# Patient Record
Sex: FEMALE | Race: BLACK OR AFRICAN AMERICAN | Employment: OTHER
[De-identification: names, ages, dates, MRNs, and addresses within clinical notes are randomized per-mention and may not be internally consistent; named-entity substitution may affect disease eponyms.]

---

## 2017-01-01 ENCOUNTER — HOME CARE VISIT (OUTPATIENT)
Dept: SCHEDULING | Facility: HOME HEALTH | Age: 82
End: 2017-01-01
Payer: MEDICARE

## 2017-01-01 ENCOUNTER — APPOINTMENT (OUTPATIENT)
Dept: GENERAL RADIOLOGY | Age: 82
DRG: 064 | End: 2017-01-01
Attending: PHYSICIAN ASSISTANT
Payer: MEDICARE

## 2017-01-01 ENCOUNTER — APPOINTMENT (OUTPATIENT)
Dept: CT IMAGING | Age: 82
DRG: 064 | End: 2017-01-01
Attending: PSYCHIATRY & NEUROLOGY
Payer: MEDICARE

## 2017-01-01 ENCOUNTER — HOSPICE ADMISSION (OUTPATIENT)
Dept: HOSPICE | Facility: HOSPICE | Age: 82
End: 2017-01-01
Payer: MEDICARE

## 2017-01-01 ENCOUNTER — APPOINTMENT (OUTPATIENT)
Dept: GENERAL RADIOLOGY | Age: 82
DRG: 064 | End: 2017-01-01
Attending: EMERGENCY MEDICINE
Payer: MEDICARE

## 2017-01-01 ENCOUNTER — OFFICE VISIT (OUTPATIENT)
Dept: FAMILY MEDICINE CLINIC | Age: 82
End: 2017-01-01

## 2017-01-01 ENCOUNTER — APPOINTMENT (OUTPATIENT)
Dept: GENERAL RADIOLOGY | Age: 82
DRG: 064 | End: 2017-01-01
Attending: HOSPITALIST
Payer: MEDICARE

## 2017-01-01 ENCOUNTER — HOME CARE VISIT (OUTPATIENT)
Dept: HOSPICE | Facility: HOSPICE | Age: 82
End: 2017-01-01
Payer: MEDICARE

## 2017-01-01 ENCOUNTER — APPOINTMENT (OUTPATIENT)
Dept: CT IMAGING | Age: 82
DRG: 064 | End: 2017-01-01
Attending: EMERGENCY MEDICINE
Payer: MEDICARE

## 2017-01-01 ENCOUNTER — HOSPITAL ENCOUNTER (OUTPATIENT)
Dept: LAB | Age: 82
Discharge: HOME | End: 2017-05-03

## 2017-01-01 ENCOUNTER — HOSPICE ADMISSION (OUTPATIENT)
Dept: HOSPICE | Facility: HOSPICE | Age: 82
End: 2017-01-01

## 2017-01-01 ENCOUNTER — HOSPITAL ENCOUNTER (INPATIENT)
Age: 82
LOS: 12 days | Discharge: HOSPICE | DRG: 064 | End: 2017-05-18
Attending: EMERGENCY MEDICINE | Admitting: HOSPITALIST
Payer: MEDICARE

## 2017-01-01 ENCOUNTER — HOSPITAL ENCOUNTER (OUTPATIENT)
Dept: LAB | Age: 82
Discharge: HOME | End: 2017-02-16
Payer: MEDICARE

## 2017-01-01 VITALS
OXYGEN SATURATION: 99 % | RESPIRATION RATE: 16 BRPM | SYSTOLIC BLOOD PRESSURE: 80 MMHG | HEART RATE: 128 BPM | DIASTOLIC BLOOD PRESSURE: 62 MMHG

## 2017-01-01 VITALS
TEMPERATURE: 97.7 F | BODY MASS INDEX: 15.5 KG/M2 | SYSTOLIC BLOOD PRESSURE: 164 MMHG | RESPIRATION RATE: 16 BRPM | WEIGHT: 90.8 LBS | HEART RATE: 76 BPM | OXYGEN SATURATION: 97 % | DIASTOLIC BLOOD PRESSURE: 96 MMHG | HEIGHT: 64 IN

## 2017-01-01 VITALS
WEIGHT: 92.4 LBS | BODY MASS INDEX: 15.77 KG/M2 | HEIGHT: 64 IN | HEART RATE: 70 BPM | TEMPERATURE: 97.3 F | RESPIRATION RATE: 18 BRPM | DIASTOLIC BLOOD PRESSURE: 76 MMHG | OXYGEN SATURATION: 98 % | SYSTOLIC BLOOD PRESSURE: 172 MMHG

## 2017-01-01 VITALS
SYSTOLIC BLOOD PRESSURE: 80 MMHG | RESPIRATION RATE: 20 BRPM | TEMPERATURE: 99 F | DIASTOLIC BLOOD PRESSURE: 69 MMHG | HEART RATE: 108 BPM | OXYGEN SATURATION: 93 %

## 2017-01-01 VITALS
BODY MASS INDEX: 15.21 KG/M2 | TEMPERATURE: 98 F | RESPIRATION RATE: 19 BRPM | OXYGEN SATURATION: 97 % | HEIGHT: 64 IN | HEART RATE: 98 BPM | DIASTOLIC BLOOD PRESSURE: 71 MMHG | SYSTOLIC BLOOD PRESSURE: 107 MMHG | WEIGHT: 89.07 LBS

## 2017-01-01 VITALS
TEMPERATURE: 96 F | RESPIRATION RATE: 14 BRPM | WEIGHT: 94 LBS | SYSTOLIC BLOOD PRESSURE: 178 MMHG | HEIGHT: 64 IN | BODY MASS INDEX: 16.05 KG/M2 | DIASTOLIC BLOOD PRESSURE: 94 MMHG | OXYGEN SATURATION: 100 % | HEART RATE: 61 BPM

## 2017-01-01 DIAGNOSIS — M88.9 PAGET'S BONE DISEASE: ICD-10-CM

## 2017-01-01 DIAGNOSIS — I16.1 HYPERTENSIVE EMERGENCY: ICD-10-CM

## 2017-01-01 DIAGNOSIS — Z71.89 ACP (ADVANCE CARE PLANNING): ICD-10-CM

## 2017-01-01 DIAGNOSIS — E03.1 CONGENITAL HYPOTHYROIDISM WITHOUT GOITER: ICD-10-CM

## 2017-01-01 DIAGNOSIS — Z93.3 COLOSTOMY IN PLACE (HCC): Primary | ICD-10-CM

## 2017-01-01 DIAGNOSIS — D50.0 IRON DEFICIENCY ANEMIA DUE TO CHRONIC BLOOD LOSS: ICD-10-CM

## 2017-01-01 DIAGNOSIS — Z85.048 HISTORY OF RECTAL CANCER: ICD-10-CM

## 2017-01-01 DIAGNOSIS — E55.9 VITAMIN D INSUFFICIENCY: ICD-10-CM

## 2017-01-01 DIAGNOSIS — E02 SUBCLINICAL IODINE-DEFICIENCY HYPOTHYROIDISM: ICD-10-CM

## 2017-01-01 DIAGNOSIS — F02.80 DEMENTIA ASSOCIATED WITH OTHER UNDERLYING DISEASE WITHOUT BEHAVIORAL DISTURBANCE (HCC): ICD-10-CM

## 2017-01-01 DIAGNOSIS — I10 ESSENTIAL HYPERTENSION: ICD-10-CM

## 2017-01-01 DIAGNOSIS — R41.82 ALTERED MENTAL STATUS, UNSPECIFIED ALTERED MENTAL STATUS TYPE: ICD-10-CM

## 2017-01-01 DIAGNOSIS — N18.30 CKD (CHRONIC KIDNEY DISEASE) STAGE 3, GFR 30-59 ML/MIN (HCC): Chronic | ICD-10-CM

## 2017-01-01 DIAGNOSIS — S72.001A HIP FRACTURE, RIGHT, CLOSED, INITIAL ENCOUNTER (HCC): ICD-10-CM

## 2017-01-01 DIAGNOSIS — I10 ESSENTIAL HYPERTENSION: Primary | ICD-10-CM

## 2017-01-01 DIAGNOSIS — Z93.3 COLOSTOMY IN PLACE (HCC): ICD-10-CM

## 2017-01-01 DIAGNOSIS — I62.9 SPONTANEOUS INTRAPARENCHYMAL INTRACRANIAL HEMORRHAGE, ACUTE (HCC): Primary | ICD-10-CM

## 2017-01-01 DIAGNOSIS — Z00.00 ROUTINE GENERAL MEDICAL EXAMINATION AT A HEALTH CARE FACILITY: Primary | ICD-10-CM

## 2017-01-01 DIAGNOSIS — R11.2 NAUSEA AND VOMITING, INTRACTABILITY OF VOMITING NOT SPECIFIED, UNSPECIFIED VOMITING TYPE: ICD-10-CM

## 2017-01-01 LAB
ABO + RH BLD: NORMAL
ALBUMIN SERPL BCP-MCNC: 2.7 G/DL (ref 3.4–5)
ALBUMIN SERPL BCP-MCNC: 3.4 G/DL (ref 3.4–5)
ALBUMIN SERPL BCP-MCNC: 3.9 G/DL (ref 3.4–5)
ALBUMIN SERPL BCP-MCNC: 4 G/DL (ref 3.4–5)
ALBUMIN SERPL-MCNC: 4.3 G/DL (ref 3.5–4.7)
ALBUMIN/GLOB SERPL: 0.7 {RATIO} (ref 0.8–1.7)
ALBUMIN/GLOB SERPL: 1 {RATIO} (ref 0.8–1.7)
ALBUMIN/GLOB SERPL: 1 {RATIO} (ref 0.8–1.7)
ALBUMIN/GLOB SERPL: 1.1 {RATIO} (ref 0.8–1.7)
ALBUMIN/GLOB SERPL: 1.5 {RATIO} (ref 1.2–2.2)
ALP SERPL-CCNC: 55 IU/L (ref 39–117)
ALP SERPL-CCNC: 61 U/L (ref 45–117)
ALP SERPL-CCNC: 64 U/L (ref 45–117)
ALP SERPL-CCNC: 68 U/L (ref 45–117)
ALP SERPL-CCNC: 70 U/L (ref 45–117)
ALT SERPL-CCNC: 12 IU/L (ref 0–32)
ALT SERPL-CCNC: 22 U/L (ref 13–56)
ALT SERPL-CCNC: 26 U/L (ref 13–56)
ALT SERPL-CCNC: 27 U/L (ref 13–56)
ALT SERPL-CCNC: 32 U/L (ref 13–56)
AMMONIA PLAS-SCNC: 19 UMOL/L (ref 11–32)
AMMONIA PLAS-SCNC: 24 UMOL/L (ref 11–32)
AMMONIA PLAS-SCNC: 32 UMOL/L (ref 11–32)
AMYLASE SERPL-CCNC: 217 U/L (ref 25–115)
AMYLASE SERPL-CCNC: 315 U/L (ref 25–115)
AMYLASE SERPL-CCNC: 342 U/L (ref 25–115)
ANION GAP BLD CALC-SCNC: 10 MMOL/L (ref 3–18)
ANION GAP BLD CALC-SCNC: 10 MMOL/L (ref 3–18)
ANION GAP BLD CALC-SCNC: 11 MMOL/L (ref 3–18)
ANION GAP BLD CALC-SCNC: 12 MMOL/L (ref 3–18)
ANION GAP BLD CALC-SCNC: 12 MMOL/L (ref 3–18)
ANION GAP BLD CALC-SCNC: 14 MMOL/L (ref 3–18)
ANION GAP BLD CALC-SCNC: 6 MMOL/L (ref 3–18)
ANION GAP BLD CALC-SCNC: 8 MMOL/L (ref 3–18)
ANION GAP BLD CALC-SCNC: 9 MMOL/L (ref 3–18)
ANION GAP BLD CALC-SCNC: 9 MMOL/L (ref 3–18)
ANTIGENS PRESENT BLD: NORMAL
ANTIGENS PRESENT RBC DONR: NORMAL
APPEARANCE UR: ABNORMAL
APPEARANCE UR: ABNORMAL
APPEARANCE UR: CLEAR
APTT PPP: 27.5 SEC (ref 23–36.4)
APTT PPP: 30.3 SEC (ref 23–36.4)
APTT PPP: 31.3 SEC (ref 23–36.4)
APTT PPP: 31.5 SEC (ref 23–36.4)
APTT PPP: 32.8 SEC (ref 23–36.4)
APTT PPP: 33.3 SEC (ref 23–36.4)
APTT PPP: 33.9 SEC (ref 23–36.4)
APTT PPP: 34.2 SEC (ref 23–36.4)
APTT PPP: 36 SEC (ref 23–36.4)
ARTERIAL PATENCY WRIST A: YES
AST SERPL W P-5'-P-CCNC: 18 U/L (ref 15–37)
AST SERPL W P-5'-P-CCNC: 19 U/L (ref 15–37)
AST SERPL W P-5'-P-CCNC: 35 U/L (ref 15–37)
AST SERPL W P-5'-P-CCNC: 38 U/L (ref 15–37)
AST SERPL-CCNC: 16 IU/L (ref 0–40)
ATRIAL RATE: 61 BPM
BACTERIA #/AREA URNS HPF: ABNORMAL /[HPF]
BACTERIA SPEC CULT: ABNORMAL
BACTERIA SPEC CULT: NORMAL
BACTERIA UR CULT: NORMAL
BACTERIA URNS QL MICRO: ABNORMAL /HPF
BACTERIA URNS QL MICRO: NEGATIVE /HPF
BASE DEFICIT BLD-SCNC: 1 MMOL/L
BASE EXCESS BLD CALC-SCNC: 1 MMOL/L
BASE EXCESS BLD CALC-SCNC: 10 MMOL/L
BASE EXCESS BLD CALC-SCNC: 2 MMOL/L
BASE EXCESS BLDV CALC-SCNC: 2 MMOL/L
BASOPHILS # BLD AUTO: 0 K/UL (ref 0–0.06)
BASOPHILS # BLD AUTO: 0 K/UL (ref 0–0.1)
BASOPHILS # BLD AUTO: 0 X10E3/UL (ref 0–0.2)
BASOPHILS # BLD: 0 % (ref 0–2)
BASOPHILS # BLD: 0 % (ref 0–3)
BASOPHILS NFR BLD AUTO: 0 %
BDY SITE: ABNORMAL
BILIRUB DIRECT SERPL-MCNC: 0.1 MG/DL (ref 0–0.2)
BILIRUB DIRECT SERPL-MCNC: 0.2 MG/DL (ref 0–0.2)
BILIRUB SERPL-MCNC: 0.4 MG/DL (ref 0.2–1)
BILIRUB SERPL-MCNC: 0.5 MG/DL (ref 0.2–1)
BILIRUB SERPL-MCNC: 0.5 MG/DL (ref 0–1.2)
BILIRUB SERPL-MCNC: 0.6 MG/DL (ref 0.2–1)
BILIRUB SERPL-MCNC: 0.9 MG/DL (ref 0.2–1)
BILIRUB UR QL STRIP: NEGATIVE
BILIRUB UR QL: NEGATIVE
BILIRUB UR QL: NEGATIVE
BLD PROD TYP BPU: NORMAL
BLOOD BANK CMNT PATIENT-IMP: NORMAL
BLOOD GROUP ANTIBODIES SERPL: NORMAL
BODY TEMPERATURE: 97.7
BODY TEMPERATURE: 98.7
BPU ID: NORMAL
BUN SERPL-MCNC: 11 MG/DL (ref 7–18)
BUN SERPL-MCNC: 11 MG/DL (ref 7–18)
BUN SERPL-MCNC: 12 MG/DL (ref 7–18)
BUN SERPL-MCNC: 12 MG/DL (ref 8–27)
BUN SERPL-MCNC: 13 MG/DL (ref 7–18)
BUN SERPL-MCNC: 14 MG/DL (ref 7–18)
BUN SERPL-MCNC: 16 MG/DL (ref 7–18)
BUN SERPL-MCNC: 17 MG/DL (ref 7–18)
BUN SERPL-MCNC: 18 MG/DL (ref 7–18)
BUN SERPL-MCNC: 18 MG/DL (ref 7–18)
BUN SERPL-MCNC: 19 MG/DL (ref 7–18)
BUN/CREAT SERPL: 10 (ref 12–20)
BUN/CREAT SERPL: 11 (ref 12–20)
BUN/CREAT SERPL: 11 (ref 12–20)
BUN/CREAT SERPL: 11 (ref 12–28)
BUN/CREAT SERPL: 12 (ref 12–20)
BUN/CREAT SERPL: 13 (ref 12–20)
BUN/CREAT SERPL: 14 (ref 12–20)
BUN/CREAT SERPL: 14 (ref 12–20)
CA-I SERPL-SCNC: 0.92 MMOL/L (ref 1.12–1.32)
CA-I SERPL-SCNC: 1.17 MMOL/L (ref 1.12–1.32)
CA-I SERPL-SCNC: 1.19 MMOL/L (ref 1.12–1.32)
CA-I SERPL-SCNC: 1.19 MMOL/L (ref 1.12–1.32)
CA-I SERPL-SCNC: 1.23 MMOL/L (ref 1.12–1.32)
CA-I SERPL-SCNC: 1.25 MMOL/L (ref 1.12–1.32)
CA-I SERPL-SCNC: 1.26 MMOL/L (ref 1.12–1.32)
CA-I SERPL-SCNC: 1.3 MMOL/L (ref 1.12–1.32)
CA-I SERPL-SCNC: 1.34 MMOL/L (ref 1.12–1.32)
CALCIUM SERPL-MCNC: 10.1 MG/DL (ref 8.7–10.3)
CALCIUM SERPL-MCNC: 9.3 MG/DL (ref 8.5–10.1)
CALCIUM SERPL-MCNC: 9.4 MG/DL (ref 8.5–10.1)
CALCIUM SERPL-MCNC: 9.5 MG/DL (ref 8.5–10.1)
CALCIUM SERPL-MCNC: 9.7 MG/DL (ref 8.5–10.1)
CALCIUM SERPL-MCNC: 9.7 MG/DL (ref 8.5–10.1)
CALCIUM SERPL-MCNC: 9.9 MG/DL (ref 8.5–10.1)
CALCIUM SERPL-MCNC: 9.9 MG/DL (ref 8.5–10.1)
CALCULATED P AXIS, ECG09: 63 DEGREES
CALCULATED R AXIS, ECG10: 14 DEGREES
CALCULATED T AXIS, ECG11: -3 DEGREES
CASTS URNS QL MICRO: ABNORMAL /LPF
CHLORIDE SERPL-SCNC: 102 MMOL/L (ref 96–106)
CHLORIDE SERPL-SCNC: 104 MMOL/L (ref 100–108)
CHLORIDE SERPL-SCNC: 107 MMOL/L (ref 100–108)
CHLORIDE SERPL-SCNC: 109 MMOL/L (ref 100–108)
CHLORIDE SERPL-SCNC: 116 MMOL/L (ref 100–108)
CHLORIDE SERPL-SCNC: 117 MMOL/L (ref 100–108)
CHLORIDE SERPL-SCNC: 117 MMOL/L (ref 100–108)
CHLORIDE SERPL-SCNC: 118 MMOL/L (ref 100–108)
CHLORIDE SERPL-SCNC: 122 MMOL/L (ref 100–108)
CHLORIDE SERPL-SCNC: 124 MMOL/L (ref 100–108)
CHLORIDE SERPL-SCNC: 125 MMOL/L (ref 100–108)
CHOLEST SERPL-MCNC: 187 MG/DL
CHOLEST SERPL-MCNC: 200 MG/DL (ref 100–199)
CHOLEST SERPL-MCNC: 203 MG/DL
CK MB CFR SERPL CALC: 0.6 % (ref 0–4)
CK MB CFR SERPL CALC: 1.2 % (ref 0–4)
CK MB CFR SERPL CALC: 1.6 % (ref 0–4)
CK MB SERPL-MCNC: 1.6 NG/ML (ref 5–25)
CK MB SERPL-MCNC: 2.9 NG/ML (ref 5–25)
CK MB SERPL-MCNC: 3.7 NG/ML (ref 5–25)
CK SERPL-CCNC: 101 U/L (ref 26–192)
CK SERPL-CCNC: 320 U/L (ref 26–192)
CK SERPL-CCNC: 489 U/L (ref 26–192)
CO2 SERPL-SCNC: 22 MMOL/L (ref 21–32)
CO2 SERPL-SCNC: 23 MMOL/L (ref 21–32)
CO2 SERPL-SCNC: 24 MMOL/L (ref 21–32)
CO2 SERPL-SCNC: 24 MMOL/L (ref 21–32)
CO2 SERPL-SCNC: 25 MMOL/L (ref 21–32)
CO2 SERPL-SCNC: 28 MMOL/L (ref 18–29)
CO2 SERPL-SCNC: 30 MMOL/L (ref 21–32)
CO2 SERPL-SCNC: 31 MMOL/L (ref 21–32)
COLOR UR: YELLOW
CORTIS SERPL-MCNC: 30.4 UG/DL (ref 3.09–22.4)
CREAT SERPL-MCNC: 1.03 MG/DL (ref 0.6–1.3)
CREAT SERPL-MCNC: 1.03 MG/DL (ref 0.6–1.3)
CREAT SERPL-MCNC: 1.11 MG/DL (ref 0.57–1)
CREAT SERPL-MCNC: 1.15 MG/DL (ref 0.6–1.3)
CREAT SERPL-MCNC: 1.19 MG/DL (ref 0.6–1.3)
CREAT SERPL-MCNC: 1.22 MG/DL (ref 0.6–1.3)
CREAT SERPL-MCNC: 1.22 MG/DL (ref 0.6–1.3)
CREAT SERPL-MCNC: 1.28 MG/DL (ref 0.6–1.3)
CREAT SERPL-MCNC: 1.4 MG/DL (ref 0.6–1.3)
CREAT SERPL-MCNC: 1.43 MG/DL (ref 0.6–1.3)
CREAT SERPL-MCNC: 1.48 MG/DL (ref 0.6–1.3)
CROSSMATCH RESULT,%XM: NORMAL
CRP SERPL HS-MCNC: 0.45 MG/L (ref 0–3)
DIAGNOSIS, 93000: NORMAL
DIFFERENTIAL METHOD BLD: ABNORMAL
EOSINOPHIL # BLD AUTO: 0.1 X10E3/UL (ref 0–0.4)
EOSINOPHIL # BLD: 0 K/UL (ref 0–0.4)
EOSINOPHIL # BLD: 0.1 K/UL (ref 0–0.4)
EOSINOPHIL # BLD: 0.1 K/UL (ref 0–0.4)
EOSINOPHIL NFR BLD AUTO: 1 %
EOSINOPHIL NFR BLD: 0 % (ref 0–5)
EOSINOPHIL NFR BLD: 1 % (ref 0–5)
EOSINOPHIL NFR BLD: 2 % (ref 0–5)
EPI CELLS #/AREA URNS HPF: ABNORMAL /HPF
EPITH CASTS URNS QL MICRO: ABNORMAL /LPF (ref 0–5)
EPITH CASTS URNS QL MICRO: NORMAL /LPF (ref 0–5)
ERYTHROCYTE [DISTWIDTH] IN BLOOD BY AUTOMATED COUNT: 14.2 % (ref 11.6–14.5)
ERYTHROCYTE [DISTWIDTH] IN BLOOD BY AUTOMATED COUNT: 14.3 % (ref 11.6–14.5)
ERYTHROCYTE [DISTWIDTH] IN BLOOD BY AUTOMATED COUNT: 14.6 % (ref 11.6–14.5)
ERYTHROCYTE [DISTWIDTH] IN BLOOD BY AUTOMATED COUNT: 14.8 % (ref 11.6–14.5)
ERYTHROCYTE [DISTWIDTH] IN BLOOD BY AUTOMATED COUNT: 14.8 % (ref 11.6–14.5)
ERYTHROCYTE [DISTWIDTH] IN BLOOD BY AUTOMATED COUNT: 14.9 % (ref 11.6–14.5)
ERYTHROCYTE [DISTWIDTH] IN BLOOD BY AUTOMATED COUNT: 15.3 % (ref 11.6–14.5)
ERYTHROCYTE [DISTWIDTH] IN BLOOD BY AUTOMATED COUNT: 15.3 % (ref 12.3–15.4)
ERYTHROCYTE [SEDIMENTATION RATE] IN BLOOD BY WESTERGREN METHOD: 25 MM/HR (ref 0–40)
ERYTHROCYTE [SEDIMENTATION RATE] IN BLOOD: 34 MM/HR (ref 0–30)
EST. AVERAGE GLUCOSE BLD GHB EST-MCNC: 128 MG/DL
FERRITIN SERPL-MCNC: 116 NG/ML (ref 8–388)
FERRITIN SERPL-MCNC: 140 NG/ML (ref 15–150)
FOLATE SERPL-MCNC: >20 NG/ML
GAS FLOW.O2 O2 DELIVERY SYS: ABNORMAL L/MIN
GAS FLOW.O2 SETTING OXYMISER: 12 BPM
GLOBULIN SER CALC-MCNC: 2.9 G/DL (ref 1.5–4.5)
GLOBULIN SER CALC-MCNC: 3.4 G/DL (ref 2–4)
GLOBULIN SER CALC-MCNC: 3.5 G/DL (ref 2–4)
GLOBULIN SER CALC-MCNC: 3.8 G/DL (ref 2–4)
GLOBULIN SER CALC-MCNC: 4.1 G/DL (ref 2–4)
GLUCOSE BLD STRIP.AUTO-MCNC: 121 MG/DL (ref 70–110)
GLUCOSE BLD STRIP.AUTO-MCNC: 136 MG/DL (ref 70–110)
GLUCOSE BLD STRIP.AUTO-MCNC: 143 MG/DL (ref 70–110)
GLUCOSE BLD STRIP.AUTO-MCNC: 145 MG/DL (ref 70–110)
GLUCOSE BLD STRIP.AUTO-MCNC: 145 MG/DL (ref 70–110)
GLUCOSE BLD STRIP.AUTO-MCNC: 150 MG/DL (ref 70–110)
GLUCOSE BLD STRIP.AUTO-MCNC: 150 MG/DL (ref 70–110)
GLUCOSE BLD STRIP.AUTO-MCNC: 153 MG/DL (ref 70–110)
GLUCOSE BLD STRIP.AUTO-MCNC: 154 MG/DL (ref 70–110)
GLUCOSE BLD STRIP.AUTO-MCNC: 158 MG/DL (ref 70–110)
GLUCOSE BLD STRIP.AUTO-MCNC: 160 MG/DL (ref 70–110)
GLUCOSE BLD STRIP.AUTO-MCNC: 165 MG/DL (ref 70–110)
GLUCOSE BLD STRIP.AUTO-MCNC: 168 MG/DL (ref 70–110)
GLUCOSE BLD STRIP.AUTO-MCNC: 169 MG/DL (ref 70–110)
GLUCOSE BLD STRIP.AUTO-MCNC: 169 MG/DL (ref 70–110)
GLUCOSE BLD STRIP.AUTO-MCNC: 174 MG/DL (ref 70–110)
GLUCOSE BLD STRIP.AUTO-MCNC: 177 MG/DL (ref 70–110)
GLUCOSE BLD STRIP.AUTO-MCNC: 180 MG/DL (ref 70–110)
GLUCOSE BLD STRIP.AUTO-MCNC: 185 MG/DL (ref 70–110)
GLUCOSE BLD STRIP.AUTO-MCNC: 193 MG/DL (ref 70–110)
GLUCOSE BLD STRIP.AUTO-MCNC: 198 MG/DL (ref 70–110)
GLUCOSE BLD STRIP.AUTO-MCNC: 200 MG/DL (ref 70–110)
GLUCOSE BLD STRIP.AUTO-MCNC: 225 MG/DL (ref 70–110)
GLUCOSE BLD STRIP.AUTO-MCNC: 228 MG/DL (ref 70–110)
GLUCOSE BLD STRIP.AUTO-MCNC: 237 MG/DL (ref 70–110)
GLUCOSE BLD STRIP.AUTO-MCNC: 241 MG/DL (ref 70–110)
GLUCOSE BLD STRIP.AUTO-MCNC: 255 MG/DL (ref 70–110)
GLUCOSE BLD STRIP.AUTO-MCNC: 259 MG/DL (ref 70–110)
GLUCOSE BLD STRIP.AUTO-MCNC: 269 MG/DL (ref 70–110)
GLUCOSE BLD STRIP.AUTO-MCNC: 299 MG/DL (ref 70–110)
GLUCOSE BLD STRIP.AUTO-MCNC: 345 MG/DL (ref 70–110)
GLUCOSE BLD STRIP.AUTO-MCNC: 93 MG/DL (ref 70–110)
GLUCOSE SERPL-MCNC: 114 MG/DL (ref 74–99)
GLUCOSE SERPL-MCNC: 127 MG/DL (ref 74–99)
GLUCOSE SERPL-MCNC: 142 MG/DL (ref 74–99)
GLUCOSE SERPL-MCNC: 144 MG/DL (ref 74–99)
GLUCOSE SERPL-MCNC: 147 MG/DL (ref 74–99)
GLUCOSE SERPL-MCNC: 152 MG/DL (ref 74–99)
GLUCOSE SERPL-MCNC: 181 MG/DL (ref 74–99)
GLUCOSE SERPL-MCNC: 220 MG/DL (ref 74–99)
GLUCOSE SERPL-MCNC: 83 MG/DL (ref 74–99)
GLUCOSE SERPL-MCNC: 84 MG/DL (ref 65–99)
GLUCOSE SERPL-MCNC: 89 MG/DL (ref 74–99)
GLUCOSE UR QL: NEGATIVE
GLUCOSE UR STRIP.AUTO-MCNC: 250 MG/DL
GLUCOSE UR STRIP.AUTO-MCNC: NEGATIVE MG/DL
GRAM STN SPEC: ABNORMAL
HBA1C MFR BLD: 6.1 % (ref 4.2–5.6)
HCO3 BLD-SCNC: 22.4 MMOL/L (ref 22–26)
HCO3 BLD-SCNC: 24.5 MMOL/L (ref 22–26)
HCO3 BLD-SCNC: 25.5 MMOL/L (ref 22–26)
HCO3 BLD-SCNC: 33.7 MMOL/L (ref 22–26)
HCO3 BLDV-SCNC: 25.7 MMOL/L (ref 23–28)
HCT VFR BLD AUTO: 30 % (ref 35–45)
HCT VFR BLD AUTO: 30.4 % (ref 35–45)
HCT VFR BLD AUTO: 31.2 % (ref 35–45)
HCT VFR BLD AUTO: 31.2 % (ref 35–45)
HCT VFR BLD AUTO: 31.5 % (ref 35–45)
HCT VFR BLD AUTO: 32.7 % (ref 35–45)
HCT VFR BLD AUTO: 33.1 % (ref 35–45)
HCT VFR BLD AUTO: 35.6 % (ref 35–45)
HCT VFR BLD AUTO: 36.6 % (ref 35–45)
HCT VFR BLD AUTO: 36.8 % (ref 34–46.6)
HDLC SERPL-MCNC: 75 MG/DL
HDLC SERPL-MCNC: 75 MG/DL (ref 40–60)
HDLC SERPL-MCNC: 79 MG/DL (ref 40–60)
HDLC SERPL: 2.4 {RATIO} (ref 0–5)
HDLC SERPL: 2.7 {RATIO} (ref 0–5)
HEMOCCULT STL QL: NEGATIVE
HEMOCCULT STL QL: NEGATIVE
HGB BLD-MCNC: 10.2 G/DL (ref 12–16)
HGB BLD-MCNC: 10.3 G/DL (ref 12–16)
HGB BLD-MCNC: 10.4 G/DL (ref 12–16)
HGB BLD-MCNC: 10.4 G/DL (ref 12–16)
HGB BLD-MCNC: 10.5 G/DL (ref 12–16)
HGB BLD-MCNC: 10.9 G/DL (ref 12–16)
HGB BLD-MCNC: 11.2 G/DL (ref 12–16)
HGB BLD-MCNC: 11.9 G/DL (ref 12–16)
HGB BLD-MCNC: 12.1 G/DL (ref 11.1–15.9)
HGB BLD-MCNC: 12.4 G/DL (ref 12–16)
HGB UR QL STRIP: ABNORMAL
HGB UR QL STRIP: NEGATIVE
HGB UR QL STRIP: NEGATIVE
IMM GRANULOCYTES # BLD: 0 X10E3/UL (ref 0–0.1)
IMM GRANULOCYTES NFR BLD: 0 %
INR BLD: 1.3 (ref 0.9–1.2)
INR PPP: 1.1 (ref 0.8–1.2)
INR PPP: 1.2 (ref 0.8–1.2)
INR PPP: 1.3 (ref 0.8–1.2)
INR PPP: 1.4 (ref 0.8–1.2)
INSPIRATION.DURATION SETTING TIME VENT: 0.9 SEC
INTERPRETATION, 910389: NORMAL
INTERPRETATION: NORMAL
KETONES UR QL STRIP.AUTO: NEGATIVE MG/DL
KETONES UR QL STRIP.AUTO: NEGATIVE MG/DL
KETONES UR QL STRIP: NEGATIVE
LACTATE SERPL-SCNC: 1.5 MMOL/L (ref 0.4–2)
LACTATE SERPL-SCNC: 1.6 MMOL/L (ref 0.4–2)
LACTATE SERPL-SCNC: 1.9 MMOL/L (ref 0.4–2)
LACTATE SERPL-SCNC: 1.9 MMOL/L (ref 0.4–2)
LACTATE SERPL-SCNC: 2 MMOL/L (ref 0.4–2)
LACTATE SERPL-SCNC: 2.3 MMOL/L (ref 0.4–2)
LDLC SERPL CALC-MCNC: 108 MG/DL (ref 0–99)
LDLC SERPL CALC-MCNC: 74.4 MG/DL (ref 0–100)
LDLC SERPL CALC-MCNC: 99.4 MG/DL (ref 0–100)
LEUKOCYTE ESTERASE UR QL STRIP.AUTO: ABNORMAL
LEUKOCYTE ESTERASE UR QL STRIP.AUTO: ABNORMAL
LEUKOCYTE ESTERASE UR QL STRIP: ABNORMAL
LIPASE SERPL-CCNC: 202 U/L (ref 73–393)
LIPASE SERPL-CCNC: 387 U/L (ref 73–393)
LIPASE SERPL-CCNC: 668 U/L (ref 73–393)
LIPID PROFILE,FLP: ABNORMAL
LIPID PROFILE,FLP: ABNORMAL
LYMPHOCYTES # BLD AUTO: 1.1 X10E3/UL (ref 0.7–3.1)
LYMPHOCYTES # BLD AUTO: 15 % (ref 21–52)
LYMPHOCYTES # BLD AUTO: 3 % (ref 21–52)
LYMPHOCYTES # BLD AUTO: 31 % (ref 21–52)
LYMPHOCYTES # BLD AUTO: 4 % (ref 21–52)
LYMPHOCYTES # BLD AUTO: 5 % (ref 20–51)
LYMPHOCYTES # BLD AUTO: 5 % (ref 21–52)
LYMPHOCYTES # BLD AUTO: 5 % (ref 21–52)
LYMPHOCYTES # BLD AUTO: 6 % (ref 21–52)
LYMPHOCYTES # BLD AUTO: 8 % (ref 21–52)
LYMPHOCYTES # BLD: 0.6 K/UL (ref 0.9–3.6)
LYMPHOCYTES # BLD: 0.6 K/UL (ref 0.9–3.6)
LYMPHOCYTES # BLD: 0.7 K/UL (ref 0.9–3.6)
LYMPHOCYTES # BLD: 0.8 K/UL (ref 0.9–3.6)
LYMPHOCYTES # BLD: 0.9 K/UL (ref 0.9–3.6)
LYMPHOCYTES # BLD: 1 K/UL (ref 0.9–3.6)
LYMPHOCYTES # BLD: 1.1 K/UL (ref 0.8–3.5)
LYMPHOCYTES # BLD: 1.3 K/UL (ref 0.9–3.6)
LYMPHOCYTES # BLD: 3 K/UL (ref 0.9–3.6)
LYMPHOCYTES NFR BLD AUTO: 16 %
MAGNESIUM SERPL-MCNC: 1.8 MG/DL (ref 1.6–2.6)
MAGNESIUM SERPL-MCNC: 1.9 MG/DL (ref 1.6–2.6)
MAGNESIUM SERPL-MCNC: 2.1 MG/DL (ref 1.6–2.6)
MAGNESIUM SERPL-MCNC: 2.1 MG/DL (ref 1.6–2.6)
MAGNESIUM SERPL-MCNC: 2.2 MG/DL (ref 1.6–2.6)
MAGNESIUM SERPL-MCNC: 2.2 MG/DL (ref 1.6–2.6)
MAGNESIUM SERPL-MCNC: 2.3 MG/DL (ref 1.6–2.6)
MAGNESIUM SERPL-MCNC: 2.4 MG/DL (ref 1.6–2.6)
MAGNESIUM SERPL-MCNC: 2.4 MG/DL (ref 1.6–2.6)
MCH RBC QN AUTO: 29.6 PG (ref 24–34)
MCH RBC QN AUTO: 29.9 PG (ref 24–34)
MCH RBC QN AUTO: 30 PG (ref 24–34)
MCH RBC QN AUTO: 30.1 PG (ref 24–34)
MCH RBC QN AUTO: 30.1 PG (ref 24–34)
MCH RBC QN AUTO: 30.3 PG (ref 24–34)
MCH RBC QN AUTO: 30.4 PG (ref 24–34)
MCH RBC QN AUTO: 30.4 PG (ref 26.6–33)
MCH RBC QN AUTO: 30.6 PG (ref 24–34)
MCH RBC QN AUTO: 30.7 PG (ref 24–34)
MCHC RBC AUTO-ENTMCNC: 32.9 G/DL (ref 31.5–35.7)
MCHC RBC AUTO-ENTMCNC: 33.3 G/DL (ref 31–37)
MCHC RBC AUTO-ENTMCNC: 33.4 G/DL (ref 31–37)
MCHC RBC AUTO-ENTMCNC: 33.8 G/DL (ref 31–37)
MCHC RBC AUTO-ENTMCNC: 33.9 G/DL (ref 31–37)
MCHC RBC AUTO-ENTMCNC: 33.9 G/DL (ref 31–37)
MCHC RBC AUTO-ENTMCNC: 34 G/DL (ref 31–37)
MCV RBC AUTO: 88.7 FL (ref 74–97)
MCV RBC AUTO: 88.9 FL (ref 74–97)
MCV RBC AUTO: 89 FL (ref 74–97)
MCV RBC AUTO: 89.8 FL (ref 74–97)
MCV RBC AUTO: 89.9 FL (ref 74–97)
MCV RBC AUTO: 89.9 FL (ref 74–97)
MCV RBC AUTO: 90.4 FL (ref 74–97)
MCV RBC AUTO: 90.4 FL (ref 74–97)
MCV RBC AUTO: 91.8 FL (ref 74–97)
MCV RBC AUTO: 93 FL (ref 79–97)
MICRO URNS: ABNORMAL
MONOCYTES # BLD AUTO: 0.5 X10E3/UL (ref 0.1–0.9)
MONOCYTES # BLD: 0.4 K/UL (ref 0.05–1.2)
MONOCYTES # BLD: 0.6 K/UL (ref 0.05–1.2)
MONOCYTES # BLD: 1.1 K/UL (ref 0.05–1.2)
MONOCYTES # BLD: 1.3 K/UL (ref 0.05–1.2)
MONOCYTES # BLD: 1.3 K/UL (ref 0.05–1.2)
MONOCYTES # BLD: 1.3 K/UL (ref 0–1)
MONOCYTES # BLD: 1.4 K/UL (ref 0.05–1.2)
MONOCYTES # BLD: 1.6 K/UL (ref 0.05–1.2)
MONOCYTES # BLD: 1.8 K/UL (ref 0.05–1.2)
MONOCYTES NFR BLD AUTO: 12 % (ref 3–10)
MONOCYTES NFR BLD AUTO: 6 % (ref 2–9)
MONOCYTES NFR BLD AUTO: 6 % (ref 3–10)
MONOCYTES NFR BLD AUTO: 6 % (ref 3–10)
MONOCYTES NFR BLD AUTO: 7 % (ref 3–10)
MONOCYTES NFR BLD AUTO: 8 %
MONOCYTES NFR BLD AUTO: 8 % (ref 3–10)
MONOCYTES NFR BLD AUTO: 8 % (ref 3–10)
MONOCYTES NFR BLD AUTO: 9 % (ref 3–10)
MONOCYTES NFR BLD AUTO: 9 % (ref 3–10)
MUCOUS THREADS URNS QL MICRO: PRESENT
NEUTROPHILS # BLD AUTO: 5.2 X10E3/UL (ref 1.4–7)
NEUTROPHILS NFR BLD AUTO: 75 %
NEUTS SEG # BLD: 12.4 K/UL (ref 1.8–8)
NEUTS SEG # BLD: 12.7 K/UL (ref 1.8–8)
NEUTS SEG # BLD: 12.7 K/UL (ref 1.8–8)
NEUTS SEG # BLD: 14.5 K/UL (ref 1.8–8)
NEUTS SEG # BLD: 14.7 K/UL (ref 1.8–8)
NEUTS SEG # BLD: 16.5 K/UL (ref 1.8–8)
NEUTS SEG # BLD: 19.8 K/UL (ref 1.8–8)
NEUTS SEG # BLD: 5.2 K/UL (ref 1.8–8)
NEUTS SEG # BLD: 5.9 K/UL (ref 1.8–8)
NEUTS SEG NFR BLD AUTO: 62 % (ref 40–73)
NEUTS SEG NFR BLD AUTO: 77 % (ref 40–73)
NEUTS SEG NFR BLD AUTO: 82 % (ref 40–73)
NEUTS SEG NFR BLD AUTO: 84 % (ref 40–73)
NEUTS SEG NFR BLD AUTO: 86 % (ref 40–73)
NEUTS SEG NFR BLD AUTO: 86 % (ref 40–73)
NEUTS SEG NFR BLD AUTO: 89 % (ref 40–73)
NEUTS SEG NFR BLD AUTO: 89 % (ref 40–73)
NEUTS SEG NFR BLD AUTO: 89 % (ref 42–75)
NITRITE UR QL STRIP.AUTO: NEGATIVE
NITRITE UR QL STRIP.AUTO: NEGATIVE
NITRITE UR QL STRIP: NEGATIVE
O2/TOTAL GAS SETTING VFR VENT: 0.3 %
O2/TOTAL GAS SETTING VFR VENT: 100 %
O2/TOTAL GAS SETTING VFR VENT: 30 %
P-R INTERVAL, ECG05: 144 MS
PCO2 BLD: 30.9 MMHG (ref 35–45)
PCO2 BLD: 31.3 MMHG (ref 35–45)
PCO2 BLD: 34.6 MMHG (ref 35–45)
PCO2 BLD: 45.2 MMHG (ref 35–45)
PCO2 BLDV: 36.7 MMHG (ref 41–51)
PDF IMAGE, 910387: NORMAL
PEEP RESPIRATORY: 5 CMH2O
PH BLD: 7.47 [PH] (ref 7.35–7.45)
PH BLD: 7.47 [PH] (ref 7.35–7.45)
PH BLD: 7.48 [PH] (ref 7.35–7.45)
PH BLD: 7.5 [PH] (ref 7.35–7.45)
PH BLDV: 7.45 [PH] (ref 7.32–7.42)
PH UR STRIP: 5 [PH] (ref 5–8)
PH UR STRIP: 6 [PH] (ref 5–8)
PH UR STRIP: 7 [PH] (ref 5–7.5)
PHOSPHATE SERPL-MCNC: 1.5 MG/DL (ref 2.5–4.9)
PHOSPHATE SERPL-MCNC: 2 MG/DL (ref 2.5–4.9)
PHOSPHATE SERPL-MCNC: 2.4 MG/DL (ref 2.5–4.9)
PHOSPHATE SERPL-MCNC: 2.5 MG/DL (ref 2.5–4.9)
PHOSPHATE SERPL-MCNC: 2.7 MG/DL (ref 2.5–4.9)
PHOSPHATE SERPL-MCNC: 2.8 MG/DL (ref 2.5–4.9)
PHOSPHATE SERPL-MCNC: 2.9 MG/DL (ref 2.5–4.9)
PHOSPHATE SERPL-MCNC: 2.9 MG/DL (ref 2.5–4.9)
PHOSPHATE SERPL-MCNC: 3 MG/DL (ref 2.5–4.9)
PHOSPHATE SERPL-MCNC: 3 MG/DL (ref 2.5–4.9)
PHOSPHATE SERPL-MCNC: 3.4 MG/DL (ref 2.5–4.9)
PIP ISTAT,IPIP: 12
PIP ISTAT,IPIP: 13
PIP ISTAT,IPIP: 17
PIP ISTAT,IPIP: 22
PLATELET # BLD AUTO: 153 K/UL (ref 135–420)
PLATELET # BLD AUTO: 162 K/UL (ref 135–420)
PLATELET # BLD AUTO: 176 K/UL (ref 135–420)
PLATELET # BLD AUTO: 180 K/UL (ref 135–420)
PLATELET # BLD AUTO: 186 K/UL (ref 135–420)
PLATELET # BLD AUTO: 190 K/UL (ref 135–420)
PLATELET # BLD AUTO: 192 K/UL (ref 135–420)
PLATELET # BLD AUTO: 199 X10E3/UL (ref 150–379)
PLATELET # BLD AUTO: 201 K/UL (ref 135–420)
PLATELET # BLD AUTO: 230 K/UL (ref 135–420)
PMV BLD AUTO: 10.2 FL (ref 9.2–11.8)
PMV BLD AUTO: 10.7 FL (ref 9.2–11.8)
PMV BLD AUTO: 10.8 FL (ref 9.2–11.8)
PMV BLD AUTO: 10.9 FL (ref 9.2–11.8)
PMV BLD AUTO: 10.9 FL (ref 9.2–11.8)
PMV BLD AUTO: 11.1 FL (ref 9.2–11.8)
PMV BLD AUTO: 11.3 FL (ref 9.2–11.8)
PO2 BLD: 147 MMHG (ref 80–100)
PO2 BLD: 149 MMHG (ref 80–100)
PO2 BLD: 564 MMHG (ref 80–100)
PO2 BLD: 83 MMHG (ref 80–100)
PO2 BLDV: 38 MMHG (ref 25–40)
POTASSIUM SERPL-SCNC: 3.5 MMOL/L (ref 3.5–5.5)
POTASSIUM SERPL-SCNC: 3.6 MMOL/L (ref 3.5–5.5)
POTASSIUM SERPL-SCNC: 3.7 MMOL/L (ref 3.5–5.5)
POTASSIUM SERPL-SCNC: 3.9 MMOL/L (ref 3.5–5.5)
POTASSIUM SERPL-SCNC: 4 MMOL/L (ref 3.5–5.2)
POTASSIUM SERPL-SCNC: 4 MMOL/L (ref 3.5–5.5)
POTASSIUM SERPL-SCNC: 4.1 MMOL/L (ref 3.5–5.5)
POTASSIUM SERPL-SCNC: 4.2 MMOL/L (ref 3.5–5.5)
POTASSIUM SERPL-SCNC: 4.3 MMOL/L (ref 3.5–5.5)
POTASSIUM SERPL-SCNC: 4.4 MMOL/L (ref 3.5–5.5)
POTASSIUM SERPL-SCNC: 4.8 MMOL/L (ref 3.5–5.5)
PRESSURE SUPPORT SETTING VENT: 7 CMH2O
PROT SERPL-MCNC: 6.5 G/DL (ref 6.4–8.2)
PROT SERPL-MCNC: 6.9 G/DL (ref 6.4–8.2)
PROT SERPL-MCNC: 7.2 G/DL (ref 6–8.5)
PROT SERPL-MCNC: 7.3 G/DL (ref 6.4–8.2)
PROT SERPL-MCNC: 8.1 G/DL (ref 6.4–8.2)
PROT UR QL STRIP: ABNORMAL
PROT UR STRIP-MCNC: 30 MG/DL
PROT UR STRIP-MCNC: NEGATIVE MG/DL
PROTHROMBIN TIME: 13.5 SEC (ref 11.5–15.2)
PROTHROMBIN TIME: 14.4 SEC (ref 11.5–15.2)
PROTHROMBIN TIME: 14.7 SEC (ref 11.5–15.2)
PROTHROMBIN TIME: 14.8 SEC (ref 11.5–15.2)
PROTHROMBIN TIME: 15.1 SEC (ref 11.5–15.2)
PROTHROMBIN TIME: 15.2 SEC (ref 11.5–15.2)
PROTHROMBIN TIME: 15.6 SEC (ref 11.5–15.2)
PROTHROMBIN TIME: 15.7 SEC (ref 11.5–15.2)
PROTHROMBIN TIME: 16.4 SEC (ref 11.5–15.2)
Q-T INTERVAL, ECG07: 360 MS
QRS DURATION, ECG06: 66 MS
QTC CALCULATION (BEZET), ECG08: 362 MS
RBC # BLD AUTO: 3.37 M/UL (ref 4.2–5.3)
RBC # BLD AUTO: 3.42 M/UL (ref 4.2–5.3)
RBC # BLD AUTO: 3.45 M/UL (ref 4.2–5.3)
RBC # BLD AUTO: 3.47 M/UL (ref 4.2–5.3)
RBC # BLD AUTO: 3.55 M/UL (ref 4.2–5.3)
RBC # BLD AUTO: 3.64 M/UL (ref 4.2–5.3)
RBC # BLD AUTO: 3.68 M/UL (ref 4.2–5.3)
RBC # BLD AUTO: 3.88 M/UL (ref 4.2–5.3)
RBC # BLD AUTO: 3.98 X10E6/UL (ref 3.77–5.28)
RBC # BLD AUTO: 4.05 M/UL (ref 4.2–5.3)
RBC #/AREA URNS HPF: ABNORMAL /HPF
RBC #/AREA URNS HPF: ABNORMAL /HPF (ref 0–5)
RBC #/AREA URNS HPF: NORMAL /HPF (ref 0–5)
RBC MORPH BLD: ABNORMAL
RPR SER QL: NON REACTIVE
SAO2 % BLD: 100 % (ref 92–97)
SAO2 % BLD: 97 % (ref 92–97)
SAO2 % BLD: 99 % (ref 92–97)
SAO2 % BLD: 99 % (ref 92–97)
SAO2 % BLDV: 75 % (ref 65–88)
SERVICE CMNT-IMP: ABNORMAL
SERVICE CMNT-IMP: NORMAL
SODIUM SERPL-SCNC: 143 MMOL/L (ref 136–145)
SODIUM SERPL-SCNC: 144 MMOL/L (ref 134–144)
SODIUM SERPL-SCNC: 144 MMOL/L (ref 136–145)
SODIUM SERPL-SCNC: 146 MMOL/L (ref 136–145)
SODIUM SERPL-SCNC: 151 MMOL/L (ref 136–145)
SODIUM SERPL-SCNC: 151 MMOL/L (ref 136–145)
SODIUM SERPL-SCNC: 152 MMOL/L (ref 136–145)
SODIUM SERPL-SCNC: 153 MMOL/L (ref 136–145)
SODIUM SERPL-SCNC: 155 MMOL/L (ref 136–145)
SODIUM SERPL-SCNC: 156 MMOL/L (ref 136–145)
SODIUM SERPL-SCNC: 157 MMOL/L (ref 136–145)
SP GR UR REFRACTOMETRY: 1.01 (ref 1–1.03)
SP GR UR REFRACTOMETRY: 1.01 (ref 1–1.03)
SP GR UR: 1.01 (ref 1–1.03)
SPECIMEN EXP DATE BLD: NORMAL
SPECIMEN TYPE: ABNORMAL
STATUS OF UNIT,%ST: NORMAL
T4 FREE SERPL-MCNC: 1.28 NG/DL (ref 0.82–1.77)
T4 FREE SERPL-MCNC: 1.4 NG/DL (ref 0.7–1.5)
TOTAL RESP. RATE, ITRR: 12
TOTAL RESP. RATE, ITRR: 15
TOTAL RESP. RATE, ITRR: 18
TRIGL SERPL-MCNC: 143 MG/DL (ref ?–150)
TRIGL SERPL-MCNC: 168 MG/DL (ref ?–150)
TRIGL SERPL-MCNC: 84 MG/DL (ref 0–149)
TROPONIN I SERPL-MCNC: 0.02 NG/ML (ref 0–0.04)
TROPONIN I SERPL-MCNC: 0.05 NG/ML (ref 0–0.04)
TROPONIN I SERPL-MCNC: <0.02 NG/ML (ref 0–0.04)
TSH SERPL DL<=0.005 MIU/L-ACNC: 0.89 UIU/ML (ref 0.45–4.5)
TSH SERPL DL<=0.05 MIU/L-ACNC: 0.67 UIU/ML (ref 0.36–3.74)
UNIT DIVISION, %UDIV: 0
UROBILINOGEN UR QL STRIP.AUTO: 0.2 EU/DL (ref 0.2–1)
UROBILINOGEN UR QL STRIP.AUTO: 0.2 EU/DL (ref 0.2–1)
UROBILINOGEN UR STRIP-MCNC: 0.2 MG/DL (ref 0.2–1)
VENTILATION MODE VENT: ABNORMAL
VENTRICULAR RATE, ECG03: 61 BPM
VIT B12 SERPL-MCNC: 695 PG/ML (ref 211–946)
VLDLC SERPL CALC-MCNC: 17 MG/DL (ref 5–40)
VLDLC SERPL CALC-MCNC: 28.6 MG/DL
VLDLC SERPL CALC-MCNC: 33.6 MG/DL
VOLUME CONTROL PLUS IVLCP: YES
VT SETTING VENT: 350 ML
WBC # BLD AUTO: 14.7 K/UL (ref 4.6–13.2)
WBC # BLD AUTO: 14.9 K/UL (ref 4.6–13.2)
WBC # BLD AUTO: 15 K/UL (ref 4.6–13.2)
WBC # BLD AUTO: 16.5 K/UL (ref 4.6–13.2)
WBC # BLD AUTO: 17.1 K/UL (ref 4.6–13.2)
WBC # BLD AUTO: 18.7 K/UL (ref 4.6–13.2)
WBC # BLD AUTO: 22.2 K/UL (ref 4.6–13.2)
WBC # BLD AUTO: 6.7 K/UL (ref 4.6–13.2)
WBC # BLD AUTO: 7 X10E3/UL (ref 3.4–10.8)
WBC # BLD AUTO: 9.6 K/UL (ref 4.6–13.2)
WBC #/AREA URNS HPF: ABNORMAL /HPF
WBC URNS QL MICRO: >100 /HPF (ref 0–4)
WBC URNS QL MICRO: NORMAL /HPF (ref 0–4)

## 2017-01-01 PROCEDURE — 84100 ASSAY OF PHOSPHORUS: CPT | Performed by: HOSPITALIST

## 2017-01-01 PROCEDURE — 76450000000

## 2017-01-01 PROCEDURE — 74011250637 HC RX REV CODE- 250/637: Performed by: INTERNAL MEDICINE

## 2017-01-01 PROCEDURE — 85730 THROMBOPLASTIN TIME PARTIAL: CPT | Performed by: EMERGENCY MEDICINE

## 2017-01-01 PROCEDURE — 71010 XR CHEST PORT: CPT

## 2017-01-01 PROCEDURE — 85025 COMPLETE CBC W/AUTO DIFF WBC: CPT | Performed by: HOSPITALIST

## 2017-01-01 PROCEDURE — 74011250637 HC RX REV CODE- 250/637: Performed by: PHYSICIAN ASSISTANT

## 2017-01-01 PROCEDURE — 84132 ASSAY OF SERUM POTASSIUM: CPT | Performed by: HOSPITALIST

## 2017-01-01 PROCEDURE — 74011250636 HC RX REV CODE- 250/636: Performed by: HOSPITALIST

## 2017-01-01 PROCEDURE — 74011000258 HC RX REV CODE- 258: Performed by: HOSPITALIST

## 2017-01-01 PROCEDURE — 36600 WITHDRAWAL OF ARTERIAL BLOOD: CPT

## 2017-01-01 PROCEDURE — G0299 HHS/HOSPICE OF RN EA 15 MIN: HCPCS

## 2017-01-01 PROCEDURE — 74011000250 HC RX REV CODE- 250: Performed by: HOSPITALIST

## 2017-01-01 PROCEDURE — 82962 GLUCOSE BLOOD TEST: CPT

## 2017-01-01 PROCEDURE — 82330 ASSAY OF CALCIUM: CPT | Performed by: HOSPITALIST

## 2017-01-01 PROCEDURE — 74011000258 HC RX REV CODE- 258: Performed by: INTERNAL MEDICINE

## 2017-01-01 PROCEDURE — 82803 BLOOD GASES ANY COMBINATION: CPT

## 2017-01-01 PROCEDURE — 77030018846 HC SOL IRR STRL H20 ICUM -A

## 2017-01-01 PROCEDURE — 80053 COMPREHEN METABOLIC PANEL: CPT | Performed by: EMERGENCY MEDICINE

## 2017-01-01 PROCEDURE — 85610 PROTHROMBIN TIME: CPT | Performed by: EMERGENCY MEDICINE

## 2017-01-01 PROCEDURE — C9113 INJ PANTOPRAZOLE SODIUM, VIA: HCPCS | Performed by: HOSPITALIST

## 2017-01-01 PROCEDURE — 83605 ASSAY OF LACTIC ACID: CPT | Performed by: PHYSICIAN ASSISTANT

## 2017-01-01 PROCEDURE — 36415 COLL VENOUS BLD VENIPUNCTURE: CPT | Performed by: HOSPITALIST

## 2017-01-01 PROCEDURE — 70450 CT HEAD/BRAIN W/O DYE: CPT

## 2017-01-01 PROCEDURE — 87077 CULTURE AEROBIC IDENTIFY: CPT | Performed by: PHYSICIAN ASSISTANT

## 2017-01-01 PROCEDURE — 80061 LIPID PANEL: CPT | Performed by: FAMILY MEDICINE

## 2017-01-01 PROCEDURE — 74011636637 HC RX REV CODE- 636/637: Performed by: HOSPITALIST

## 2017-01-01 PROCEDURE — 99001 SPECIMEN HANDLING PT-LAB: CPT | Performed by: FAMILY MEDICINE

## 2017-01-01 PROCEDURE — 94003 VENT MGMT INPAT SUBQ DAY: CPT

## 2017-01-01 PROCEDURE — 83735 ASSAY OF MAGNESIUM: CPT | Performed by: HOSPITALIST

## 2017-01-01 PROCEDURE — 85730 THROMBOPLASTIN TIME PARTIAL: CPT | Performed by: HOSPITALIST

## 2017-01-01 PROCEDURE — 74011250636 HC RX REV CODE- 250/636: Performed by: INTERNAL MEDICINE

## 2017-01-01 PROCEDURE — G0155 HHCP-SVS OF CSW,EA 15 MIN: HCPCS

## 2017-01-01 PROCEDURE — 74011250636 HC RX REV CODE- 250/636: Performed by: EMERGENCY MEDICINE

## 2017-01-01 PROCEDURE — 80076 HEPATIC FUNCTION PANEL: CPT | Performed by: HOSPITALIST

## 2017-01-01 PROCEDURE — 74011250636 HC RX REV CODE- 250/636: Performed by: FAMILY MEDICINE

## 2017-01-01 PROCEDURE — 74011000250 HC RX REV CODE- 250

## 2017-01-01 PROCEDURE — 65610000009 HC RM ICU NEURO

## 2017-01-01 PROCEDURE — 74011000258 HC RX REV CODE- 258: Performed by: FAMILY MEDICINE

## 2017-01-01 PROCEDURE — 3336500001 HSPC ELECTION

## 2017-01-01 PROCEDURE — 0651 HSPC ROUTINE HOME CARE

## 2017-01-01 PROCEDURE — 96365 THER/PROPH/DIAG IV INF INIT: CPT

## 2017-01-01 PROCEDURE — 85610 PROTHROMBIN TIME: CPT | Performed by: HOSPITALIST

## 2017-01-01 PROCEDURE — 65270000029 HC RM PRIVATE

## 2017-01-01 PROCEDURE — 82140 ASSAY OF AMMONIA: CPT | Performed by: HOSPITALIST

## 2017-01-01 PROCEDURE — 77030018836 HC SOL IRR NACL ICUM -A

## 2017-01-01 PROCEDURE — 74011250637 HC RX REV CODE- 250/637: Performed by: HOSPITALIST

## 2017-01-01 PROCEDURE — 36415 COLL VENOUS BLD VENIPUNCTURE: CPT | Performed by: INTERNAL MEDICINE

## 2017-01-01 PROCEDURE — 84100 ASSAY OF PHOSPHORUS: CPT | Performed by: INTERNAL MEDICINE

## 2017-01-01 PROCEDURE — 86900 BLOOD TYPING SEROLOGIC ABO: CPT | Performed by: HOSPITALIST

## 2017-01-01 PROCEDURE — 93005 ELECTROCARDIOGRAM TRACING: CPT

## 2017-01-01 PROCEDURE — 83690 ASSAY OF LIPASE: CPT | Performed by: HOSPITALIST

## 2017-01-01 PROCEDURE — 82533 TOTAL CORTISOL: CPT | Performed by: HOSPITALIST

## 2017-01-01 PROCEDURE — 80048 BASIC METABOLIC PNL TOTAL CA: CPT | Performed by: HOSPITALIST

## 2017-01-01 PROCEDURE — 87186 SC STD MICRODIL/AGAR DIL: CPT | Performed by: PHYSICIAN ASSISTANT

## 2017-01-01 PROCEDURE — 74011250636 HC RX REV CODE- 250/636: Performed by: PHYSICIAN ASSISTANT

## 2017-01-01 PROCEDURE — A6213 FOAM DRG >16<=48 SQ IN W/BDR: HCPCS

## 2017-01-01 PROCEDURE — 77030034848

## 2017-01-01 PROCEDURE — 89220 SPUTUM SPECIMEN COLLECTION: CPT

## 2017-01-01 PROCEDURE — 86880 COOMBS TEST DIRECT: CPT | Performed by: HOSPITALIST

## 2017-01-01 PROCEDURE — 83036 HEMOGLOBIN GLYCOSYLATED A1C: CPT | Performed by: HOSPITALIST

## 2017-01-01 PROCEDURE — 74011000250 HC RX REV CODE- 250: Performed by: EMERGENCY MEDICINE

## 2017-01-01 PROCEDURE — 99285 EMERGENCY DEPT VISIT HI MDM: CPT

## 2017-01-01 PROCEDURE — 96375 TX/PRO/DX INJ NEW DRUG ADDON: CPT

## 2017-01-01 PROCEDURE — 74011000250 HC RX REV CODE- 250: Performed by: INTERNAL MEDICINE

## 2017-01-01 PROCEDURE — 86870 RBC ANTIBODY IDENTIFICATION: CPT | Performed by: HOSPITALIST

## 2017-01-01 PROCEDURE — 86141 C-REACTIVE PROTEIN HS: CPT | Performed by: HOSPITALIST

## 2017-01-01 PROCEDURE — 74011636637 HC RX REV CODE- 636/637: Performed by: PHYSICIAN ASSISTANT

## 2017-01-01 PROCEDURE — 82272 OCCULT BLD FECES 1-3 TESTS: CPT | Performed by: HOSPITALIST

## 2017-01-01 PROCEDURE — 74011000250 HC RX REV CODE- 250: Performed by: PHYSICIAN ASSISTANT

## 2017-01-01 PROCEDURE — 86920 COMPATIBILITY TEST SPIN: CPT | Performed by: HOSPITALIST

## 2017-01-01 PROCEDURE — 87086 URINE CULTURE/COLONY COUNT: CPT | Performed by: PHYSICIAN ASSISTANT

## 2017-01-01 PROCEDURE — 94400 HC END TIDAL CO2 RESPONSE CURVE: CPT

## 2017-01-01 PROCEDURE — 77030009834 HC MSK O2 TRACH VYRM -A

## 2017-01-01 PROCEDURE — 84443 ASSAY THYROID STIM HORMONE: CPT | Performed by: FAMILY MEDICINE

## 2017-01-01 PROCEDURE — A6209 FOAM DRSG <=16 SQ IN W/O BDR: HCPCS

## 2017-01-01 PROCEDURE — 86906 BLD TYPING SEROLOGIC RH PHNT: CPT | Performed by: HOSPITALIST

## 2017-01-01 PROCEDURE — 85025 COMPLETE CBC W/AUTO DIFF WBC: CPT | Performed by: EMERGENCY MEDICINE

## 2017-01-01 PROCEDURE — 85025 COMPLETE CBC W/AUTO DIFF WBC: CPT | Performed by: FAMILY MEDICINE

## 2017-01-01 PROCEDURE — 96366 THER/PROPH/DIAG IV INF ADDON: CPT

## 2017-01-01 PROCEDURE — 94762 N-INVAS EAR/PLS OXIMTRY CONT: CPT

## 2017-01-01 PROCEDURE — 36415 COLL VENOUS BLD VENIPUNCTURE: CPT | Performed by: FAMILY MEDICINE

## 2017-01-01 PROCEDURE — 82150 ASSAY OF AMYLASE: CPT | Performed by: HOSPITALIST

## 2017-01-01 PROCEDURE — 86922 COMPATIBILITY TEST ANTIGLOB: CPT | Performed by: HOSPITALIST

## 2017-01-01 PROCEDURE — 86921 COMPATIBILITY TEST INCUBATE: CPT | Performed by: HOSPITALIST

## 2017-01-01 PROCEDURE — 87040 BLOOD CULTURE FOR BACTERIA: CPT | Performed by: PHYSICIAN ASSISTANT

## 2017-01-01 PROCEDURE — 85610 PROTHROMBIN TIME: CPT

## 2017-01-01 PROCEDURE — 77030034849

## 2017-01-01 PROCEDURE — 74011250636 HC RX REV CODE- 250/636

## 2017-01-01 PROCEDURE — 77030020263 HC SOL INJ SOD CL0.9% LFCR 1000ML

## 2017-01-01 PROCEDURE — 85652 RBC SED RATE AUTOMATED: CPT | Performed by: HOSPITALIST

## 2017-01-01 PROCEDURE — 84439 ASSAY OF FREE THYROXINE: CPT | Performed by: FAMILY MEDICINE

## 2017-01-01 PROCEDURE — 87070 CULTURE OTHR SPECIMN AEROBIC: CPT | Performed by: PHYSICIAN ASSISTANT

## 2017-01-01 PROCEDURE — 31500 INSERT EMERGENCY AIRWAY: CPT

## 2017-01-01 PROCEDURE — 86850 RBC ANTIBODY SCREEN: CPT | Performed by: HOSPITALIST

## 2017-01-01 PROCEDURE — 5A1955Z RESPIRATORY VENTILATION, GREATER THAN 96 CONSECUTIVE HOURS: ICD-10-PCS | Performed by: INTERNAL MEDICINE

## 2017-01-01 PROCEDURE — 0BH17EZ INSERTION OF ENDOTRACHEAL AIRWAY INTO TRACHEA, VIA NATURAL OR ARTIFICIAL OPENING: ICD-10-PCS | Performed by: EMERGENCY MEDICINE

## 2017-01-01 PROCEDURE — HOSPICE MEDICATION HC HH HOSPICE MEDICATION

## 2017-01-01 PROCEDURE — 84132 ASSAY OF SERUM POTASSIUM: CPT | Performed by: INTERNAL MEDICINE

## 2017-01-01 PROCEDURE — 82728 ASSAY OF FERRITIN: CPT | Performed by: FAMILY MEDICINE

## 2017-01-01 PROCEDURE — 81001 URINALYSIS AUTO W/SCOPE: CPT | Performed by: FAMILY MEDICINE

## 2017-01-01 PROCEDURE — 94002 VENT MGMT INPAT INIT DAY: CPT

## 2017-01-01 PROCEDURE — 96368 THER/DIAG CONCURRENT INF: CPT

## 2017-01-01 PROCEDURE — 81001 URINALYSIS AUTO W/SCOPE: CPT | Performed by: PHYSICIAN ASSISTANT

## 2017-01-01 PROCEDURE — 77030020245 HC SOL INJ 5% D/0.9%NACL

## 2017-01-01 PROCEDURE — 83735 ASSAY OF MAGNESIUM: CPT | Performed by: INTERNAL MEDICINE

## 2017-01-01 PROCEDURE — 80053 COMPREHEN METABOLIC PANEL: CPT | Performed by: FAMILY MEDICINE

## 2017-01-01 PROCEDURE — 86905 BLOOD TYPING RBC ANTIGENS: CPT | Performed by: HOSPITALIST

## 2017-01-01 PROCEDURE — 77030020454 HC TU ET CUF HI/LO COVD -B

## 2017-01-01 PROCEDURE — 80061 LIPID PANEL: CPT | Performed by: HOSPITALIST

## 2017-01-01 PROCEDURE — 82550 ASSAY OF CK (CPK): CPT | Performed by: HOSPITALIST

## 2017-01-01 PROCEDURE — 85025 COMPLETE CBC W/AUTO DIFF WBC: CPT | Performed by: PHYSICIAN ASSISTANT

## 2017-01-01 RX ORDER — SUCCINYLCHOLINE CHLORIDE 20 MG/ML
100 INJECTION INTRAMUSCULAR; INTRAVENOUS
Status: COMPLETED | OUTPATIENT
Start: 2017-01-01 | End: 2017-01-01

## 2017-01-01 RX ORDER — NICARDIPINE HYDROCHLORIDE 0.1 MG/ML
INJECTION INTRAVENOUS
Status: COMPLETED
Start: 2017-01-01 | End: 2017-01-01

## 2017-01-01 RX ORDER — MAGNESIUM SULFATE 1 G/100ML
1 INJECTION INTRAVENOUS ONCE
Status: COMPLETED | OUTPATIENT
Start: 2017-01-01 | End: 2017-01-01

## 2017-01-01 RX ORDER — PROPOFOL 10 MG/ML
INJECTION, EMULSION INTRAVENOUS
Status: COMPLETED
Start: 2017-01-01 | End: 2017-01-01

## 2017-01-01 RX ORDER — METOPROLOL TARTRATE 5 MG/5ML
2.5 INJECTION INTRAVENOUS EVERY 6 HOURS
Status: DISCONTINUED | OUTPATIENT
Start: 2017-01-01 | End: 2017-01-01

## 2017-01-01 RX ORDER — ETOMIDATE 2 MG/ML
INJECTION INTRAVENOUS
Status: DISPENSED
Start: 2017-01-01 | End: 2017-01-01

## 2017-01-01 RX ORDER — LABETALOL HCL 20 MG/4 ML
10 SYRINGE (ML) INTRAVENOUS
Status: DISCONTINUED | OUTPATIENT
Start: 2017-01-01 | End: 2017-01-01

## 2017-01-01 RX ORDER — SUCCINYLCHOLINE CHLORIDE 20 MG/ML
INJECTION INTRAMUSCULAR; INTRAVENOUS
Status: DISPENSED
Start: 2017-01-01 | End: 2017-01-01

## 2017-01-01 RX ORDER — DEXTROSE, SODIUM CHLORIDE, AND POTASSIUM CHLORIDE 5; .9; .15 G/100ML; G/100ML; G/100ML
37.4 INJECTION INTRAVENOUS CONTINUOUS
Status: DISCONTINUED | OUTPATIENT
Start: 2017-01-01 | End: 2017-01-01

## 2017-01-01 RX ORDER — ONDANSETRON 2 MG/ML
1 INJECTION INTRAMUSCULAR; INTRAVENOUS
Status: DISCONTINUED | OUTPATIENT
Start: 2017-01-01 | End: 2017-01-01

## 2017-01-01 RX ORDER — POTASSIUM CHLORIDE 20 MEQ/1
20 TABLET, EXTENDED RELEASE ORAL
Status: COMPLETED | OUTPATIENT
Start: 2017-01-01 | End: 2017-01-01

## 2017-01-01 RX ORDER — LEVOFLOXACIN 5 MG/ML
500 INJECTION, SOLUTION INTRAVENOUS ONCE
Status: COMPLETED | OUTPATIENT
Start: 2017-01-01 | End: 2017-01-01

## 2017-01-01 RX ORDER — HALOPERIDOL 2 MG/ML
1-2 SOLUTION ORAL
Status: DISCONTINUED | OUTPATIENT
Start: 2017-01-01 | End: 2017-01-01 | Stop reason: HOSPADM

## 2017-01-01 RX ORDER — FACIAL-BODY WIPES
10 EACH TOPICAL DAILY PRN
Status: DISCONTINUED | OUTPATIENT
Start: 2017-01-01 | End: 2017-01-01 | Stop reason: HOSPADM

## 2017-01-01 RX ORDER — SODIUM,POTASSIUM PHOSPHATES 280-250MG
2 POWDER IN PACKET (EA) ORAL
Status: DISCONTINUED | OUTPATIENT
Start: 2017-01-01 | End: 2017-01-01

## 2017-01-01 RX ORDER — GLYCOPYRROLATE 0.2 MG/ML
0.2 INJECTION INTRAMUSCULAR; INTRAVENOUS
Status: DISCONTINUED | OUTPATIENT
Start: 2017-01-01 | End: 2017-01-01 | Stop reason: HOSPADM

## 2017-01-01 RX ORDER — LORAZEPAM 2 MG/ML
0.5 INJECTION INTRAMUSCULAR
Status: DISCONTINUED | OUTPATIENT
Start: 2017-01-01 | End: 2017-01-01 | Stop reason: HOSPADM

## 2017-01-01 RX ORDER — POTASSIUM CHLORIDE 1.5 G/1.77G
20 POWDER, FOR SOLUTION ORAL ONCE
Status: COMPLETED | OUTPATIENT
Start: 2017-01-01 | End: 2017-01-01

## 2017-01-01 RX ORDER — SODIUM,POTASSIUM PHOSPHATES 280-250MG
2 POWDER IN PACKET (EA) ORAL
Status: COMPLETED | OUTPATIENT
Start: 2017-01-01 | End: 2017-01-01

## 2017-01-01 RX ORDER — POTASSIUM CHLORIDE, DEXTROSE MONOHYDRATE AND SODIUM CHLORIDE 300; 5; 900 MG/100ML; G/100ML; MG/100ML
INJECTION, SOLUTION INTRAVENOUS CONTINUOUS
Status: DISCONTINUED | OUTPATIENT
Start: 2017-01-01 | End: 2017-01-01

## 2017-01-01 RX ORDER — INSULIN LISPRO 100 [IU]/ML
INJECTION, SOLUTION INTRAVENOUS; SUBCUTANEOUS EVERY 6 HOURS
Status: DISCONTINUED | OUTPATIENT
Start: 2017-01-01 | End: 2017-01-01

## 2017-01-01 RX ORDER — ETOMIDATE 2 MG/ML
INJECTION INTRAVENOUS
Status: COMPLETED
Start: 2017-01-01 | End: 2017-01-01

## 2017-01-01 RX ORDER — BISACODYL 5 MG
5 TABLET, DELAYED RELEASE (ENTERIC COATED) ORAL DAILY PRN
Status: DISCONTINUED | OUTPATIENT
Start: 2017-01-01 | End: 2017-01-01 | Stop reason: HOSPADM

## 2017-01-01 RX ORDER — POTASSIUM CHLORIDE 1.5 G/1.77G
10 POWDER, FOR SOLUTION ORAL
Status: COMPLETED | OUTPATIENT
Start: 2017-01-01 | End: 2017-01-01

## 2017-01-01 RX ORDER — PROPOFOL 10 MG/ML
5-50 VIAL (ML) INTRAVENOUS
Status: DISCONTINUED | OUTPATIENT
Start: 2017-01-01 | End: 2017-01-01

## 2017-01-01 RX ORDER — MORPHINE SULFATE 5 MG/ML
INJECTION, SOLUTION INTRAVENOUS CONTINUOUS
Status: DISCONTINUED | OUTPATIENT
Start: 2017-01-01 | End: 2017-01-01 | Stop reason: HOSPADM

## 2017-01-01 RX ORDER — SODIUM,POTASSIUM PHOSPHATES 280-250MG
2 POWDER IN PACKET (EA) ORAL ONCE
Status: COMPLETED | OUTPATIENT
Start: 2017-01-01 | End: 2017-01-01

## 2017-01-01 RX ORDER — METOPROLOL TARTRATE 25 MG/1
12.5 TABLET, FILM COATED ORAL EVERY 12 HOURS
Status: DISCONTINUED | OUTPATIENT
Start: 2017-01-01 | End: 2017-01-01

## 2017-01-01 RX ORDER — ACETAMINOPHEN 325 MG/1
650 TABLET ORAL
Status: DISCONTINUED | OUTPATIENT
Start: 2017-01-01 | End: 2017-01-01 | Stop reason: HOSPADM

## 2017-01-01 RX ORDER — ONDANSETRON 2 MG/ML
4 INJECTION INTRAMUSCULAR; INTRAVENOUS
Status: COMPLETED | OUTPATIENT
Start: 2017-01-01 | End: 2017-01-01

## 2017-01-01 RX ORDER — AMLODIPINE BESYLATE 5 MG/1
10 TABLET ORAL DAILY
Status: DISCONTINUED | OUTPATIENT
Start: 2017-01-01 | End: 2017-01-01

## 2017-01-01 RX ORDER — DEXTROSE MONOHYDRATE AND SODIUM CHLORIDE 5; .9 G/100ML; G/100ML
0.75 INJECTION, SOLUTION INTRAVENOUS CONTINUOUS
Status: DISCONTINUED | OUTPATIENT
Start: 2017-01-01 | End: 2017-01-01

## 2017-01-01 RX ORDER — LEVOTHYROXINE SODIUM 75 UG/1
75 TABLET ORAL DAILY
Qty: 90 TAB | Refills: 4 | Status: SHIPPED | OUTPATIENT
Start: 2017-01-01 | End: 2017-01-01

## 2017-01-01 RX ORDER — DEXTROSE MONOHYDRATE AND SODIUM CHLORIDE 5; .9 G/100ML; G/100ML
37.4 INJECTION, SOLUTION INTRAVENOUS CONTINUOUS
Status: DISCONTINUED | OUTPATIENT
Start: 2017-01-01 | End: 2017-01-01

## 2017-01-01 RX ORDER — PROMETHAZINE HYDROCHLORIDE 25 MG/1
25 SUPPOSITORY RECTAL
Status: DISCONTINUED | OUTPATIENT
Start: 2017-01-01 | End: 2017-01-01 | Stop reason: HOSPADM

## 2017-01-01 RX ORDER — NICARDIPINE HYDROCHLORIDE 0.1 MG/ML
INJECTION INTRAVENOUS
Status: DISCONTINUED
Start: 2017-01-01 | End: 2017-01-01 | Stop reason: CLARIF

## 2017-01-01 RX ORDER — POTASSIUM CHLORIDE 1.5 G/1.77G
40 POWDER, FOR SOLUTION ORAL
Status: DISCONTINUED | OUTPATIENT
Start: 2017-01-01 | End: 2017-01-01

## 2017-01-01 RX ORDER — MAGNESIUM SULFATE 100 %
4 CRYSTALS MISCELLANEOUS AS NEEDED
Status: DISCONTINUED | OUTPATIENT
Start: 2017-01-01 | End: 2017-01-01

## 2017-01-01 RX ORDER — FENTANYL CITRATE 50 UG/ML
50 INJECTION, SOLUTION INTRAMUSCULAR; INTRAVENOUS
Status: DISCONTINUED | OUTPATIENT
Start: 2017-01-01 | End: 2017-01-01

## 2017-01-01 RX ORDER — ONDANSETRON 4 MG/1
4 TABLET, ORALLY DISINTEGRATING ORAL
Status: DISCONTINUED | OUTPATIENT
Start: 2017-01-01 | End: 2017-01-01 | Stop reason: HOSPADM

## 2017-01-01 RX ORDER — ACETAMINOPHEN 650 MG/1
650 SUPPOSITORY RECTAL
Status: DISCONTINUED | OUTPATIENT
Start: 2017-01-01 | End: 2017-01-01 | Stop reason: HOSPADM

## 2017-01-01 RX ORDER — ALBUTEROL SULFATE 0.83 MG/ML
2.5 SOLUTION RESPIRATORY (INHALATION)
Status: DISCONTINUED | OUTPATIENT
Start: 2017-01-01 | End: 2017-01-01 | Stop reason: HOSPADM

## 2017-01-01 RX ORDER — LEVOFLOXACIN 5 MG/ML
250 INJECTION, SOLUTION INTRAVENOUS EVERY 24 HOURS
Status: DISCONTINUED | OUTPATIENT
Start: 2017-01-01 | End: 2017-01-01

## 2017-01-01 RX ORDER — ETOMIDATE 2 MG/ML
20 INJECTION INTRAVENOUS
Status: COMPLETED | OUTPATIENT
Start: 2017-01-01 | End: 2017-01-01

## 2017-01-01 RX ORDER — CHLORHEXIDINE GLUCONATE 1.2 MG/ML
15 RINSE ORAL EVERY 12 HOURS
Status: DISCONTINUED | OUTPATIENT
Start: 2017-01-01 | End: 2017-01-01

## 2017-01-01 RX ORDER — SUCCINYLCHOLINE CHLORIDE 20 MG/ML
INJECTION INTRAMUSCULAR; INTRAVENOUS
Status: COMPLETED
Start: 2017-01-01 | End: 2017-01-01

## 2017-01-01 RX ORDER — POTASSIUM CHLORIDE 7.45 MG/ML
10 INJECTION INTRAVENOUS
Status: COMPLETED | OUTPATIENT
Start: 2017-01-01 | End: 2017-01-01

## 2017-01-01 RX ORDER — POTASSIUM CHLORIDE 20 MEQ/1
20 TABLET, EXTENDED RELEASE ORAL ONCE
Status: DISCONTINUED | OUTPATIENT
Start: 2017-01-01 | End: 2017-01-01

## 2017-01-01 RX ORDER — AMOXICILLIN 250 MG
2 CAPSULE ORAL
Status: DISCONTINUED | OUTPATIENT
Start: 2017-01-01 | End: 2017-01-01

## 2017-01-01 RX ORDER — POTASSIUM CHLORIDE 750 MG/1
10 TABLET, FILM COATED, EXTENDED RELEASE ORAL ONCE
Status: COMPLETED | OUTPATIENT
Start: 2017-01-01 | End: 2017-01-01

## 2017-01-01 RX ORDER — ENALAPRIL MALEATE 5 MG/1
TABLET ORAL
Qty: 90 TAB | Refills: 4 | Status: SHIPPED | OUTPATIENT
Start: 2017-01-01 | End: 2017-01-01

## 2017-01-01 RX ORDER — SCOLOPAMINE TRANSDERMAL SYSTEM 1 MG/1
1.5 PATCH, EXTENDED RELEASE TRANSDERMAL
Status: DISCONTINUED | OUTPATIENT
Start: 2017-01-01 | End: 2017-01-01 | Stop reason: HOSPADM

## 2017-01-01 RX ORDER — DEXTROSE 50 % IN WATER (D50W) INTRAVENOUS SYRINGE
25-50 AS NEEDED
Status: DISCONTINUED | OUTPATIENT
Start: 2017-01-01 | End: 2017-01-01

## 2017-01-01 RX ADMIN — INSULIN DETEMIR 7 UNITS: 100 INJECTION, SOLUTION SUBCUTANEOUS at 12:41

## 2017-01-01 RX ADMIN — FOLIC ACID: 5 INJECTION, SOLUTION INTRAMUSCULAR; INTRAVENOUS; SUBCUTANEOUS at 17:08

## 2017-01-01 RX ADMIN — LABETALOL HYDROCHLORIDE 10 MG: 5 INJECTION, SOLUTION INTRAVENOUS at 17:08

## 2017-01-01 RX ADMIN — SODIUM CHLORIDE 40 MG: 9 INJECTION INTRAMUSCULAR; INTRAVENOUS; SUBCUTANEOUS at 20:38

## 2017-01-01 RX ADMIN — NICARDIPINE HYDROCHLORIDE 2.5 MG/HR: 2.5 INJECTION INTRAVENOUS at 16:10

## 2017-01-01 RX ADMIN — INSULIN LISPRO 2 UNITS: 100 INJECTION, SOLUTION INTRAVENOUS; SUBCUTANEOUS at 06:28

## 2017-01-01 RX ADMIN — NICARDIPINE HYDROCHLORIDE: 0.1 INJECTION, SOLUTION INTRAVENOUS at 02:00

## 2017-01-01 RX ADMIN — POTASSIUM CHLORIDE 20 MEQ: 20 TABLET, EXTENDED RELEASE ORAL at 07:20

## 2017-01-01 RX ADMIN — SODIUM CHLORIDE 40 MG: 9 INJECTION INTRAMUSCULAR; INTRAVENOUS; SUBCUTANEOUS at 20:20

## 2017-01-01 RX ADMIN — SODIUM CHLORIDE 40 MG: 9 INJECTION INTRAMUSCULAR; INTRAVENOUS; SUBCUTANEOUS at 21:52

## 2017-01-01 RX ADMIN — INSULIN LISPRO 2 UNITS: 100 INJECTION, SOLUTION INTRAVENOUS; SUBCUTANEOUS at 00:40

## 2017-01-01 RX ADMIN — SUCCINYLCHOLINE CHLORIDE 100 MG: 20 INJECTION, SOLUTION INTRAMUSCULAR; INTRAVENOUS at 19:24

## 2017-01-01 RX ADMIN — INSULIN LISPRO 2 UNITS: 100 INJECTION, SOLUTION INTRAVENOUS; SUBCUTANEOUS at 13:09

## 2017-01-01 RX ADMIN — INSULIN LISPRO 6 UNITS: 100 INJECTION, SOLUTION INTRAVENOUS; SUBCUTANEOUS at 06:00

## 2017-01-01 RX ADMIN — ALBUTEROL SULFATE 2.5 MG: 2.5 SOLUTION RESPIRATORY (INHALATION) at 22:12

## 2017-01-01 RX ADMIN — FOLIC ACID: 5 INJECTION, SOLUTION INTRAMUSCULAR; INTRAVENOUS; SUBCUTANEOUS at 16:49

## 2017-01-01 RX ADMIN — MORPHINE SULFATE: 5 INJECTION, SOLUTION INTRAVENOUS at 17:06

## 2017-01-01 RX ADMIN — INSULIN LISPRO 2 UNITS: 100 INJECTION, SOLUTION INTRAVENOUS; SUBCUTANEOUS at 06:12

## 2017-01-01 RX ADMIN — DEXTROSE MONOHYDRATE, SODIUM CHLORIDE, AND POTASSIUM CHLORIDE: 50; 9; 2.98 INJECTION, SOLUTION INTRAVENOUS at 00:14

## 2017-01-01 RX ADMIN — LORAZEPAM 0.5 MG: 2 INJECTION INTRAMUSCULAR at 19:12

## 2017-01-01 RX ADMIN — ETOMIDATE 20 MG: 2 INJECTION INTRAVENOUS at 19:29

## 2017-01-01 RX ADMIN — NICARDIPINE HYDROCHLORIDE 5 MG/HR: 2.5 INJECTION INTRAVENOUS at 10:54

## 2017-01-01 RX ADMIN — SODIUM CHLORIDE 40 MG: 9 INJECTION INTRAMUSCULAR; INTRAVENOUS; SUBCUTANEOUS at 08:44

## 2017-01-01 RX ADMIN — LABETALOL HYDROCHLORIDE 10 MG: 5 INJECTION, SOLUTION INTRAVENOUS at 15:04

## 2017-01-01 RX ADMIN — SODIUM CHLORIDE 6 MMOL: 900 INJECTION, SOLUTION INTRAVENOUS at 08:14

## 2017-01-01 RX ADMIN — MORPHINE SULFATE: 5 INJECTION, SOLUTION INTRAVENOUS at 19:42

## 2017-01-01 RX ADMIN — AMLODIPINE BESYLATE 10 MG: 10 TABLET ORAL at 08:22

## 2017-01-01 RX ADMIN — STANDARDIZED SENNA CONCENTRATE AND DOCUSATE SODIUM 2 TABLET: 8.6; 5 TABLET, FILM COATED ORAL at 21:51

## 2017-01-01 RX ADMIN — NICARDIPINE HYDROCHLORIDE: 0.1 INJECTION, SOLUTION INTRAVENOUS at 00:29

## 2017-01-01 RX ADMIN — INSULIN LISPRO 2 UNITS: 100 INJECTION, SOLUTION INTRAVENOUS; SUBCUTANEOUS at 00:16

## 2017-01-01 RX ADMIN — INSULIN LISPRO 9 UNITS: 100 INJECTION, SOLUTION INTRAVENOUS; SUBCUTANEOUS at 11:28

## 2017-01-01 RX ADMIN — INSULIN LISPRO 2 UNITS: 100 INJECTION, SOLUTION INTRAVENOUS; SUBCUTANEOUS at 12:23

## 2017-01-01 RX ADMIN — NICARDIPINE HYDROCHLORIDE 5 MG/HR: 2.5 INJECTION INTRAVENOUS at 20:35

## 2017-01-01 RX ADMIN — ACETAMINOPHEN 650 MG: 325 TABLET, FILM COATED ORAL at 17:35

## 2017-01-01 RX ADMIN — NICARDIPINE HYDROCHLORIDE 5 MG/HR: 2.5 INJECTION INTRAVENOUS at 23:28

## 2017-01-01 RX ADMIN — MEROPENEM 1 G: 1 INJECTION, POWDER, FOR SOLUTION INTRAVENOUS at 21:44

## 2017-01-01 RX ADMIN — CHLORHEXIDINE GLUCONATE 15 ML: 1.2 RINSE ORAL at 20:38

## 2017-01-01 RX ADMIN — POTASSIUM CHLORIDE 10 MEQ: 750 TABLET, FILM COATED, EXTENDED RELEASE ORAL at 10:13

## 2017-01-01 RX ADMIN — SODIUM CHLORIDE 40 MG: 9 INJECTION INTRAMUSCULAR; INTRAVENOUS; SUBCUTANEOUS at 21:41

## 2017-01-01 RX ADMIN — FOLIC ACID: 5 INJECTION, SOLUTION INTRAMUSCULAR; INTRAVENOUS; SUBCUTANEOUS at 15:53

## 2017-01-01 RX ADMIN — POTASSIUM CHLORIDE 10 MEQ: 1.5 POWDER, FOR SOLUTION ORAL at 05:53

## 2017-01-01 RX ADMIN — SODIUM CHLORIDE 40 MG: 9 INJECTION INTRAMUSCULAR; INTRAVENOUS; SUBCUTANEOUS at 10:36

## 2017-01-01 RX ADMIN — NICARDIPINE HYDROCHLORIDE 2.5 MG/HR: 2.5 INJECTION INTRAVENOUS at 06:27

## 2017-01-01 RX ADMIN — POTASSIUM CHLORIDE 10 MEQ: 1.5 POWDER, FOR SOLUTION ORAL at 08:22

## 2017-01-01 RX ADMIN — SODIUM CHLORIDE 40 MG: 9 INJECTION INTRAMUSCULAR; INTRAVENOUS; SUBCUTANEOUS at 21:21

## 2017-01-01 RX ADMIN — METOPROLOL TARTRATE 12.5 MG: 25 TABLET ORAL at 08:25

## 2017-01-01 RX ADMIN — ONDANSETRON 4 MG: 2 INJECTION INTRAMUSCULAR; INTRAVENOUS at 19:13

## 2017-01-01 RX ADMIN — INSULIN LISPRO 6 UNITS: 100 INJECTION, SOLUTION INTRAVENOUS; SUBCUTANEOUS at 12:06

## 2017-01-01 RX ADMIN — INSULIN LISPRO 4 UNITS: 100 INJECTION, SOLUTION INTRAVENOUS; SUBCUTANEOUS at 23:31

## 2017-01-01 RX ADMIN — METOPROLOL TARTRATE 12.5 MG: 25 TABLET ORAL at 10:13

## 2017-01-01 RX ADMIN — NICARDIPINE HYDROCHLORIDE 10 MG/HR: 2.5 INJECTION INTRAVENOUS at 12:19

## 2017-01-01 RX ADMIN — POTASSIUM & SODIUM PHOSPHATES POWDER PACK 280-160-250 MG 2 PACKET: 280-160-250 PACK at 12:00

## 2017-01-01 RX ADMIN — LABETALOL HYDROCHLORIDE 10 MG: 5 INJECTION, SOLUTION INTRAVENOUS at 10:35

## 2017-01-01 RX ADMIN — SODIUM CHLORIDE 40 MG: 9 INJECTION INTRAMUSCULAR; INTRAVENOUS; SUBCUTANEOUS at 10:13

## 2017-01-01 RX ADMIN — FENTANYL CITRATE 50 MCG: 50 INJECTION, SOLUTION INTRAMUSCULAR; INTRAVENOUS at 22:30

## 2017-01-01 RX ADMIN — INSULIN LISPRO 9 UNITS: 100 INJECTION, SOLUTION INTRAVENOUS; SUBCUTANEOUS at 12:40

## 2017-01-01 RX ADMIN — POTASSIUM & SODIUM PHOSPHATES POWDER PACK 280-160-250 MG 2 PACKET: 280-160-250 PACK at 06:19

## 2017-01-01 RX ADMIN — CHLORHEXIDINE GLUCONATE 15 ML: 1.2 RINSE ORAL at 08:47

## 2017-01-01 RX ADMIN — SUCCINYLCHOLINE CHLORIDE 100 MG: 20 INJECTION INTRAMUSCULAR; INTRAVENOUS at 19:24

## 2017-01-01 RX ADMIN — INSULIN LISPRO 2 UNITS: 100 INJECTION, SOLUTION INTRAVENOUS; SUBCUTANEOUS at 17:54

## 2017-01-01 RX ADMIN — POTASSIUM CHLORIDE 10 MEQ: 1.5 POWDER, FOR SOLUTION ORAL at 07:00

## 2017-01-01 RX ADMIN — STANDARDIZED SENNA CONCENTRATE AND DOCUSATE SODIUM 2 TABLET: 8.6; 5 TABLET, FILM COATED ORAL at 21:21

## 2017-01-01 RX ADMIN — POTASSIUM CHLORIDE 10 MEQ: 7.46 INJECTION, SOLUTION INTRAVENOUS at 13:30

## 2017-01-01 RX ADMIN — SODIUM CHLORIDE 40 MG: 9 INJECTION INTRAMUSCULAR; INTRAVENOUS; SUBCUTANEOUS at 23:03

## 2017-01-01 RX ADMIN — SODIUM CHLORIDE 40 MG: 9 INJECTION INTRAMUSCULAR; INTRAVENOUS; SUBCUTANEOUS at 08:22

## 2017-01-01 RX ADMIN — CHLORHEXIDINE GLUCONATE 15 ML: 1.2 RINSE ORAL at 21:00

## 2017-01-01 RX ADMIN — DEXTROSE MONOHYDRATE, SODIUM CHLORIDE, AND POTASSIUM CHLORIDE: 50; 9; 2.98 INJECTION, SOLUTION INTRAVENOUS at 06:42

## 2017-01-01 RX ADMIN — AMLODIPINE BESYLATE 10 MG: 10 TABLET ORAL at 08:25

## 2017-01-01 RX ADMIN — ACETAMINOPHEN 650 MG: 325 TABLET, FILM COATED ORAL at 12:42

## 2017-01-01 RX ADMIN — NICARDIPINE HYDROCHLORIDE: 0.1 INJECTION, SOLUTION INTRAVENOUS at 07:32

## 2017-01-01 RX ADMIN — MEROPENEM 1 G: 1 INJECTION, POWDER, FOR SOLUTION INTRAVENOUS at 08:01

## 2017-01-01 RX ADMIN — INSULIN LISPRO 3 UNITS: 100 INJECTION, SOLUTION INTRAVENOUS; SUBCUTANEOUS at 05:59

## 2017-01-01 RX ADMIN — LABETALOL HYDROCHLORIDE 10 MG: 5 INJECTION, SOLUTION INTRAVENOUS at 03:32

## 2017-01-01 RX ADMIN — POTASSIUM & SODIUM PHOSPHATES POWDER PACK 280-160-250 MG 2 PACKET: 280-160-250 PACK at 10:36

## 2017-01-01 RX ADMIN — CHLORHEXIDINE GLUCONATE 15 ML: 1.2 RINSE ORAL at 08:22

## 2017-01-01 RX ADMIN — DEXTROSE MONOHYDRATE, SODIUM CHLORIDE, AND POTASSIUM CHLORIDE: 50; 9; 2.98 INJECTION, SOLUTION INTRAVENOUS at 08:38

## 2017-01-01 RX ADMIN — LEVOFLOXACIN 500 MG: 5 INJECTION, SOLUTION INTRAVENOUS at 17:00

## 2017-01-01 RX ADMIN — AMLODIPINE BESYLATE 10 MG: 10 TABLET ORAL at 08:44

## 2017-01-01 RX ADMIN — NICARDIPINE HYDROCHLORIDE 5 MG/HR: 2.5 INJECTION INTRAVENOUS at 20:06

## 2017-01-01 RX ADMIN — STANDARDIZED SENNA CONCENTRATE AND DOCUSATE SODIUM 2 TABLET: 8.6; 5 TABLET, FILM COATED ORAL at 21:19

## 2017-01-01 RX ADMIN — NICARDIPINE HYDROCHLORIDE: 0.1 INJECTION, SOLUTION INTRAVENOUS at 06:02

## 2017-01-01 RX ADMIN — INSULIN LISPRO 2 UNITS: 100 INJECTION, SOLUTION INTRAVENOUS; SUBCUTANEOUS at 17:03

## 2017-01-01 RX ADMIN — SODIUM CHLORIDE 40 MG: 9 INJECTION INTRAMUSCULAR; INTRAVENOUS; SUBCUTANEOUS at 08:26

## 2017-01-01 RX ADMIN — CHLORHEXIDINE GLUCONATE 15 ML: 1.2 RINSE ORAL at 23:03

## 2017-01-01 RX ADMIN — LABETALOL HYDROCHLORIDE 10 MG: 5 INJECTION, SOLUTION INTRAVENOUS at 06:31

## 2017-01-01 RX ADMIN — LABETALOL HYDROCHLORIDE 10 MG: 5 INJECTION, SOLUTION INTRAVENOUS at 22:38

## 2017-01-01 RX ADMIN — AMLODIPINE BESYLATE 10 MG: 10 TABLET ORAL at 10:17

## 2017-01-01 RX ADMIN — POTASSIUM CHLORIDE 10 MEQ: 7.46 INJECTION, SOLUTION INTRAVENOUS at 10:22

## 2017-01-01 RX ADMIN — INSULIN LISPRO 2 UNITS: 100 INJECTION, SOLUTION INTRAVENOUS; SUBCUTANEOUS at 00:50

## 2017-01-01 RX ADMIN — CHLORHEXIDINE GLUCONATE 15 ML: 1.2 RINSE ORAL at 20:41

## 2017-01-01 RX ADMIN — CHLORHEXIDINE GLUCONATE 15 ML: 1.2 RINSE ORAL at 09:31

## 2017-01-01 RX ADMIN — INSULIN LISPRO 2 UNITS: 100 INJECTION, SOLUTION INTRAVENOUS; SUBCUTANEOUS at 18:00

## 2017-01-01 RX ADMIN — POTASSIUM CHLORIDE 10 MEQ: 7.46 INJECTION, SOLUTION INTRAVENOUS at 12:00

## 2017-01-01 RX ADMIN — CHLORHEXIDINE GLUCONATE 15 ML: 1.2 RINSE ORAL at 19:50

## 2017-01-01 RX ADMIN — DEXTROSE MONOHYDRATE, SODIUM CHLORIDE, AND POTASSIUM CHLORIDE 37.4 ML/HR: 50; 9; 1.49 INJECTION, SOLUTION INTRAVENOUS at 06:20

## 2017-01-01 RX ADMIN — CHLORHEXIDINE GLUCONATE 15 ML: 1.2 RINSE ORAL at 08:27

## 2017-01-01 RX ADMIN — POTASSIUM CHLORIDE 10 MEQ: 1.5 POWDER, FOR SOLUTION ORAL at 00:13

## 2017-01-01 RX ADMIN — LEVOFLOXACIN 500 MG: 5 INJECTION, SOLUTION INTRAVENOUS at 17:21

## 2017-01-01 RX ADMIN — FOLIC ACID: 5 INJECTION, SOLUTION INTRAMUSCULAR; INTRAVENOUS; SUBCUTANEOUS at 17:00

## 2017-01-01 RX ADMIN — NICARDIPINE HYDROCHLORIDE 15 MG/HR: 2.5 INJECTION INTRAVENOUS at 08:55

## 2017-01-01 RX ADMIN — NICARDIPINE HYDROCHLORIDE 10 MG/HR: 2.5 INJECTION INTRAVENOUS at 14:14

## 2017-01-01 RX ADMIN — DEXTROSE MONOHYDRATE, SODIUM CHLORIDE, AND POTASSIUM CHLORIDE 37 ML/HR: 50; 9; 1.49 INJECTION, SOLUTION INTRAVENOUS at 17:25

## 2017-01-01 RX ADMIN — LABETALOL HYDROCHLORIDE 10 MG: 5 INJECTION, SOLUTION INTRAVENOUS at 11:30

## 2017-01-01 RX ADMIN — STANDARDIZED SENNA CONCENTRATE AND DOCUSATE SODIUM 2 TABLET: 8.6; 5 TABLET, FILM COATED ORAL at 23:03

## 2017-01-01 RX ADMIN — MAGNESIUM SULFATE HEPTAHYDRATE 1 G: 1 INJECTION, SOLUTION INTRAVENOUS at 06:12

## 2017-01-01 RX ADMIN — METOPROLOL TARTRATE 12.5 MG: 25 TABLET ORAL at 11:16

## 2017-01-01 RX ADMIN — LORAZEPAM 0.5 MG: 2 INJECTION INTRAMUSCULAR at 20:26

## 2017-01-01 RX ADMIN — DEXTROSE MONOHYDRATE AND SODIUM CHLORIDE 37 ML/HR: 5; .9 INJECTION, SOLUTION INTRAVENOUS at 05:12

## 2017-01-01 RX ADMIN — METOPROLOL TARTRATE 12.5 MG: 25 TABLET ORAL at 21:46

## 2017-01-01 RX ADMIN — INSULIN LISPRO 2 UNITS: 100 INJECTION, SOLUTION INTRAVENOUS; SUBCUTANEOUS at 18:55

## 2017-01-01 RX ADMIN — CHLORHEXIDINE GLUCONATE 15 ML: 1.2 RINSE ORAL at 10:37

## 2017-01-01 RX ADMIN — SODIUM CHLORIDE 40 MG: 9 INJECTION INTRAMUSCULAR; INTRAVENOUS; SUBCUTANEOUS at 08:47

## 2017-01-01 RX ADMIN — NICARDIPINE HYDROCHLORIDE 5 MG/HR: 2.5 INJECTION INTRAVENOUS at 16:08

## 2017-01-01 RX ADMIN — LABETALOL HYDROCHLORIDE 10 MG: 5 INJECTION, SOLUTION INTRAVENOUS at 21:00

## 2017-01-01 RX ADMIN — SODIUM CHLORIDE 40 MG: 9 INJECTION INTRAMUSCULAR; INTRAVENOUS; SUBCUTANEOUS at 21:46

## 2017-01-01 RX ADMIN — NICARDIPINE HYDROCHLORIDE 5 MG/HR: 2.5 INJECTION INTRAVENOUS at 22:21

## 2017-01-01 RX ADMIN — INSULIN LISPRO 9 UNITS: 100 INJECTION, SOLUTION INTRAVENOUS; SUBCUTANEOUS at 00:33

## 2017-01-01 RX ADMIN — SODIUM CHLORIDE 40 MG: 9 INJECTION INTRAMUSCULAR; INTRAVENOUS; SUBCUTANEOUS at 20:41

## 2017-01-01 RX ADMIN — INSULIN LISPRO 2 UNITS: 100 INJECTION, SOLUTION INTRAVENOUS; SUBCUTANEOUS at 08:53

## 2017-01-01 RX ADMIN — CHLORHEXIDINE GLUCONATE 15 ML: 1.2 RINSE ORAL at 10:13

## 2017-01-01 RX ADMIN — POTASSIUM CHLORIDE 10 MEQ: 7.46 INJECTION, SOLUTION INTRAVENOUS at 12:05

## 2017-01-01 RX ADMIN — PROPOFOL 10 MCG/KG/MIN: 10 INJECTION, EMULSION INTRAVENOUS at 19:39

## 2017-01-01 RX ADMIN — FOLIC ACID: 5 INJECTION, SOLUTION INTRAMUSCULAR; INTRAVENOUS; SUBCUTANEOUS at 15:01

## 2017-01-01 RX ADMIN — LABETALOL HYDROCHLORIDE 10 MG: 5 INJECTION, SOLUTION INTRAVENOUS at 17:54

## 2017-01-01 RX ADMIN — FOLIC ACID: 5 INJECTION, SOLUTION INTRAMUSCULAR; INTRAVENOUS; SUBCUTANEOUS at 18:02

## 2017-01-01 RX ADMIN — NICARDIPINE HYDROCHLORIDE 15 MG/HR: 2.5 INJECTION INTRAVENOUS at 04:00

## 2017-01-01 RX ADMIN — CHLORHEXIDINE GLUCONATE 15 ML: 1.2 RINSE ORAL at 20:20

## 2017-01-01 RX ADMIN — LORAZEPAM 0.5 MG: 2 INJECTION INTRAMUSCULAR at 19:58

## 2017-01-01 RX ADMIN — NICARDIPINE HYDROCHLORIDE 10 MG/HR: 2.5 INJECTION INTRAVENOUS at 22:14

## 2017-01-01 RX ADMIN — INSULIN LISPRO 9 UNITS: 100 INJECTION, SOLUTION INTRAVENOUS; SUBCUTANEOUS at 00:00

## 2017-01-01 RX ADMIN — CHLORHEXIDINE GLUCONATE 15 ML: 1.2 RINSE ORAL at 08:30

## 2017-01-01 RX ADMIN — INSULIN LISPRO 3 UNITS: 100 INJECTION, SOLUTION INTRAVENOUS; SUBCUTANEOUS at 18:36

## 2017-01-01 RX ADMIN — AMLODIPINE BESYLATE 10 MG: 10 TABLET ORAL at 14:30

## 2017-01-01 RX ADMIN — POTASSIUM CHLORIDE 20 MEQ: 1.5 POWDER, FOR SOLUTION ORAL at 20:41

## 2017-01-01 RX ADMIN — FENTANYL CITRATE 50 MCG: 50 INJECTION, SOLUTION INTRAMUSCULAR; INTRAVENOUS at 02:29

## 2017-01-01 RX ADMIN — LORAZEPAM 0.5 MG: 2 INJECTION INTRAMUSCULAR at 19:27

## 2017-01-01 RX ADMIN — METOPROLOL TARTRATE 12.5 MG: 25 TABLET ORAL at 21:51

## 2017-01-01 RX ADMIN — FENTANYL CITRATE 50 MCG: 50 INJECTION, SOLUTION INTRAMUSCULAR; INTRAVENOUS at 20:38

## 2017-01-01 RX ADMIN — DEXTROSE MONOHYDRATE AND SODIUM CHLORIDE 0.75 ML/KG/HR: 5; .9 INJECTION, SOLUTION INTRAVENOUS at 23:09

## 2017-01-01 RX ADMIN — FOLIC ACID: 5 INJECTION, SOLUTION INTRAMUSCULAR; INTRAVENOUS; SUBCUTANEOUS at 17:03

## 2017-01-01 RX ADMIN — LABETALOL HYDROCHLORIDE 10 MG: 5 INJECTION, SOLUTION INTRAVENOUS at 09:57

## 2017-01-01 RX ADMIN — NICARDIPINE HYDROCHLORIDE 5 MG/HR: 2.5 INJECTION INTRAVENOUS at 19:49

## 2017-01-01 RX ADMIN — INSULIN LISPRO 6 UNITS: 100 INJECTION, SOLUTION INTRAVENOUS; SUBCUTANEOUS at 17:23

## 2017-01-01 RX ADMIN — STANDARDIZED SENNA CONCENTRATE AND DOCUSATE SODIUM 2 TABLET: 8.6; 5 TABLET, FILM COATED ORAL at 22:00

## 2017-01-01 RX ADMIN — POTASSIUM PHOSPHATE, MONOBASIC AND POTASSIUM PHOSPHATE, DIBASIC: 224; 236 INJECTION, SOLUTION INTRAVENOUS at 12:08

## 2017-01-01 RX ADMIN — POTASSIUM & SODIUM PHOSPHATES POWDER PACK 280-160-250 MG 2 PACKET: 280-160-250 PACK at 10:24

## 2017-01-01 RX ADMIN — INSULIN DETEMIR 7 UNITS: 100 INJECTION, SOLUTION SUBCUTANEOUS at 12:06

## 2017-01-01 RX ADMIN — AMLODIPINE BESYLATE 10 MG: 10 TABLET ORAL at 08:29

## 2017-01-01 RX ADMIN — INSULIN LISPRO 4 UNITS: 100 INJECTION, SOLUTION INTRAVENOUS; SUBCUTANEOUS at 12:08

## 2017-01-01 RX ADMIN — INSULIN LISPRO 2 UNITS: 100 INJECTION, SOLUTION INTRAVENOUS; SUBCUTANEOUS at 00:55

## 2017-01-01 RX ADMIN — INSULIN DETEMIR 5 UNITS: 100 INJECTION, SOLUTION SUBCUTANEOUS at 12:41

## 2017-01-01 RX ADMIN — AMLODIPINE BESYLATE 10 MG: 10 TABLET ORAL at 08:47

## 2017-01-01 RX ADMIN — NICARDIPINE HYDROCHLORIDE 10 MG/HR: 2.5 INJECTION INTRAVENOUS at 19:42

## 2017-01-01 RX ADMIN — CHLORHEXIDINE GLUCONATE 15 ML: 1.2 RINSE ORAL at 08:44

## 2017-01-01 RX ADMIN — NICARDIPINE HYDROCHLORIDE 2.5 MG/HR: 2.5 INJECTION INTRAVENOUS at 23:50

## 2017-01-01 RX ADMIN — POTASSIUM & SODIUM PHOSPHATES POWDER PACK 280-160-250 MG 2 PACKET: 280-160-250 PACK at 12:19

## 2017-01-01 RX ADMIN — SODIUM CHLORIDE 40 MG: 9 INJECTION INTRAMUSCULAR; INTRAVENOUS; SUBCUTANEOUS at 08:30

## 2017-01-01 RX ADMIN — MEROPENEM 1 G: 1 INJECTION, POWDER, FOR SOLUTION INTRAVENOUS at 08:25

## 2017-01-01 RX ADMIN — SODIUM CHLORIDE 40 MG: 9 INJECTION INTRAMUSCULAR; INTRAVENOUS; SUBCUTANEOUS at 09:31

## 2017-01-01 RX ADMIN — CHLORHEXIDINE GLUCONATE 15 ML: 1.2 RINSE ORAL at 21:21

## 2017-02-16 NOTE — PROGRESS NOTES
Jojo Teran is a 80 y.o. female presented to clinic for a f/u. Pt has elevated BP. 1. Have you been to the ER, urgent care clinic since your last visit? Hospitalized since your last visit? No    2. Have you seen or consulted any other health care providers outside of the [address] [place] [address] since your last visit? Include any pap smears or colon screening.  No

## 2017-02-16 NOTE — PROGRESS NOTES
Manan Pacheco is a 80 y.o.  female and presents with    Chief Complaint   Patient presents with    Other     colon ca    Other     pagets    Thyroid Problem    Anemia    Hypertension    Chronic Kidney Disease     1. Very unhappy with colostomy care  Wants eferral to [facility] clinic    2. Bony growth on back causing a lot of apin        Subjective: Thyroid Review:  Patient is seen for followup of hypothyroidism. Thyroid ROS: denies fatigue, weight changes, heat/cold intolerance, bowel/skin changes or CVS symptoms. Osteoarthritis and Chronic Pain:  Patient has osteoarthritis, primarily affecting the diffuse. Symptoms onset: problem is longstanding. Rheumatological ROS: no current joint or muscle symptoms, essentially pain-free. Response to treatment plan: stable. 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  ferrous sulfate (FEOSOL) 325 mg (65 mg iron) tablet Take 325 mg by mouth daily.  Carboxymethylcellulose-Glycern (REFRESH OPTIVE) 0.5-0.9 % drop Apply 1 Drop to eye daily as needed (Dry Eye).  PROPYLENE GLYCOL/ (SYSTANE ULTRA OP) Apply 1 Drop to eye daily as needed (Dry Eye).       Cholecalciferol, Vitamin D3, (VITAMIN D3) 2,000 unit cap capsule Take 2,000 Units by mouth daily.  Famotidine-Ca Carb-Mag Hydrox (PEPCID COMPLETE) -165 mg chew Take 1 Tab by mouth daily as needed (Heartburn).  enalapril (VASOTEC) 5 mg tablet TAKE 1 TABLET BY MOUTH EVERY DAY 90 Tab 4    levothyroxine (SYNTHROID) 75 mcg tablet Take 1 Tab by mouth daily. 90 Tab 4    calcium-vitamin D (OYSTER SHELL) 500 mg(1,250mg) -200 unit per tablet Take 1 Tab by mouth three (3) times daily (with meals). Indications: OSTEOPOROSIS 90 Tab 1    latanoprost (XALATAN) 0.005 % ophthalmic solution Administer 1 Drop to both eyes nightly. 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    Thyroid disease      Past Surgical History   Procedure Laterality Date    Endoscopy, colon, diagnostic  4/8/2010    Hx gi  8/30/2010     Abdominoperineal Resection - Rectal cancer    Hx colostomy  8/30/2010    Hx polypectomy  4/8/2010    Hx hysterectomy       Family History   Problem Relation Age of Onset    Heart Attack Father     Diabetes Mother      Social History   Substance Use Topics    Smoking status: Former Smoker     Packs/day: 0.25     Years: 10.00     Quit date: 1992    Smokeless tobacco: Never Used    Alcohol use No       ROS       All other systems reviewed and are negative.       Objective:  Vitals:    02/16/17 1458   BP: (!) 178/94   Pulse: 61   Resp: 14   Temp: 96 °F (35.6 °C)   TempSrc: Oral   SpO2: 100%   Weight: 94 lb (42.6 kg)   Height: 5' 4\" (1.626 m)   PainSc:   0 - No pain                 alert, well appearing, and in no distress, oriented to person, place, and time and normal appearing weight  Chest - clear to auscultation, no wheezes, rales or rhonchi, symmetric air entry  Heart - normal rate, regular rhythm, normal S1, S2, no murmurs, rubs, clicks or gallops  Abdomen - soft, nontender, nondistended, no masses or organomegaly        LABS     TESTS      Assessment/Plan:    Colon ca stable  Pt having trouble with ostomy care wants referral to ostomy clinic at [facility] will do requested referall  pageilana stable  Thyroid stable  Anemia check labs   htn stable  Renal failure needs f/u       Lab review: labs are reviewed, up to date and normal      I have discussed the diagnosis with the patient and the intended plan as seen in the above orders. The patient has received an after-visit summary and questions were answered concerning future plans. I have discussed medication side effects and warnings with the patient as well. I have reviewed the plan of care with the patient, accepted their input and they are in agreement with the treatment goals.      Follow-up Disposition: Not on File

## 2017-02-16 NOTE — MR AVS SNAPSHOT
Visit Information Date & Time Provider Department Dept. Phone Encounter #  
 2/16/2017  2:30 PM Kurtis Britton [phone] [id number] Follow-up Instructions Return in about 4 weeks (around 3/16/2017) for already scheduled, physical.  
  
Your Appointments 3/21/2017 12:30 PM  
[place] [address] [address] [place] [address]) Appt Note: Return in about 4 months (around 3/17/2017) for physical, EKG next visit, labs at next visit. [facility] [address] [facility] [address] [facility] [place] [address] [place] [address] [address] Upcoming Health Maintenance Date Due INFLUENZA AGE 9 TO ADULT 8/1/2016 MEDICARE YEARLY EXAM 3/18/2017 GLAUCOMA SCREENING Q2Y 1/26/2019 DTaP/Tdap/Td series (2 - Td) 6/7/2021 Allergies as of 2/16/2017  Review Complete On: 12/16/2016 By: Pebbles Mitchell MD  
  
 Severity Noted Reaction Type Reactions Penicillins    Not Reported This Time Current Immunizations  Reviewed on 6/9/2016 Name Date Influenza High Dose Vaccine PF 12/17/2015 Influenza Vaccine ([place] Canine Kidney) PF 12/2/2014 10:45 AM  
 Influenza Vaccine Split 12/4/2012, 11/7/2011 Pneumococcal Conjugate (PCV-13) 12/17/2015 Pneumococcal Vaccine (Unspecified Type) 6/7/2011 TDAP Vaccine 6/7/2011 Not reviewed this visit You Were Diagnosed With   
  
 Codes Comments 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[facility])    -  Primary ICD-10-CM: Z93.3 ICD-9-CM: V44.3 Hip fracture, right, closed, initial encounter [facility])     ICD-10-CM: S72.001A ICD-9-CM: 820.8 ACP (advance care planning)     ICD-10-CM: Z71.89 ICD-9-CM: V65.49 Vitamin D insufficiency     ICD-10-CM: E55.9 ICD-9-CM: 268.9 History of rectal cancer     ICD-10-CM: Z85.048 ICD-9-CM: V10.06 Paget's bone disease     ICD-10-CM: M88.9 ICD-9-CM: 731.0 Subclinical iodine-deficiency hypothyroidism     ICD-10-CM: E02 ICD-9-CM: 244.8 Iron deficiency anemia due to chronic blood loss     ICD-10-CM: D50.0 ICD-9-CM: 280.0 Essential hypertension     ICD-10-CM: I10 
ICD-9-CM: 401.9 Vitals BP Pulse Temp Resp Height(growth percentile) Weight(growth percentile) (!) 178/94 (BP 1 Location: Right arm, BP Patient Position: Sitting) 61 96 °F (35.6 °C) (Oral) 14 5' 4\" (1.626 m) 94 lb (42.6 kg) SpO2 BMI OB Status Smoking Status 100% 16.14 kg/m2 Hysterectomy Former Smoker BMI and BSA Data Body Mass Index Body Surface Area  
 16.14 kg/m 2 1.39 m 2 Preferred Pharmacy Pharmacy Name Phone [facility], [address] [phone] Your Updated Medication List  
  
   
This list is accurate as of: 2/16/17  3:14 PM.  Always use your most recent med list.  
  
  
  
  
 brimonidine-timolol 0.2-0.5 % Drop ophthalmic solution Commonly known as:  COMBIGAN Administer 1 Drop to both eyes daily. calcium-vitamin D 500 mg(1,250mg) -200 unit per tablet Commonly known as:  OYSTER SHELL Take 1 Tab by mouth three (3) times daily (with meals). Indications: OSTEOPOROSIS  
  
 * enalapril 5 mg tablet Commonly known as:  VASOTEC  
TAKE 1 TABLET BY MOUTH EVERY DAY  
  
 * enalapril 5 mg tablet Commonly known as:  VASOTEC  
TAKE 1 TABLET BY MOUTH EVERY DAY  
  
 FEOSOL 325 mg (65 mg iron) tablet Generic drug:  ferrous sulfate Take 325 mg by mouth daily. latanoprost 0.005 % ophthalmic solution Commonly known as:  Paralee Dicker Administer 1 Drop to both eyes nightly. levothyroxine 75 mcg tablet Commonly known as:  SYNTHROID Take 1 Tab by mouth daily. PEPCID COMPLETE -165 mg [facility] Generic drug:  Famotidine-Ca Carb-Mag Hydrox Take 1 Tab by mouth daily as needed (Heartburn). REFRESH OPTIVE 0.5-0.9 % Drop Generic drug:  Carboxymethylcellulose-Glycern Apply 1 Drop to eye daily as needed (Dry Eye). SYSTANE ULTRA OP Apply 1 Drop to eye daily as needed (Dry Eye). VITAMIN D3 2,000 unit Cap capsule Generic drug:  Cholecalciferol (Vitamin D3) Take 2,000 Units by mouth daily. * Notice: This list has 2 medication(s) that are the same as other medications prescribed for you. Read the directions carefully, and ask your doctor or other care provider to review them with you. We Performed the Following REFERRAL TO WOUND CARE [QAF376 Custom] Comments:  
 Please evaluate patient for ostomy clinic at [facility]. Follow-up Instructions Return in about 4 weeks (around 3/16/2017) for already scheduled, physical.  
  
To-Do List   
 02/16/2017 Lab:  CBC WITH AUTOMATED DIFF   
  
 02/16/2017 Lab:  FERRITIN   
  
 02/16/2017 Lab:  LIPID PANEL   
  
 02/16/2017 Lab:  METABOLIC PANEL, COMPREHENSIVE   
  
 02/16/2017 Lab:  T4, FREE   
  
 02/16/2017 Lab:  TSH 3RD GENERATION   
  
 02/16/2017 Lab:  URINALYSIS W/ RFLX MICROSCOPIC Referral Information Referral ID Referred By Referred To  
  
 [id number] Leslie WEINER Not Available Visits Status Start Date End Date 1 New Request 2/16/17 2/16/18 If your referral has a status of pending review or denied, additional information will be sent to support the outcome of this decision. Introducing [facility] & HEALTH SERVICES! Dear Abhishek Marcos: 
Thank you for requesting a [employer] account. Our records indicate that you already have an active [employer] account. You can access your account anytime at [email or web address]. [email or web address] Did you know that you can access your hospital and ER discharge instructions at any time in [employer]? You can also review all of your test results from your hospital stay or ER visit. Additional Information If you have questions, please visit the Frequently Asked Questions section of the [employer] website at [email or web address] Remember, [employer] is NOT to be used for urgent needs. For medical emergencies, dial 911. Now available from your iPhone and Android! Please provide this summary of care documentation to your next provider. Your primary care clinician is listed as [id number] [facility]. If you have any questions after today's visit, please call [phone].

## 2017-03-21 NOTE — MR AVS SNAPSHOT
Visit Information Date & Time Provider Department Dept. Phone Encounter #  
 3/21/2017 12:30 PM Mariia Le [phone] [id number] Follow-up Instructions Return in about 3 months (around 6/21/2017) for EOV. Upcoming Health Maintenance Date Due INFLUENZA AGE 9 TO ADULT 8/1/2016 MEDICARE YEARLY EXAM 3/18/2017 GLAUCOMA SCREENING Q2Y 1/26/2019 DTaP/Tdap/Td series (2 - Td) 6/7/2021 Allergies as of 3/21/2017  Review Complete On: 3/21/2017 By: Iris Friedman LPN Severity Noted Reaction Type Reactions Penicillins    Not Reported This Time Current Immunizations  Reviewed on 6/9/2016 Name Date Influenza High Dose Vaccine PF 12/17/2015 Influenza Vaccine ([place] Canine Kidney) PF 12/2/2014 10:45 AM  
 Influenza Vaccine Split 12/4/2012, 11/7/2011 Pneumococcal Conjugate (PCV-13) 12/17/2015 Pneumococcal Vaccine (Unspecified Type) 6/7/2011 TDAP Vaccine 6/7/2011 Not reviewed this visit You Were Diagnosed With   
  
 Codes Comments Routine general medical examination at a health care facility    -  Primary ICD-10-CM: Z00.00 ICD-9-CM: V70.0 Essential hypertension     ICD-10-CM: I10 
ICD-9-CM: 401.9 Congenital hypothyroidism without goiter     ICD-10-CM: E03.1 ICD-9-CM: 681 Vitals BP Pulse Temp Resp Height(growth percentile) Weight(growth percentile) 172/76 (BP 1 Location: Right arm, BP Patient Position: Sitting) 70 97.3 °F (36.3 °C) (Oral) 18 5' 4\" (1.626 m) 92 lb 6.4 oz (41.9 kg) SpO2 BMI OB Status Smoking Status 98% 15.86 kg/m2 Hysterectomy Former Smoker BMI and BSA Data Body Mass Index Body Surface Area  
 15.86 kg/m 2 1.38 m 2 Preferred Pharmacy Pharmacy Name Phone [facility], [address] [phone] Your Updated Medication List  
  
   
 This list is accurate as of: 3/21/17  1:59 PM.  Always use your most recent med list.  
  
  
  
  
 brimonidine-timolol 0.2-0.5 % Drop ophthalmic solution Commonly known as:  COMBIGAN Administer 1 Drop to both eyes daily. calcium-vitamin D 500 mg(1,250mg) -200 unit per tablet Commonly known as:  OYSTER SHELL Take 1 Tab by mouth three (3) times daily (with meals). Indications: OSTEOPOROSIS  
  
 enalapril 5 mg tablet Commonly known as:  VASOTEC  
TAKE 1 TABLET BY MOUTH EVERY DAY  
  
 latanoprost 0.005 % ophthalmic solution Commonly known as:  Kenith Standing Administer 1 Drop to both eyes nightly. levothyroxine 75 mcg tablet Commonly known as:  SYNTHROID Take 1 Tab by mouth daily. Prescriptions Sent to Pharmacy Refills  
 enalapril (VASOTEC) 5 mg tablet 4 Sig: TAKE 1 TABLET BY MOUTH EVERY DAY Class: Normal  
 Pharmacy: [facility] [address] [place] [address], [address] [facility] AT [address] [place] [address] Ph #: [phone]  
 levothyroxine (SYNTHROID) 75 mcg tablet 4 Sig: Take 1 Tab by mouth daily. Class: Normal  
 Pharmacy: [facility] [address] [place] [address], [address] Ph #: [phone] Route: Oral  
  
We Performed the Following AMB POC EKG ROUTINE W/ 12 LEADS, INTER & REP [66893 CPT(R)] Comments:  
 Verbal order with read back per Dr. Abdon Meehan request  
  
Follow-up Instructions Return in about 3 months (around 6/21/2017) for EOV. Introducing [facility] & HEALTH SERVICES! Helgar Janene Khan: 
Thank you for requesting a [employer] account. Our records indicate that you already have an active [employer] account. You can access your account anytime at [email or web address] Did you know that you can access your hospital and ER discharge instructions at any time in [employer]? You can also review all of your test results from your hospital stay or ER visit. Additional Information If you have questions, please visit the Frequently Asked Questions section of the [employer] website at [email or web address] Remember, [employer] is NOT to be used for urgent needs. For medical emergencies, dial 911. Now available from your iPhone and Android! Please provide this summary of care documentation to your next provider. Your primary care clinician is listed as [address] [place] [address]. If you have any questions after today's visit, please call [phone].

## 2017-03-21 NOTE — PROGRESS NOTES
THE SUBSEQUENT MEDICARE ANNUAL WELLNESS VISIT PROGRESS NOTES    This is a Subsequent Medicare Annual Wellness Visit providing Personalized Prevention Plan Services (PPPS) (Performed 12 months after initial AWV and PPPS )    I have reviewed the patient's medical history in detail and updated the computerized patient record. Cholo Rios is a 80 y.o.  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 90 Tab 4    brimonidine-timolol (COMBIGAN) 0.2-0.5 % drop ophthalmic solution Administer 1 Drop to both eyes daily.  calcium-vitamin D (OYSTER SHELL) 500 mg(1,250mg) -200 unit per tablet Take 1 Tab by mouth three (3) times daily (with meals). Indications: OSTEOPOROSIS 90 Tab 1    latanoprost (XALATAN) 0.005 % ophthalmic solution Administer 1 Drop to both eyes nightly.        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    Thyroid disease      Past Surgical History:   Procedure Laterality Date    ENDOSCOPY, COLON, DIAGNOSTIC  4/8/2010    HX COLOSTOMY  8/30/2010    HX GI  8/30/2010    Abdominoperineal Resection - Rectal cancer    HX HYSTERECTOMY      HX POLYPECTOMY  4/8/2010     Family History   Problem Relation Age of Onset    Heart Attack Father     Diabetes Mother      Social History   Substance Use Topics    Smoking status: Former Smoker     Packs/day: 0.25     Years: 10.00     Quit date: 1992    Smokeless tobacco: Never Used    Alcohol use No         ROS       All other systems reviewed and are negative. 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 90 Tab 4    brimonidine-timolol (COMBIGAN) 0.2-0.5 % drop ophthalmic solution Administer 1 Drop to both eyes daily.  calcium-vitamin D (OYSTER SHELL) 500 mg(1,250mg) -200 unit per tablet Take 1 Tab by mouth three (3) times daily (with meals). Indications: OSTEOPOROSIS 90 Tab 1    latanoprost (XALATAN) 0.005 % ophthalmic solution Administer 1 Drop to both eyes nightly.        Allergies   Allergen Reactions    Penicillins Not Reported This Time     Family History   Problem Relation Age of Onset    Heart Attack Father     Diabetes Mother      Social History   Substance Use Topics    Smoking status: Former Smoker     Packs/day: 0.25     Years: 10.00     Quit date: 1992    Smokeless tobacco: Never Used    Alcohol use No     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    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 Z93.3    CKD (chronic kidney disease) stage 3, GFR 30-59 ml/min N18.3    ACP (advance care planning) Z71.89       Health Maintenance History  Immunizations reviewed, dtap utd  , pneumovax utd , flu ud , zoster utd   Colonoscopy: utd ,   Chest CT : ,  Eye exam: utd   [facility] Directive or Living Will: yes    Depression Risk Factor Screening:      Patient Health Questionnaire (PHQ-2)   Over the last 2 weeks, how often have you been bothered by any of the following problems? · Little interest or pleasure in doing things? · Not at all. [0]  · Feeling down, depressed, or hopeless? · Not at all. [0]    Total Score: 0/6  PHQ-2 Assessment Scoring:   A score of 2 or more requires further screening with the PHQ-9    Alcohol Risk Factor Screening:     Women: On any occasion during the past 3 months, have you had more than 3 drinks containing alcohol? Do you average more than 7 drinks per week? Men: On any occasion during the past 3 months, have you had more than 4 drinks containing alcohol? Do you average more than 14 drinks per week? Functional Ability and Level of Safety:     Hearing Loss    mild    Activities of Daily Living   Self-care. Requires assistance with: no ADLs    Fall Risk   No fall risk factors    Abuse Screen   None      Examination   Physical Examination  Vitals:    03/21/17 1323   BP: 172/76   Pulse: 70   Resp: 18   Temp: 97.3 °F (36.3 °C)   TempSrc: Oral   SpO2: 98%   Weight: 92 lb 6.4 oz (41.9 kg)   Height: 5' 4\" (1.626 m)   PainSc:   0 - No pain     Body mass index is 15.86 kg/(m^2).     Evaluation of Cognitive Function:  Mood/affect:appropriate Appearance: well groomed   Family member/caregiver input: na     alert, well appearing, and in no distress, oriented to person, place, and time and normal appearing weight    Patient Care Team:  Pat Pandya.DO as PCP - General (Family Practice)  Randa Ponce MD (Hematology and Oncology)  Aaron Wheeler MD (Gastroenterology)  Kathy Scherer MD (Ophthalmology)  Sherif Gunderson MD (Colon and Rectal Surgery)  Brooke Dickson MD (Orthopedic Surgery)    Advice/Referrals/Counseling/Plan:   Education and counseling provided:  Are appropriate based on today's review and evaluation  Include in education list (weight loss, physical activity, smoking cessation, fall prevention, and nutrition)  lab results and schedule of future lab studies reviewed with patient. I have discussed the diagnosis with the patient and the intended plan as seen in the above orders. The patient has received an after-visit summary and questions were answered concerning future plans. I have discussed medication side effects and warnings with the patient as well. I have reviewed the plan of care with the patient, accepted their input and they are in agreement with the treatment goals. Follow-up Disposition:  Return in about 3 months (around 6/21/2017) for EOV.    _____________________________________________________________    Problem Assessment    for treatment of   Chief Complaint   Patient presents with    Hypertension    Thyroid Problem    Other     colon cancer         SUBJECTIVE      Cardiovascular Review:  The patient has hypertension. Diet and Lifestyle: not attempting to follow a low fat, low cholesterol diet, not attempting to follow a low sodium diet  Home BP Monitoring: is not measured at home. Pertinent ROS: taking medications as instructed, no medication side effects noted, no TIA's, no chest pain on exertion, no dyspnea on exertion, no swelling of ankles.    Thyroid Review:  Patient is seen for followup of hypothyroidism. Thyroid ROS: denies fatigue, weight changes, heat/cold intolerance, bowel/skin changes or CVS symptoms. Colon cancer ongoing with colosctomy    Additional Concerns:        Visit Vitals    /76 (BP 1 Location: Right arm, BP Patient Position: Sitting)    Pulse 70    Temp 97.3 °F (36.3 °C) (Oral)    Resp 18    Ht 5' 4\" (1.626 m)    Wt 92 lb 6.4 oz (41.9 kg)    SpO2 98%    BMI 15.86 kg/m2     General:  Alert, cooperative, no distress, appears stated age. Head:  Normocephalic, without obvious abnormality, atraumatic. Eyes:  Conjunctivae/corneas clear. PERRL, EOMs intact. Fundi benign. Ears:  Normal TMs and external ear canals both ears. Nose: Nares normal. Septum midline. Mucosa normal. No drainage or sinus tenderness. Throat: Lips, mucosa, and tongue normal. Teeth and gums normal.   Neck: Supple, symmetrical, trachea midline, no adenopathy, thyroid: no enlargement/tenderness/nodules, no carotid bruit and no JVD. Back:   Symmetric, no curvature. ROM normal. No CVA tenderness. Lungs:   Clear to auscultation bilaterally. Chest wall:  No tenderness or deformity. Heart:  Regular rate and rhythm, S1, S2 normal, no murmur, click, rub or gallop. Breast Exam:  No tenderness, masses, or nipple abnormality. Abdomen:   Soft, non-tender. Bowel sounds normal. No masses,  No organomegaly. Genitalia:  Normal female without lesion, discharge or tenderness. Rectal:  Normal tone,  no masses or tenderness  Guaiac negative stool. Extremities: Extremities normal, atraumatic, no cyanosis or edema. Pulses: 2+ and symmetric all extremities. Skin: Skin color, texture, turgor normal. No rashes or lesions. Lymph nodes: Cervical, supraclavicular, and axillary nodes normal.   Neurologic: CNII-XII intact. Normal strength, sensation and reflexes throughout.            LABS   Component      Latest Ref Rng & Units 2/16/2017 2/16/2017 2/16/2017 2/16/2017           3:34 PM  3:34 PM  3:34 PM 3:34 PM   WBC      4.6 - 13.2 K/uL       RBC      4.20 - 5.30 M/uL       HGB      12.0 - 16.0 g/dL       HCT      35.0 - 45.0 %       MCV      74.0 - 97.0 FL       MCH      24.0 - 34.0 PG       MCHC      31.0 - 37.0 g/dL       RDW      11.6 - 14.5 %       PLATELET      333 - 165 K/uL       MPV      9.2 - 11.8 FL       NEUTROPHILS      40 - 73 %       LYMPHOCYTES      21 - 52 %       MONOCYTES      3 - 10 %       EOSINOPHILS      0 - 5 %       BASOPHILS      0 - 2 %       ABS. NEUTROPHILS      1.8 - 8.0 K/UL       ABS. LYMPHOCYTES      0.9 - 3.6 K/UL       ABS. MONOCYTES      0.05 - 1.2 K/UL       ABS. EOSINOPHILS      0.0 - 0.4 K/UL       ABS. BASOPHILS      0.0 - 0.06 K/UL       DF             Sodium      136 - 145 mmol/L   143    Potassium      3.5 - 5.5 mmol/L   4.0    Chloride      100 - 108 mmol/L   107    CO2      21 - 32 mmol/L   30    Anion gap      3.0 - 18 mmol/L   6    Glucose      74 - 99 mg/dL   89    BUN      7.0 - 18 MG/DL   13    Creatinine      0.6 - 1.3 MG/DL   1.22    BUN/Creatinine ratio      12 - 20     11 (L)    GFR est AA      >60 ml/min/1.73m2   51 (L)    GFR est non-AA      >60 ml/min/1.73m2   42 (L)    Calcium      8.5 - 10.1 MG/DL   9.9    Bilirubin, total      0.2 - 1.0 MG/DL   0.5    ALT      13 - 56 U/L   26    AST      15 - 37 U/L   19    Alk.  phosphatase      45 - 117 U/L   64    Protein, total      6.4 - 8.2 g/dL   7.3    Albumin      3.4 - 5.0 g/dL   3.9    Globulin      2.0 - 4.0 g/dL   3.4    A-G Ratio      0.8 - 1.7     1.1    Color             Appearance             Specific gravity      1.005 - 1.030         pH (UA)      5.0 - 8.0         Protein      NEG mg/dL       Glucose      NEG mg/dL       Ketone      NEG mg/dL       Bilirubin      NEG         Blood      NEG         Urobilinogen      0.2 - 1.0 EU/dL       Nitrites      NEG         Leukocyte Esterase      NEG         Cholesterol, total      <200 MG/DL    187   Triglyceride      <150 MG/DL    168 (H)   HDL Cholesterol 40 - 60 MG/DL    79 (H)   LDL, calculated      0 - 100 MG/DL    74.4   VLDL, calculated      MG/DL    33.6   CHOL/HDL Ratio      0 - 5.0      2.4   TSH      0.36 - 3.74 uIU/mL  0.67     T4, Free      0.7 - 1.5 NG/DL       Ferritin      8 - 388 NG/        Component      Latest Ref Rng & Units 2/16/2017 2/16/2017 2/16/2017           3:34 PM  3:34 PM  3:34 PM   WBC      4.6 - 13.2 K/uL   6.7   RBC      4.20 - 5.30 M/uL   3.88 (L)   HGB      12.0 - 16.0 g/dL   11.9 (L)   HCT      35.0 - 45.0 %   35.6   MCV      74.0 - 97.0 FL   91.8   MCH      24.0 - 34.0 PG   30.7   MCHC      31.0 - 37.0 g/dL   33.4   RDW      11.6 - 14.5 %   14.3   PLATELET      008 - 120 K/uL   186   MPV      9.2 - 11.8 FL   10.9   NEUTROPHILS      40 - 73 %   77 (H)   LYMPHOCYTES      21 - 52 %   15 (L)   MONOCYTES      3 - 10 %   6   EOSINOPHILS      0 - 5 %   2   BASOPHILS      0 - 2 %   0   ABS. NEUTROPHILS      1.8 - 8.0 K/UL   5.2   ABS. LYMPHOCYTES      0.9 - 3.6 K/UL   1.0   ABS. MONOCYTES      0.05 - 1.2 K/UL   0.4   ABS. EOSINOPHILS      0.0 - 0.4 K/UL   0.1   ABS. BASOPHILS      0.0 - 0.06 K/UL   0.0   DF         AUTOMATED   Sodium      136 - 145 mmol/L      Potassium      3.5 - 5.5 mmol/L      Chloride      100 - 108 mmol/L      CO2      21 - 32 mmol/L      Anion gap      3.0 - 18 mmol/L      Glucose      74 - 99 mg/dL      BUN      7.0 - 18 MG/DL      Creatinine      0.6 - 1.3 MG/DL      BUN/Creatinine ratio      12 - 20        GFR est AA      >60 ml/min/1.73m2      GFR est non-AA      >60 ml/min/1.73m2      Calcium      8.5 - 10.1 MG/DL      Bilirubin, total      0.2 - 1.0 MG/DL      ALT      13 - 56 U/L      AST      15 - 37 U/L      Alk.  phosphatase      45 - 117 U/L      Protein, total      6.4 - 8.2 g/dL      Albumin      3.4 - 5.0 g/dL      Globulin      2.0 - 4.0 g/dL      A-G Ratio      0.8 - 1.7        Color       YELLOW     Appearance       CLEAR     Specific gravity      1.005 - 1.030   1.015     pH (UA)      5.0 - 8.0 6.0     Protein      NEG mg/dL 30 (A)     Glucose      NEG mg/dL NEGATIVE     Ketone      NEG mg/dL NEGATIVE     Bilirubin      NEG   NEGATIVE     Blood      NEG   NEGATIVE     Urobilinogen      0.2 - 1.0 EU/dL 0.2     Nitrites      NEG   NEGATIVE     Leukocyte Esterase      NEG   TRACE (A)     Cholesterol, total      <200 MG/DL      Triglyceride      <150 MG/DL      HDL Cholesterol      40 - 60 MG/DL      LDL, calculated      0 - 100 MG/DL      VLDL, calculated      MG/DL      CHOL/HDL Ratio      0 - 5.0        TSH      0.36 - 3.74 uIU/mL      T4, Free      0.7 - 1.5 NG/DL  1.4    Ferritin      8 - 388 NG/ML        TESTS    ekg  nsr      Assessment/Plan:      Hypertension - stable  Thyroid stable  Colon cancer antonia  Wants to see Gabriel Gloria at ostomy clinic in [place]             Lab review: labs are reviewed, up to date and normal

## 2017-05-03 NOTE — PROGRESS NOTES
Nakita Velazquez is a 80 y.o. female presents today for increase signs of demention. Pt is in Room # 6      1. Have you been to the ER, urgent care clinic since your last visit? Hospitalized since your last visit? No    2. Have you seen or consulted any other health care providers outside of the [facility] since your last visit? Include any pap smears or colon screening. Yes When: 3/2017 Where: [facility] Reason for visit: ostomy care and education.

## 2017-05-03 NOTE — PROGRESS NOTES
Gisel Forbes is a 80 y.o.  female and presents with    Chief Complaint   Patient presents with    Decreased Appetite    Chronic Kidney Disease    Hypertension    Anemia    Thyroid Problem    Other     pagets    Dementia           Subjective:  Daughter worried athat she lives alone. Refuses aids, doesn't cook  Not feeling well- losing weight      Cardiovascular Review:  The patient has hypertension and hyperlipidemia. Diet and Lifestyle: not attempting to follow a low fat, low cholesterol diet, not attempting to follow a low sodium diet  Home BP Monitoring: is not measured at home. Pertinent ROS: taking medications as instructed, no medication side effects noted, no TIA's, no chest pain on exertion, no dyspnea on exertion, no swelling of ankles. Thyroid Review:  Patient is seen for followup of hypothyroidism. Thyroid ROS: denies fatigue, weight changes, heat/cold intolerance, bowel/skin changes or CVS symptoms. 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 90 Tab 4    brimonidine-timolol (COMBIGAN) 0.2-0.5 % drop ophthalmic solution Administer 1 Drop to both eyes daily.  calcium-vitamin D (OYSTER SHELL) 500 mg(1,250mg) -200 unit per tablet Take 1 Tab by mouth three (3) times daily (with meals).  Indications: OSTEOPOROSIS 90 Tab 1    latanoprost (XALATAN) 0.005 % ophthalmic solution Administer 1 Drop to both eyes nightly. 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    Thyroid disease      Past Surgical History:   Procedure Laterality Date    ENDOSCOPY, COLON, DIAGNOSTIC  4/8/2010    HX COLOSTOMY  8/30/2010    HX GI  8/30/2010    Abdominoperineal Resection - Rectal cancer    HX HYSTERECTOMY      HX POLYPECTOMY  4/8/2010     Family History   Problem Relation Age of Onset    Heart Attack Father     Diabetes Mother      Social History   Substance Use Topics    Smoking status: Former Smoker     Packs/day: 0.25     Years: 10.00     Quit date: 1992    Smokeless tobacco: Never Used    Alcohol use No       ROS       All other systems reviewed and are negative. Objective:  Vitals:    05/03/17 1112   BP: (!) 164/96   Pulse: 76   Resp: 16   Temp: 97.7 °F (36.5 °C)   TempSrc: Oral   SpO2: 97%   Weight: 90 lb 12.8 oz (41.2 kg)   Height: 5' 4\" (1.626 m)   PainSc:   0 - No pain                 alert, well appearing, and in no distress, oriented to person, place, and time and normal appearing weight  Chest - clear to auscultation, no wheezes, rales or rhonchi, symmetric air entry  Heart - normal rate, regular rhythm, normal S1, S2, no murmurs, rubs, clicks or gallops  Abdomen - soft, nontender, nondistended, no masses or organomegaly        LABS     TESTS      Assessment/Plan:    1. Patient doesn't appear in obvious distress. I have encouraged daughter to talk to senior services. Will also refer to neurology and get basic lab eval for dementia  2. Anemia will check labs   3. Renal failure check labs  4.  Thyroid dz check labs  5. pagets check labs      Lab review: labs are reviewed, up to date and normal      I have discussed the diagnosis with the patient and the intended plan as seen in the above orders. The patient has received an after-visit summary and questions were answered concerning future plans. I have discussed medication side effects and warnings with the patient as well. I have reviewed the plan of care with the patient, accepted their input and they are in agreement with the treatment goals.      Follow-up Disposition: Not on File

## 2017-05-03 NOTE — MR AVS SNAPSHOT
Visit Information Date & Time Provider Department Dept. Phone Encounter #  
 5/3/2017 10:30 AM Jw Balloon., [place]ong [place] [phone] [id number] Follow-up Instructions Return in about 4 weeks (around 5/31/2017) for EOV, labs today. Your Appointments 6/20/2017  1:30 PM  
ROUTINE CARE with Jw Balloon.DO [address] [address] Appt Note: Return in about 3 months (around 6/21/2017) for EOV. [address] [facility] 83 [address] [place]  
  
   
 [address] [facility] Upcoming Health Maintenance Date Due INFLUENZA AGE 9 TO ADULT 8/1/2017 MEDICARE YEARLY EXAM 3/22/2018 GLAUCOMA SCREENING Q2Y 1/26/2019 DTaP/Tdap/Td series (2 - Td) 6/7/2021 Allergies as of 5/3/2017  Review Complete On: 5/3/2017 By: Jw Gordon.,  Severity Noted Reaction Type Reactions Penicillins    Not Reported This Time Current Immunizations  Reviewed on 6/9/2016 Name Date Influenza High Dose Vaccine PF 12/17/2015 Influenza Vaccine ([facility] [place] Canine Kidney) PF 12/2/2014 10:45 AM  
 Influenza Vaccine Split 12/4/2012, 11/7/2011 Pneumococcal Conjugate (PCV-13) 12/17/2015 Pneumococcal Vaccine (Unspecified Type) 6/7/2011 TDAP Vaccine 6/7/2011 Not reviewed this visit You Were Diagnosed With   
  
 Codes Comments Essential hypertension    -  Primary ICD-10-CM: I10 
ICD-9-CM: 401.9 Iron deficiency anemia due to chronic blood loss     ICD-10-CM: D50.0 ICD-9-CM: 280.0 Subclinical iodine-deficiency hypothyroidism     ICD-10-CM: E02 ICD-9-CM: 244.8 Paget's bone disease     ICD-10-CM: M88.9 ICD-9-CM: 731.0 CKD (chronic kidney disease) stage 3, GFR 30-59 ml/min     ICD-10-CM: N18.3 ICD-9-CM: 386. 3 Vitals BP Pulse Temp Resp Height(growth percentile) Weight(growth percentile) (!) 164/96 (BP 1 Location: Left arm, BP Patient Position: Sitting) 76 97.7 °F (36.5 °C) (Oral) 16 5' 4\" (1.626 m) 90 lb 12.8 oz (41.2 kg) SpO2 BMI OB Status Smoking Status 97% 15.59 kg/m2 Hysterectomy Former Smoker BMI and BSA Data Body Mass Index Body Surface Area 15.59 kg/m 2 1.36 m 2 Preferred Pharmacy Pharmacy Name Phone [place], [place] [address] [phone] Your Updated Medication List  
  
   
This list is accurate as of: 5/3/17 11:30 AM.  Always use your most recent med list.  
  
  
  
  
 brimonidine-timolol 0.2-0.5 % Drop ophthalmic solution Commonly known as:  COMBIGAN Administer 1 Drop to both eyes daily. calcium-vitamin D 500 mg(1,250mg) -200 unit per tablet Commonly known as:  OYSTER SHELL Take 1 Tab by mouth three (3) times daily (with meals). Indications: OSTEOPOROSIS  
  
 enalapril 5 mg tablet Commonly known as:  VASOTEC  
TAKE 1 TABLET BY MOUTH EVERY DAY  
  
 latanoprost 0.005 % ophthalmic solution Commonly known as:  Melvena Bennetts Administer 1 Drop to both eyes nightly. levothyroxine 75 mcg tablet Commonly known as:  SYNTHROID Take 1 Tab by mouth daily. We Performed the Following REFERRAL TO NEUROLOGY [PCQ09 Custom] Comments:  
 Please evaluate patient for dementia. 1st available is fine. Follow-up Instructions Return in about 4 weeks (around 5/31/2017) for EOV, labs today. To-Do List   
 05/03/2017 Lab:  CBC WITH AUTOMATED DIFF   
  
 05/03/2017 Microbiology:  CULTURE, URINE   
  
 05/03/2017 Lab:  FERRITIN   
  
 05/03/2017 Lab:  LIPID PANEL   
  
 05/03/2017 Lab:  METABOLIC PANEL, COMPREHENSIVE   
  
 05/03/2017 Lab:  RPR   
  
 05/03/2017 Lab:  SED RATE (ESR)   
  
 05/03/2017 Lab:  T4, FREE   
  
 05/03/2017 Lab:  TSH 3RD GENERATION   
  
 05/03/2017 Lab: URINALYSIS W/ RFLX MICROSCOPIC   
  
 05/03/2017 Lab:  VITAMIN B12 & FOLATE Referral Information Referral ID Referred By Referred To  
  
 [id number] Vicente Ham, [address] [place] [address], MD   
   [address] [place] [address] Liliam Chamberlain 9 Phone: [phone] Fax: [phone] Visits Status Start Date End Date 1 New Request 5/3/17 5/3/18 If your referral has a status of pending review or denied, additional information will be sent to support the outcome of this decision. Introducing [facility]! Dear Marycruz Julian: 
Thank you for requesting a [employer] account. Our records indicate that you already have an active [employer] account. You can access your account anytime at [email or web address] Did you know that you can access your hospital and ER discharge instructions at any time in [employer]? You can also review all of your test results from your hospital stay or ER visit. Additional Information If you have questions, please visit the Frequently Asked Questions section of the [employer] website at [email or web address] Remember, [employer] is NOT to be used for urgent needs. For medical emergencies, dial 911. Now available from your iPhone and Android! Please provide this summary of care documentation to your next provider. Your primary care clinician is listed as [id number] [facility]. If you have any questions after today's visit, please call [phone].

## 2017-05-06 PROBLEM — I61.9 CEREBRAL HEMORRHAGE (HCC): Status: ACTIVE | Noted: 2017-01-01

## 2017-05-06 NOTE — IP AVS SNAPSHOT
[address] [address] 
[phone] Patient: Amber Michel MRN: [id number] KSM:[birth year] You are allergic to the following Allergen Reactions Penicillins Not Reported This Time Recent Documentation Height Weight BMI OB Status Smoking Status 1.626 m 40.4 kg 15.29 kg/m2 Hysterectomy Former Smoker Emergency Contacts Name Discharge Info Relation Home Work Mobile Nataliia Cavanaugh) DISCHARGE CAREGIVER [3] Daughter [21] [phone] Skiseven Kuhn  Child [2] [phone] Clem Kuhn  Child [2] [phone] Nataliia Cavanaugh  Child [2] [phone] About your hospitalization You were admitted on:  May 6, 2017 You last received care in the[address] NEURO MED You were discharged on:  May 18, 2017 Unit phone number:  [phone] Why you were hospitalized Your primary diagnosis was:  Not on File Your diagnoses also included:  Cerebral Hemorrhage (Hcc), Dementia, Vomiting, Altered Mental Status, Intraventricular Hemorrhage, Nontraumatic (Hcc) Providers Seen During Your Hospitalizations Provider Role Specialty Primary office phone Shazia Cruz MD Attending Provider Emergency Medicine [phone] Gregory Dietrich MD Attending Provider [facility] [phone] Lilli Wang MD Attending Provider Internal Medicine [phone] Lamont Caputo DO Attending Provider Internal Medicine [phone] Candance Amor, DO Attending Provider Internal Medicine [phone] Your Primary Care Physician (PCP) Primary Care Physician Office Phone Office Fax Saundra Quinonez [phone] Follow-up Information Follow up With Details Comments Contact Info Vernon Banuelos DO   [address] [facility] [address] [place] [address]/[address] [facility] 83 [address] [phone] Your Appointments Tuesday June 20, 2017  1:30 PM EDT ROUTINE CARE with Isael Contreras.,  [address] [facility] [address] [place] [address]) [id number] [place] [address] [address] [address]  
[phone] Current Discharge Medication List  
  
STOP taking these medications   
 brimonidine-timolol 0.2-0.5 % Drop ophthalmic solution Commonly known as:  COMBIGAN  
   
  
 calcium-vitamin D 500 mg(1,250mg) -200 unit per tablet Commonly known as:  OYSTER SHELL  
   
  
 enalapril 5 mg tablet Commonly known as:  VASOTEC  
   
  
 latanoprost 0.005 % ophthalmic solution Commonly known as:  XALATAN  
   
  
 levothyroxine 75 mcg tablet Commonly known as:  SYNTHROID Discharge Instructions DISCHARGE SUMMARY from Nurse The following personal items are in your possession at time of discharge: 
 
Dental Appliances: None Visual Aid: None Home Medications: None Jewelry: None Clothing: Hat Other Valuables: None PATIENT INSTRUCTIONS: 
 
After general anesthesia or intravenous sedation, for 24 hours or while taking prescription Narcotics: · Limit your activities · Do not drive and operate hazardous machinery · Do not make important personal or business decisions · Do  not drink alcoholic beverages · If you have not urinated within 8 hours after discharge, please contact your surgeon on call. Report the following to your surgeon: 
· Excessive pain, swelling, redness or odor of or around the surgical area · Temperature over 100.5 · Nausea and vomiting lasting longer than 4 hours or if unable to take medications · Any signs of decreased circulation or nerve impairment to extremity: change in color, persistent  numbness, tingling, coldness or increase pain · Any questions What to do at Home: 
Recommended activity: as prescribed by physician If you experience any of the following symptoms as listed in discharge teaching as Donald Gonzalez to call for help\" please follow up with your primary care physician and/or call 911. . 
 
 
*  Please give a list of your current medications to your Primary Care Provider. *  Please update this list whenever your medications are discontinued, doses are 
    changed, or new medications (including over-the-counter products) are added. *  Please carry medication information at all times in case of emergency situations. These are general instructions for a healthy lifestyle: No smoking/ No tobacco products/ Avoid exposure to second hand smoke Surgeon General's Warning:  Quitting smoking now greatly reduces serious risk to your health. Obesity, smoking, and sedentary lifestyle greatly increases your risk for illness A healthy diet, regular physical exercise & weight monitoring are important for maintaining a healthy lifestyle You may be retaining fluid if you have a history of heart failure or if you experience any of the following symptoms:  Weight gain of 3 pounds or more overnight or 5 pounds in a week, increased swelling in our hands or feet or shortness of breath while lying flat in bed. Please call your doctor as soon as you notice any of these symptoms; do not wait until your next office visit. Recognize signs and symptoms of STROKE: 
 
F-face looks uneven A-arms unable to move or move unevenly S-speech slurred or non-existent T-time-call 911 as soon as signs and symptoms begin-DO NOT go Back to bed or wait to see if you get better-TIME IS BRAIN. Warning Signs of HEART ATTACK Call 911 if you have these symptoms: 
? Chest discomfort. Most heart attacks involve discomfort in the center of the chest that lasts more than a few minutes, or that goes away and comes back. It can feel like uncomfortable pressure, squeezing, fullness, or pain. ? Discomfort in other areas of the upper body.  Symptoms can include pain or discomfort in one or both arms, the back, neck, jaw, or stomach. ? Shortness of breath with or without chest discomfort. ? Other signs may include breaking out in a cold sweat, nausea, or lightheadedness. Don't wait more than five minutes to call [address]! Fast action can save your life. Calling 911 is almost always the fastest way to get lifesaving treatment. Emergency Medical Services staff can begin treatment when they arrive  up to an hour sooner than if someone gets to the hospital by car. The discharge information has been reviewed with the patient. The patient verbalized understanding. Discharge medications reviewed with the patient and caregiver and appropriate educational materials and side effects teaching were provided. Learning About a Hemorrhagic Stroke What is a hemorrhagic stroke? When you have a hemorrhagic (say \"xfy-okz-BM-jick\") stroke, it means that a blood vessel in the brain has burst open or has started to leak. When the blood spills into the space inside and around the brain, it damages nearby nerve cells. This is different from an ischemic (say \"apd-PBI-gjzz\") stroke, which happens when a blood clot blocks a blood vessel in the brain. The brain damage from a stroke starts within minutes. Quick treatment can help limit damage to the brain and make recovery more likely. People who have had a stroke may have a hard time talking, understanding things, and making decisions. They may have to relearn daily activities, such as how to eat, bathe, and dress. How well someone recovers from a stroke depends on how quickly the person gets to the hospital, where in the brain the stroke happened, and how severe it was. Stroke rehabilitation, which includes training and therapy, also helps people recover. What are the symptoms? If you have any of these symptoms, call 911 or other emergency services right away. · You have symptoms of a stroke. These may include: 
¨ Sudden numbness, tingling, weakness, or loss of movement in your face, arm, or leg, especially on only one side of your body. ¨ Sudden vision changes. ¨ Sudden trouble speaking. ¨ Sudden confusion or trouble understanding simple statements. ¨ Sudden problems with walking or balance. ¨ A sudden, severe headache that is different from past headaches. See your doctor if you have symptoms that seem like a stroke, even if they go away quickly. You may have had a transient ischemic attack (TIA), sometimes called a mini-stroke. A TIA is a warning that a stroke may happen soon. Getting early treatment for a TIA can help prevent a stroke. What causes a hemorrhagic stroke? A hemorrhagic stroke happens to blood vessels that have been weakened. The most common causes of weakened blood vessels in the brain are: · A brain aneurysm. This is a bulging, weak part of a blood vessel. It can put pressure on nerves, or it can bleed or break open (rupture). · A brain AVM. This is an abnormal knot of weak blood vessels that some people are born with. · Head injury. · Chronic uncontrolled high blood pressure. Blood pressure that is too high over the long term increases your risk for stroke. · Very high blood pressure. Very high blood pressure that comes on suddenly is dangerous. It is a medical emergency. Anticoagulant and antiplatelet medicines can also cause bleeding in the brain. These medicines, also called blood thinners, increase the time it takes for a blood clot to form. How is hemorrhagic stroke treated? Emergency treatment is done to stop the bleeding and prevent damage to the brain. · You may need surgery to repair an aneurysm or to remove the blood that has built up inside the brain. · You may be given medicine to stop the bleeding.  
· You will be closely watched for signs of increased pressure on the brain. These signs include restlessness, confusion, trouble following commands, and headache. · You may take medicine to manage high blood pressure. Ask your doctor if a stroke rehab program is right for you. Rehab increases your chances of getting back some of the abilities you lost. 
Follow-up care is a key part of your treatment and safety. Be sure to make and go to all appointments, and call your doctor if you are having problems. It's also a good idea to know your test results and keep a list of the medicines you take. How can you prevent another stroke? · Work with your doctor to treat health problems, such as high blood pressure, that raise your chances of having another stroke. · Be safe with medicines. Take your medicine exactly as prescribed. Call your doctor if you think you are having a problem with your medicine. · Have a healthy lifestyle. ¨ Do not smoke or allow others to smoke around you. If you need help quitting, talk to your doctor about stop-smoking programs and medicines. These can increase your chances of quitting for good. Smoking makes a stroke more likely. ¨ Limit alcohol to 2 drinks a day for men and 1 drink a day for women. ¨ Be active. Ask your doctor what type and level of activity is safe for you. ¨ Eat heart-healthy foods, like fruits, vegetables, and high-fiber foods. ¨ Stay at a healthy weight. Lose weight if you need to. Where can you learn more? Go to ieCrowd.be Enter R416 in the search box to learn more about \"Learning About a Hemorrhagic Stroke. \"  
© 0100-8795 Healthwise, Incorporated. Care instructions adapted under license by Ashlee Zurita (which disclaims liability or warranty for this information).  This care instruction is for use with your licensed healthcare professional. If you have questions about a medical condition or this instruction, always ask your healthcare professional. Andressa Cantu [employer] disclaims any warranty or liability for your use of this information. Content Version: 63.6.010656; Current as of: August 28, 2015 Discharge Instructions Attachments/References HOSPICE AND PALLIATIVE CARE: GENERAL INFO (ENGLISH) Discharge Orders None [employer] Announcement We are excited to announce that we are making your provider's discharge notes available to you in [employer]. You will see these notes when they are completed and signed by the physician that discharged you from your recent hospital stay. If you have any questions or concerns about any information you see in [employer], please call the Health Information Department where you were seen or reach out to your Primary Care Provider for more information about your plan of care. Introducing [facility]! Dear Kymberly Nixon: 
Thank you for requesting a [employer] account. Our records indicate that you already have an active [employer] account. You can access your account anytime at [email or web address] Did you know that you can access your hospital and ER discharge instructions at any time in [employer]? You can also review all of your test results from your hospital stay or ER visit. Additional Information If you have questions, please visit the Frequently Asked Questions section of the [employer] website at [email or web address] Remember, [employer] is NOT to be used for urgent needs. For medical emergencies, dial 911. Now available from your iPhone and Android! General Information Please provide this summary of care documentation to your next provider. Patient Signature:  ____________________________________________________________ Date:  ____________________________________________________________  
  
Patito Dumont Provider Signature:  ____________________________________________________________ Date:  ____________________________________________________________ More Information Learning About Hospice and Palliative Care What are hospice and palliative care? Palliative (say \"PAL-jeimy-uh-tiv\") care is an area of medicine that helps give you more good days by providing care for quality-of-life issues. It includes treating symptoms like pain, nausea, or sleep problems. It can also include helping you and your loved ones to: · Understand your illness better. · Talk more openly about your feelings. · Decide what treatments you want or don't want. · Communicate better with your doctors, nurses, and each other. Hospice care is a type of palliative care. But it's for people who are near the end of life. What kinds of care are involved? Palliative care: This treatment helps you feel better physically, emotionally, and spiritually while doctors also treat your illness. Your care may include pain relief, counseling, or nutrition advice. Hospice care: Again, the goal of this type of care is to help you feel better. And it can help you get the most out of the time you have left. But you no longer get treatment to try to cure your illness. When does care happen? Palliative care: This care can happen at any time during a serious illness. You don't have to be near death to get this care. Hospice care: In most cases, you can choose hospice care when your doctor believes that you have no more than about 6 months to live. Where does the care happen? Palliative care: This care often happens in hospitals or long-term care facilities like nursing homes. It can take place wherever you are treated, even in your home. Hospice care: Most hospice care is done in the place the patient calls \"home. \" This is often the person's home. But it could also be a place like a nursing home or nursing home center. Hospice care may also be given in hospice centers, hospitals, and other places. Who provides the care? Palliative care: There are doctors and nurses who specialize in this field. But your own doctor may also give some of this care. And there are many other experts who may help you. These include social workers, counselors, therapists, and nutrition experts. Hospice care: In hospitals, hospice centers, and other facilities, care is given by doctors, nurses, and others who are trained in hospice care. In the home, a family member is often the main caregiver. But the family member gets help from care experts. They are on call 24 hours a day. Where can you learn more? Go to http://noreen-adan.info/. Enter 466 8988 in the search box to learn more about \"Learning About Hospice and Palliative Care. \" Current as of: February 24, 2016 Content Version: 11.2 © 6090-2374 [employer]. Care instructions adapted under license by [employer] (which disclaims liability or warranty for this information). If you have questions about a medical condition or this instruction, always ask your healthcare professional. [address] any warranty or liability for your use of this information.

## 2017-05-06 NOTE — ED NOTES
This RN was called to the front to help a patient out of the car. On arrival patient was unresponsive, would move only left hand to sternal rub and covered in vomit. Per family patient was walking around 31 75 62 today and suddenly had right leg weakness. Patient's right arm flaccid. Patient moved out of car and onto stretcher and wheeled into main treatment area, patient began to actively vomit. MD at bedside. Code S level 2 called. Report given to [facility] and family placed into family room and updated on plan of care.

## 2017-05-06 NOTE — ED PROVIDER NOTES
HPI Comments: Patient with a history of colon cancer, hypertension, hypothyroidism brought in by family in their personal vehicle with a sudden onset of left-sided weakness. Last normal was at 6:30, they were walking together when she started drooling on herself. In the car she vomited, no further history can be obtained due to acuity of presentation and her altered mental status    Patient is a 80 y.o. female presenting with altered mental status. 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      Social History Main Topics    Smoking status: Former Smoker     Packs/day: 0.25     Years: 10.00     Quit date: 1992    Smokeless tobacco: Never Used    Alcohol use No    Drug use: No    Sexual activity: Not on file     Other Topics Concern    Not on file     Social History Narrative         ALLERGIES: Penicillins    Review of Systems   Unable to perform ROS: Acuity of condition       Vitals:    05/06/17 1858 05/06/17 1900 05/06/17 1905   BP:   (!) 227/95   Pulse:  75 73   Resp:  21 22   SpO2:  100% 100%   Weight: 49.9 kg (110 lb) Height: 5' 4\" (1.626 m)              Physical Exam   Constitutional:   General:  Thin female covered in vomit not warm to touch  Head:  Normocephalic atraumatic. Eyes:  Pupils asymmetric, LEFT sided is pinpoint, RIGHT 5 mm both are postsurgical, there is RIGHT downward gaze deviation  . Christo Frankel Nose:  No rhinorrhea, inspection grossly normal.    Ears:  Grossly normal to inspection, no discharge. Mouth:  Mucous membranes moist, does fight against a tongue depressor in her airway but she does vomit without any coughing or movement. Neck:  Trachea midline, no asymmetry. Chest:  Grossly normal inspection, symmetric chest rise. Pulmonary:  Clear to auscultation bilaterally no wheezes rhonchi or rales. Cardiovascular:  S1-S2 no murmurs rubs or gallops. Abdomen: LEFT lower quadrant colostomy bag abdomen nondistended. Extremities:  Flaccid in all 4. Neurologic:     Obtunded: Moans to noxious stimulus, eyes do not open to physical stimulus, withdraws to pain. Nursing note reviewed, vital signs reviewed. MDM  Number of Diagnoses or Management Options  Diagnosis management comments: ED course:  Patient pulled from car with altered mental status vomiting and by family's report left-sided weakness since 6:30. Seen immediately upon arrival, fingerstick blood glucose was within normal limits, blood pressure in the 318S systolic, primary concern for acute stroke. Code S    Patient was being undressed, vomited twice unable to protect her own airway, she was turned on her side and did require suction to clear her airway, no  aspiration event in the ED but she was pulling out of the car covered in vomit    There was a advanced directive in the chart, I spoke with the patient's listed primary agent Malachi Breath.   She and the patient's daughter have made the decision to proceed with intubation     Consult:  Discussed care with Lachelle Knowles  Standard discussion; including history of patients chief complaint, available diagnostic results, and treatment course. Procedure note:  Orotracheal Intubation  Consent:  Emergent  Indication:  Respiratory failure airway protection  Performed by:  Myself  Intubation method:  Direct laryngoscopy  Patient status:  Rapid sequence intubation  Preoxygenation:  Nonrebreather mask and BVM  Medications used:   Succinylcholine and etomidate  Sedation:  Propofol  Laryngoscope: Mac 3  Tube size: 7.0 which was unable to pass, easily passed a 6.0  Depth: 23 cm at teeth  Attempts: 1  Postprocedure assessment:  Chest rise equal, end-tidal CO2 and positive color change, breath sounds are equal bilaterally and absent over the epigastrium. Chest x-ray:  ordered  Patient tolerated procedure well  Total time: 5 minutes            ED Course       Procedures        CT appears to have a large intraparenchymal hemorrhage extending into the ventricle with midline shift, neurosurgery was paged    2008 Consult:  Discussed care with Dr. Delon Acuna). Standard discussion; including history of patients chief complaint, available diagnostic results, and treatment course. Per neurosurgery not a surgical candidate, should be made comfort care    Patient's presentation, history, physical exam and laboratory evaluations were reviewed. I felt the patient would benefit from inpatient management and treatment. 2031 Consult:  Discussed care with Dr. Mason Esparza. Standard discussion; including history of patients chief complaint, available diagnostic results, and treatment course. Patient was accepted to their service. Chest x-ray: ET tube in satisfactory position     Consult:  Discussed care with Dr. Bailey Moses. Standard discussion; including history of patients chief complaint, available diagnostic results, and treatment course.       Disposition:    Admitted to ICU      Portions of this chart were created with Dragon medical speech to text program.   Unrecognized errors may be present. I have spent 65 minutes of critical care time (excluding time for other separate services) involved in lab review, consultations with specialist, family decision-making, and documentation. During this entire length of time I was immediately available to the patient. Critical Care: The reason for providing this level of medical care for this critically ill patient was due a critical illness that impaired one or more vital organ systems such that there was a high probability of imminent or life threatening deterioration in the patients condition. This care involved high complexity decision making to assess, manipulate, and support vital system functions, to treat this degree of vital organ system failure and to prevent further life threatening deterioration of the patients condition.

## 2017-05-06 NOTE — IP AVS SNAPSHOT
Current Discharge Medication List  
  
STOP taking these medications   
 brimonidine-timolol 0.2-0.5 % Drop ophthalmic solution Commonly known as:  COMBIGAN  
   
  
 calcium-vitamin D 500 mg(1,250mg) -200 unit per tablet Commonly known as:  OYSTER SHELL  
   
  
 enalapril 5 mg tablet Commonly known as:  VASOTEC  
   
  
 latanoprost 0.005 % ophthalmic solution Commonly known as:  XALATAN  
   
  
 levothyroxine 75 mcg tablet Commonly known as:  SYNTHROID

## 2017-05-07 NOTE — H&P
[address]    Name:  Jose David Ho  MR#:  [id number]  :  1930  Account #:  [de-identified]  Date of Adm:  2017      CHIEF COMPLAINT: Fall, right-sided weakness. HISTORY OF PRESENT ILLNESS: The patient is currently intubated  and unable to provide a history. However, her daughter who is with her  is at the bedside. According to the patient's daughter, they were out  walking today when the patient started complaining about pain or  discomfort in her right lower extremity. At that time, the patient started to  go down, the daughter assisted her down and ultimately was able to  assist her into their car. She reports that she had a vomiting episode. She was also drooling on herself and had verbally diminished. She  was able to respond to the daughter's commands; however, was not  fully verbal during the incident. She was brought to the hospital.    While in the ED, CT scan of her brain showed a very large left-sided  intracranial hemorrhage. The patient was intubated prior to the imaging  to protect her airway and also to allow for testing. The intensivist was  notified. Neurosurgery was also notified. The patient will be admitted to  the ICU for further care.       Past Medical History:   Diagnosis Date    ACP (advance care planning) 2016    Anemia NEC     Colon cancer ([facility] [address] 2010    Colon polyps     Descending colon; Sigmoid colon X2    Diverticulosis     Gastric polyps     GERD (gastroesophageal reflux disease)     Glaucoma 2010    Hypertension 2010    Paget's bone disease 2013    Thyroid disease        Past Surgical History:   Procedure Laterality Date    ENDOSCOPY, COLON, DIAGNOSTIC  2010    HX COLOSTOMY  2010    HX GI  2010    Abdominoperineal Resection - Rectal cancer    HX HYSTERECTOMY      HX POLYPECTOMY  2010       Social History     Social History    Marital status:      Spouse name: N/A  Number of children: N/A    Years of education: N/A     Occupational History    Not on file. Social History Main Topics    Smoking status: Former Smoker     Packs/day: 0.25     Years: 10.00     Quit date: 1992    Smokeless tobacco: Never Used    Alcohol use No    Drug use: No    Sexual activity: Not on file     Other Topics Concern    Not on file     Social History Narrative       Family History   Problem Relation Age of Onset    Heart Attack Father     Diabetes Mother        Allergies   Allergen Reactions    Penicillins Not Reported This Time       Review of Systems:  Positive in bold. unable to obtain    Physical Exam:      Visit Vitals    /58    Pulse (!) 59    Temp 98.3 °F (36.8 °C)    Resp 12    Ht 5' 4\" (1.626 m)    Wt 49.9 kg (110 lb)    SpO2 100%    BMI 18.88 kg/m2       Physical Exam:  Gen:  Sedated; intubated  HEENT:  Normal cephalic atraumatic, left pupil pinpoint, right pupil dilated and irregular  Neck:  Supple, No JVD  Lungs:  Clear bilaterally, no wheeze, no rales, breath sounds equal  Cardiovascular:  Regular Rate and Rhythm, normal S1 and S2, no audible murmur. Capillary refill: < 3 seconds. Abdomen:  Soft,  non distended, colostomy bag in place. normal bowel sounds, no guarding. Extremities:  Well perfused, no cyanosis, no edema  Neurological:  unable to assess, sedated. Skin:  No rashes or moles. Turgor and color normal  Mental Status:  Unable to assess      Laboratory Studies: All lab results for the last 24 hours reviewed. Assessment/Plan     Active Problems:    Cerebral hemorrhage ([address] (5/6/2017)        PLAN:    Respiratory: patient intubated. Admit to ICU. Intensivist notified. Neuro:  Cerebral hemorrhage. Neurosurgery notified. Patient not a surgical candidate. Recommending comfort care. CV: HTN-  Blood pressure fairly controlled.    Monitor vitals as per unit     Heme:  DVT prophylaxis   Bilateral lower extremity compression devices    Misc:  FULL CODE   Complete bedrest   Monitor intake and output   Monitor vitals as per unit   Neurovascular checks   Replace electrolytes as needed. Follow up am labs.          MD VERNELL Turk / Yulia Sinha  D:  05/06/2017   21:53  T:  05/07/2017   00:20  Job #:  [id number]

## 2017-05-07 NOTE — CONSULTS
[facility]    Name:  Basim Godinez  MR#:  [id number]  :  1930  Account #:  [de-identified]  Date of Adm:  2017  Date of Consultation:      CRITICAL CARE CONSULTATION    HISTORY OF PRESENT ILLNESS: This is an 80-year-old lady who  presented to the emergency room after she had an episode of vomiting  and altered mental status. The patient in the emergency room, was  evaluated. She was found to have a very large left-sided intracranial  hemorrhage. Neurosurgery were consulted and Neurology were  consulted and the plan was for conservative management and  adjustment to keep her blood pressure less than 685 systolic. Otherwise, supportive management. PAST MEDICAL HISTORY: Includes:  1. Colon cancer. 2. Diverticulosis. 3. Gastroesophageal reflux disease. 4. Hypertension. 5. Paget disease of the bone. 6. Thyroid disease. PAST SURGICAL HISTORY: Includes:  1. Colonoscopy. 2. Colostomy. 3. Hysterectomy. 3. Polypectomy. SOCIAL HISTORY: The patient is  and she was a former  smoker. There is no history of alcohol or illicit drug use. FAMILY HISTORY: Positive for heart disease and diabetes. ALLERGIES: SHE IS ALLERGIC TO PENICILLIN BUT NO  REACTION IS REPORTED. REVIEW OF SYSTEMS  When I saw the patient in the ICU, the patient was off sedation. She  was not responsive to verbal stimuli. She was withdrawing to pain. It is  very difficult currently to assess her neurological function. There is no  lateralization of her neurological function. As I said, she is only  withdrawing to pain now. PHYSICAL EXAMINATION  VITAL SIGNS: Showed a blood pressure is 128/49, heart rate is 93,  and she is saturating 100% on 50% FIO2. CARDIAC: Within normal limits. There is no added sounds or murmur. LUNGS: Chest is clear to auscultate. ABDOMEN: Soft with no organomegaly. EXTREMITIES: There is no peripheral edema.     IMAGING STUDIES: Her chest x-ray was reviewed and it was within  normal limits, which the endotracheal tube is in good position. There is  no pneumonic process. There is no cardiomegaly. Her CT scan showed a large intraparenchymal hemorrhage in the left  basal ganglia, measuring 4.7 x 4.5 x 4 with significant extension of this  hemorrhage into the ventricles. There is midline shift to the right by  approximately 6 mm. There is dilatation of the right lateral ventricle  significant small vessel ischemic disease. LABORATORY DATA: Her hematology showed white cell count of 9.6,  hemoglobin of 12.4, and platelet count of 657. Her chemistry showed  sodium of 146, potassium 4.0, chloride of 109, CO2 of 23, anion gap  14, glucose 220, BUN 14, creatinine 1. 19.    ASSESSMENT AND PLAN:  1. Respiratory failure secondary to altered mental status secondary to  extensive intracerebral hemorrhage, most likely hypertensive  hemorrhage, which was reviewed by Neurosurgery and Neurology and  the management is just supportive. I have had the detailed family  meeting today, this morning, explaining the extent of the situation to  the family. She has 3 children. I saw 2 daughters and a son-in-law and  I have explained to them the situation. For the time being, the patient seems to be focal in the prone time  being, the patient is FULL CODE, and will just do supportive  management, keeping blood pressure systolic less than 656. Of course, supporting her breathing. She will get routine ICU care. 2. Hypertension, currently, as I said, controlled. 3. History of colon cancer. 4. History of Paget disease of the bone. 5. History of diverticulosis. The prognosis is extremely guarded, and I think we would need more  family meeting because I could see that the family is not yet realizing  the extent of the disease and also consulting Neurology, although the  patient has a telemetry neuro consult, our neurologist to see the  patient and talk to the family.     Total time spent on this patient is 45 minutes excluding procedures. MD ZAIDA Sherwood / BRUCE  D:  05/07/2017   08:15  T:  05/07/2017   11:03  Job #:  [id number]

## 2017-05-07 NOTE — CONSULTS
NEUROSURGERY CONSULT NOTE      REQUESTING PHYSICIAN: Trauma   CONSULTING PHYSICAIN: Kimberly Jeronimo MD    REASON FOR CONSULT: left basal ganglia hemorrhage      HPI: This patient is a 79 yo who presents with decrease in level of consciousness followed by obtundation and nausea and emesis. She was into baited and brought here. CT scan of the brain is reviewed and shows a large left sided hemorrhage centered in the basal ganglia with significant interventricular extension and left to right shift. She also has a history of dementia and colon cancer. 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    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([facility] [place] [address] Z93.3    CKD (chronic kidney disease) stage 3, GFR 30-59 ml/min N18.3    ACP (advance care planning) Z71.89     Allergies   Allergen Reactions    Penicillins Not Reported This Time     Past Medical History:   Diagnosis Date    ACP (advance care planning) 11/17/2016    Anemia NEC     Colon cancer ([facility] [place] [address] 4/23/2010    Colon polyps     Descending colon; Sigmoid colon X2    Diverticulosis     Gastric polyps     GERD (gastroesophageal reflux disease)     Glaucoma 4/23/2010    Hypertension 4/23/2010    Paget's bone disease 12/13/2013    Thyroid disease      Past Surgical History:   Procedure Laterality Date    ENDOSCOPY, COLON, DIAGNOSTIC  4/8/2010    HX COLOSTOMY  8/30/2010    HX GI  8/30/2010    Abdominoperineal Resection - Rectal cancer    HX HYSTERECTOMY      HX POLYPECTOMY  4/8/2010     Social History     Social History    Marital status:      Spouse name: N/A    Number of children: N/A    Years of education: N/A     Occupational History    Not on file.      Social History Main Topics    Smoking status: Former Smoker     Packs/day: 0.25     Years: 10.00     Quit date: 1992    Smokeless tobacco: Never Used    Alcohol use No    Drug use: No    Sexual activity: Not on file     Other Topics Concern    Not on file     Social History Narrative     Family History   Problem Relation Age of Onset    Heart Attack Father     Diabetes Mother      Current Facility-Administered Medications   Medication Dose Route Frequency Provider Last Rate Last Dose    succinylcholine (ANECTINE) 20 mg/mL injection             etomidate (AMIDATE) 2 mg/mL injection             niCARdipine (CARDENE) 25 mg in 0.9% sodium chloride 250 mL infusion  0-15 mg/hr IntraVENous TITRATE Nneka Mari MD 50 mL/hr at 05/06/17 2006 5 mg/hr at 05/06/17 2006    propofol (DIPRIVAN) infusion  5-50 mcg/kg/min IntraVENous TITRATE Nneka Mari MD 10.5 mL/hr at 05/06/17 2011 35 mcg/kg/min at 05/06/17 2011    niCARdipine in Saline (CARDENE) 0.1mg/mL 200 ml premix infusion        Stopped at 05/06/17 1958     Current Outpatient Prescriptions   Medication Sig Dispense Refill    enalapril (VASOTEC) 5 mg tablet TAKE 1 TABLET BY MOUTH EVERY DAY 90 Tab 4    levothyroxine (SYNTHROID) 75 mcg tablet Take 1 Tab by mouth daily. 90 Tab 4    brimonidine-timolol (COMBIGAN) 0.2-0.5 % drop ophthalmic solution Administer 1 Drop to both eyes daily.  calcium-vitamin D (OYSTER SHELL) 500 mg(1,250mg) -200 unit per tablet Take 1 Tab by mouth three (3) times daily (with meals). Indications: OSTEOPOROSIS 90 Tab 1    latanoprost (XALATAN) 0.005 % ophthalmic solution Administer 1 Drop to both eyes nightly. EXAM:  Temp Max past 24hrs  No data recorded. Visit Vitals    /78    Pulse 60    Resp 12    Ht 5' 4\" (1.626 m)    Wt 49.9 kg (110 lb)    SpO2 100%    BMI 18.88 kg/m2       Neurologic Exam   Mental Status exam reported as obtunded, into baited, pupils are unequal and post surgical and minimally to non-reactive.  Mild shoulder shrug to painful stimulus and son with drawl of the lower limb (spinal reflex)   Affect and mood    Speech   GCS:   Pupils  Motor exam:      Sensory exam:  Light touch and pin prick sensation     Rectal exam:  Deferred    Cerbellar exam:        LABS:  Basic Metabolic Profile   Lab Results   Component Value Date     05/06/2017    CO2 31 05/06/2017    BUN 16 05/06/2017         CBC w/Diff    Lab Results   Component Value Date/Time    WBC 9.6 05/06/2017 07:00 PM    RBC 4.05 (L) 05/06/2017 07:00 PM    HCT 36.6 05/06/2017 07:00 PM    MCV 90.4 05/06/2017 07:00 PM    MCH 30.6 05/06/2017 07:00 PM    MCHC 33.9 05/06/2017 07:00 PM    RDW 14.2 05/06/2017 07:00 PM    Lab Results   Component Value Date/Time    BANDS 1 06/07/2016 03:03 AM    MONOS 6 05/06/2017 07:00 PM    EOS 1 05/06/2017 07:00 PM    BASOS 0 05/06/2017 07:00 PM      Coagulation   Lab Results   Component Value Date    INR 1.3 (H) 05/06/2017    APTT 30.3 05/06/2017                 STUDIES:        IMPRESSION:dominant hemisphere large basil ganglia hemorrhage with interventricular extension    PLAN:  This is all assuming the setting of dementia and colon cancer. This carries a very poor prognosis for any quality of life. Neurosurgical intervention would not improve the outcome. Discussions are being had with the power of .       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    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([facility] [address] Z93.3    CKD (chronic kidney disease) stage 3, GFR 30-59 ml/min N18.3    ACP (advance care planning) Z71.89     Allergies   Allergen Reactions    Penicillins Not Reported This Time     Past Medical History:   Diagnosis Date    ACP (advance care planning) 11/17/2016    Anemia NEC     Colon cancer ([facility] [address] 4/23/2010    Colon polyps     Descending colon; Sigmoid colon X2    Diverticulosis     Gastric polyps     GERD (gastroesophageal reflux disease)     Glaucoma 4/23/2010    Hypertension 4/23/2010    Paget's bone disease 12/13/2013    Thyroid disease      Past Surgical History:   Procedure Laterality Date    ENDOSCOPY, COLON, DIAGNOSTIC  4/8/2010    HX COLOSTOMY  8/30/2010    HX GI  8/30/2010    Abdominoperineal Resection - Rectal cancer    HX HYSTERECTOMY      HX POLYPECTOMY  4/8/2010     Social History     Social History    Marital status:      Spouse name: N/A    Number of children: N/A    Years of education: N/A     Occupational History    Not on file. Social History Main Topics    Smoking status: Former Smoker     Packs/day: 0.25     Years: 10.00     Quit date: 1992    Smokeless tobacco: Never Used    Alcohol use No    Drug use: No    Sexual activity: Not on file     Other Topics Concern    Not on file     Social History Narrative     Family History   Problem Relation Age of Onset    Heart Attack Father     Diabetes Mother      Current Facility-Administered Medications   Medication Dose Route Frequency Provider Last Rate Last Dose    succinylcholine (ANECTINE) 20 mg/mL injection             etomidate (AMIDATE) 2 mg/mL injection             niCARdipine (CARDENE) 25 mg in 0.9% sodium chloride 250 mL infusion  0-15 mg/hr IntraVENous TITRATE Tyree Armendariz MD 50 mL/hr at 05/06/17 2006 5 mg/hr at 05/06/17 2006    propofol (DIPRIVAN) infusion  5-50 mcg/kg/min IntraVENous TITRATE Tyree Armendariz MD 10.5 mL/hr at 05/06/17 2011 35 mcg/kg/min at 05/06/17 2011    niCARdipine in Saline (CARDENE) 0.1mg/mL 200 ml premix infusion        Stopped at 05/06/17 1958     Current Outpatient Prescriptions   Medication Sig Dispense Refill    enalapril (VASOTEC) 5 mg tablet TAKE 1 TABLET BY MOUTH EVERY DAY 90 Tab 4    levothyroxine (SYNTHROID) 75 mcg tablet Take 1 Tab by mouth daily.  90 Tab 4    brimonidine-timolol (COMBIGAN) 0.2-0.5 % drop ophthalmic solution Administer 1 Drop to both eyes daily.  calcium-vitamin D (OYSTER SHELL) 500 mg(1,250mg) -200 unit per tablet Take 1 Tab by mouth three (3) times daily (with meals). Indications: OSTEOPOROSIS 90 Tab 1    latanoprost (XALATAN) 0.005 % ophthalmic solution Administer 1 Drop to both eyes nightly. @actmed@            EXAM:  Temp Max past 24hrs  No data recorded.     Visit Vitals    /78    Pulse 60    Resp 12    Ht 5' 4\" (1.626 m)    Wt 49.9 kg (110 lb)    SpO2 100%    BMI 18.88 kg/m2       Neurologic Exam  Mental Status   Affect and mood    Speech   GCS:   Pupils  Motor exam:      Sensory exam:  Light touch and pin prick sensation     Rectal exam:  Deferred    Cerbellar exam:        LABS:  Basic Metabolic Profile   Lab Results   Component Value Date     05/06/2017    CO2 31 05/06/2017    BUN 16 05/06/2017         CBC w/Diff    Lab Results   Component Value Date/Time    WBC 9.6 05/06/2017 07:00 PM    RBC 4.05 (L) 05/06/2017 07:00 PM    HCT 36.6 05/06/2017 07:00 PM    MCV 90.4 05/06/2017 07:00 PM    MCH 30.6 05/06/2017 07:00 PM    MCHC 33.9 05/06/2017 07:00 PM    RDW 14.2 05/06/2017 07:00 PM    Lab Results   Component Value Date/Time    BANDS 1 06/07/2016 03:03 AM    MONOS 6 05/06/2017 07:00 PM    EOS 1 05/06/2017 07:00 PM    BASOS 0 05/06/2017 07:00 PM      Coagulation   Lab Results   Component Value Date    INR 1.3 (H) 05/06/2017    APTT 30.3 05/06/2017                 STUDIES:        IMPRESSION:    PLAN:        Richard Izquierdo MD  5/6/2017   8:16 PM

## 2017-05-07 NOTE — PROGRESS NOTES
Patient intubated for airway protection per family then taken to ct and place on mechanical ventilator patient stable at this time suctioned thick tan secretions.  spo2 100% HR 61

## 2017-05-07 NOTE — PROGRESS NOTES
05/07/2017 - Patient chart reviewed however patient with active bedrest orders and on vent. PT eval will be held until Bedrest orders are removed or it is clearly documented to see patient at bed level for therapy. Will continue to f/u with patient. Thank you.     Otto Cooks, PT, DPT

## 2017-05-07 NOTE — ROUTINE PROCESS
0700: Assumed care of pt at this time. Received verbal bedside shift report from [facility] (offgoing) to Juan Manuel Lopez RN (oncoming). Dual RN neuro assessment complete. 1900: Verbal bedside shift report given to [facility] (oncoming) from [address] (offgoing).

## 2017-05-07 NOTE — PROGRESS NOTES
Canyd   Discharge Planning/ Assessment    Reasons for Intervention: Patient is intubated on the ventilator, discharge summary completed via chart review. Her discharge plan is dependent upon her successful extubation.      High Risk Criteria  [x] Yes  []No   Physician Referral  [] Yes  [x]No        Date    Nursing Referral  [] Yes  [x]No        Date    Patient/Family Request  [] Yes  [x]No        Date       Resources:    Medicare  [x] Yes  []No   Medicaid  [] Yes  [x]No   No Resources  [] Yes  [x]No   Private Insurance  [] Yes  [x]No    Name/Phone Number    Other  [] Yes  [x]No        (i.e. Workman's Comp)         Prior Services:    Prior Services  [] Yes  [x]No   Home Health  [] Yes  [x]No   [address]  [] Yes  [x]No        [facility] [address] Program  [] Yes  [x]No       Meals on Wheels  [] Yes  [x]No   Office on Aging  [] Yes  [x]No   Transportation Services  [] Yes  [x]No   Nursing Home  [] Yes  [x]No        Nursing Home Name    [address]  [] Yes  [x]No        [address] Name    Other       Information Source:      Information obtained from  [] Patient  [] Parent   [] Annmarie Dunham  [] Child  [] Spouse   [] Significant Other/Partner   [] Friend      [] EMS    [] Nursing Home Chart          [x] Other: chart   Chart Review  [] Yes  []No     Family/Support System:    Patient lives with  [] Alone    [] Spouse   [] Significant Other  [] Children  [] Caretaker   [] Parent  [] Sibling     [] Other       Other Support System:    Is the patient responsible for care of others  [] Yes  [x]No   Information of person caring for patient on  discharge self   Managers financial affairs independently  [x] Yes  []No   If no, explain:      Status Prior to Admission:    Mental Status  [x] Awake  [x] Alert  [x] Oriented  [] Quiet/Calm [] Lethargic/Sedated   [] Disoriented  [] Restless/Anxious  [] Combative   Personal Care  [] Dependent  [x] Independent Personal Care  [] Requires Assistance   Meal Preparation Ability  [x] Independent   [] Standby Assistance   [] Minimal Assistance   [] Moderate Assistance  [] Maximum Assistance     [] Total Assistance   Chores  [x] Independent with Chores   [] N/A Nursing Home Resident   [] Requires Assistance   Bowel/Bladder  [x] Continent  [] Catheter  [] Incontinent  [] Ostomy Self-Care    [] Urine Diversion Self-Care  [] Maximum Assistance     [] Total Assistance   Number of Persons needed for assistance    DME at home  [] Dirk Bowl, Ladell Marialuisa  [] Dirk Bowl, Straight   [] Commode    [] Bathroom/Grab Bars  [] Hospital Bed  [] Nebulizer  [] Oxygen           [] Raised Toilet Seat  [] Shower Chair  [] Side Rails for Bed   [] Tub Transfer Bench   [] Towana Prey  [] Richard Verdugo      [] Other:   Vendor      Treatment Presently Receiving:    Current Treatments  [x] ventilator  [] Dialysis  [] Insulin  [] IVAB [x] IVF   [x] O2  [] PCA   [] PT   [x] RT   [] Tube Feedings   [] Wound Care     Psychosocial Evaluation:    Verbalized Knowledge of Disease Process  [] Patient  [x]Family   Coping with Disease Process  [] Patient  [x]Family   Requires Further Counseling Coping with Disease Process  [] Patient  []Family     Identified Projected Needs:    Home Health Aid  [] Yes  [x]No   Transportation  [x] Yes  []No   Education  [] Yes  [x]No        Specific Education     Financial Counseling  [] Yes  [x]No   Inability to Care for Self/Will Require 24 hour care  [] Yes  [x]No   Pain Management  [x] Yes  []No   Home Infusion Therapy  [] Yes  [x]No   Oxygen Therapy  [x] Yes  []No   DME  [] Yes  [x]No   Long Term Care Placement  [] Yes  [x]No   Rehab  [x] Yes  []No   Physical Therapy  [x] Yes  []No   Needs Anticipated At This Time  [x] Yes  []No     Intra-Hospital Referral:    [facility]  [] Yes  [x]No     [] Yes  []No   Patient Representative  [x] Yes  []No   Staff for Teaching Needs  [] Yes  [x]No   Specialty Teaching Needs Diabetic Educator  [] Yes  [x]No   Referral for Diabetic Educator Needed  [] Yes  [x]No  If Yes, place order for Nutritionist or Diabetic Consult     Tentative Discharge Plan:    Home with No Services  [] Yes  [x]No   Home with [address]  [] Yes  [x]No        If Yes, specify type    Home Care Program  [] Yes  [x]No        If Yes, specify type    Meals on Wheels  [] Yes  [x]No   Office of Aging  [] Yes  [x]No   NHP  [] Yes  [x]No   Return to the Nursing Home  [] Yes  [x]No   Rehab Therapy  [x] Yes  []No   Acute Rehab  [] Yes  [x]No   Subacute Rehab  [x] Yes  []No   Private Care  [] Yes  [x]No   Substance Abuse Referral  [] Yes  [x]No   Transportation  [] Yes  [x]No   Chore Service  [] Yes  [x]No   Inpatient Hospice  [] Yes  [x]No   OP RT  [] Yes  [x] No   OP Hemo  [] Yes  [x] No   OP PT  [] Yes  [x]No   Support Group  [] Yes  [x]No   Reach to Recovery  [] Yes  [x]No   OP Oncology Clinic  [] Yes  [x]No   Clinic Appointment  [] Yes  [x]No   DME  [] Yes  [x]No   Comments    Name of D/C Planner or  Given to Patient or Family Lindsay Ponce RN   Phone Number [phone]   Date May 7, 2017   Time 858 am   If you are discharged home, whom do you designate to participate in your discharge plan and receive any information needed?      Enter name of gabi Kourtney Bradley  daughter        Phone # of gabi         Address of gavin         Updated May 7, 2017        Patient refused to designate any           individual

## 2017-05-07 NOTE — PROGRESS NOTES
Patient in bed on back with head elevated - BBS equal and diminished - patient suctioned andf RAMON tube sleared - ETT secure at 22 at the lips and moved to the left side of the mouth - MVB at [facility] - vent alarms on and functional    2824 - patient suctioned and RAMON tube cleared - ETT moved to the right side of the mouth

## 2017-05-07 NOTE — ED NOTES
Family at bedside and updated regarding patient's prognosis and state understanding. Pt is intubated and tolerating well, restraints in place, tolerating sedation well.

## 2017-05-07 NOTE — PROGRESS NOTES
Speech Therapy Note:    SLP acknowledging eval & tx orders; however, pt not appropriate as she is currently on vent. Will d/c orders as accordingly and available for evaluation upon MD orders. Sharon BANEGAS  [facility] [address] [facility]  Office: [phone]  Pager: [phone]

## 2017-05-07 NOTE — PROGRESS NOTES
Problem: Ventilator Management  Goal: *Adequate oxygenation and ventilation  Rec'd pt on vent:  AC VC+, Rate=12, Peep=5, Fio2=50%  Wean Fio2=40%  Vent check completed    1700-Wean Fio2=35%    Plan:  Monitor pt on vent. Wean and or titrate as tolerated to maintain sats. Provide supportive care for ongoing family meetings, possible palliative/comfort care. Resp Goal:  Maintain respiratory stability & provide supportive care during ongoing family meetings.

## 2017-05-07 NOTE — PROGRESS NOTES
ABG obtained and results discussed with Dr. Keven Garcia - FIO2 decxreased to 50%, no other vent setting changes at this time    2225 - patient transported to ICU on transport vent without adverse effects noted - BBS equal and diminished - patient placed on vent at previous settings

## 2017-05-07 NOTE — PROGRESS NOTES
NEUROSURGERY PROGRESS NOTE     SURGERY DATE: 5/6/2017                Dx:  Cerebral hemorrhage (HCC)        SUBJECTIVE:  No improvement over night    EXAM:    Vitals:    05/07/17 0800 05/07/17 0900 05/07/17 0906 05/07/17 1000   BP: 128/49 120/43  137/47   Pulse: 87 87 84 (!) 104   Resp: 13 12 13    Temp: 99.3 °F (37.4 °C)      SpO2: 100% 100% 100% 100%   Weight:       Height:           No change in exam, no eye opening, no commands, some expected mvmt L side    LABS:   Recent Results (from the past 24 hour(s))   GLUCOSE, POC    Collection Time: 05/06/17  6:58 PM   Result Value Ref Range    Glucose (POC) 121 (H) 70 - 110 mg/dL   CBC WITH AUTOMATED DIFF    Collection Time: 05/06/17  7:00 PM   Result Value Ref Range    WBC 9.6 4.6 - 13.2 K/uL    RBC 4.05 (L) 4.20 - 5.30 M/uL    HGB 12.4 12.0 - 16.0 g/dL    HCT 36.6 35.0 - 45.0 %    MCV 90.4 74.0 - 97.0 FL    MCH 30.6 24.0 - 34.0 PG    MCHC 33.9 31.0 - 37.0 g/dL    RDW 14.2 11.6 - 14.5 %    PLATELET 721 195 - 051 K/uL    MPV 10.2 9.2 - 11.8 FL    NEUTROPHILS 62 40 - 73 %    LYMPHOCYTES 31 21 - 52 %    MONOCYTES 6 3 - 10 %    EOSINOPHILS 1 0 - 5 %    BASOPHILS 0 0 - 2 %    ABS. NEUTROPHILS 5.9 1.8 - 8.0 K/UL    ABS. LYMPHOCYTES 3.0 0.9 - 3.6 K/UL    ABS. MONOCYTES 0.6 0.05 - 1.2 K/UL    ABS. EOSINOPHILS 0.1 0.0 - 0.4 K/UL    ABS.  BASOPHILS 0.0 0.0 - 0.06 K/UL    DF AUTOMATED     PROTHROMBIN TIME + INR    Collection Time: 05/06/17  7:00 PM   Result Value Ref Range    Prothrombin time 13.5 11.5 - 15.2 sec    INR 1.1 0.8 - 1.2     METABOLIC PANEL, COMPREHENSIVE    Collection Time: 05/06/17  7:00 PM   Result Value Ref Range    Sodium 144 136 - 145 mmol/L    Potassium 3.7 3.5 - 5.5 mmol/L    Chloride 104 100 - 108 mmol/L    CO2 31 21 - 32 mmol/L    Anion gap 9 3.0 - 18 mmol/L    Glucose 114 (H) 74 - 99 mg/dL    BUN 16 7.0 - 18 MG/DL    Creatinine 1.22 0.6 - 1.3 MG/DL    BUN/Creatinine ratio 13 12 - 20      GFR est AA 51 (L) >60 ml/min/1.73m2    GFR est non-AA 42 (L) >60 ml/min/1.73m2    Calcium 9.9 8.5 - 10.1 MG/DL    Bilirubin, total 0.6 0.2 - 1.0 MG/DL    ALT (SGPT) 22 13 - 56 U/L    AST (SGOT) 18 15 - 37 U/L    Alk.  phosphatase 70 45 - 117 U/L    Protein, total 8.1 6.4 - 8.2 g/dL    Albumin 4.0 3.4 - 5.0 g/dL    Globulin 4.1 (H) 2.0 - 4.0 g/dL    A-G Ratio 1.0 0.8 - 1.7     PTT    Collection Time: 05/06/17  7:00 PM   Result Value Ref Range    aPTT 30.3 23.0 - 36.4 SEC   CALCIUM, IONIZED    Collection Time: 05/06/17  7:00 PM   Result Value Ref Range    Ionized Calcium 1.26 1.12 - 1.32 MMOL/L   MAGNESIUM    Collection Time: 05/06/17  7:00 PM   Result Value Ref Range    Magnesium 2.2 1.6 - 2.6 mg/dL   PHOSPHORUS    Collection Time: 05/06/17  7:00 PM   Result Value Ref Range    Phosphorus 3.4 2.5 - 4.9 MG/DL   AMYLASE    Collection Time: 05/06/17  7:00 PM   Result Value Ref Range    Amylase 342 (H) 25 - 115 U/L   LIPASE    Collection Time: 05/06/17  7:00 PM   Result Value Ref Range    Lipase 668 (H) 73 - 393 U/L   CARDIAC PANEL,(CK, CKMB & TROPONIN)    Collection Time: 05/06/17  7:00 PM   Result Value Ref Range     26 - 192 U/L    CK - MB 1.6 <3.6 ng/ml    CK-MB Index 1.6 0.0 - 4.0 %    Troponin-I, Qt. <0.02 0.0 - 0.045 NG/ML   SED RATE (ESR)    Collection Time: 05/06/17  7:00 PM   Result Value Ref Range    Sed rate, automated 34 (H) 0 - 30 mm/hr   LIPID PANEL    Collection Time: 05/06/17  7:00 PM   Result Value Ref Range    LIPID PROFILE          Cholesterol, total 203 (H) <200 MG/DL    Triglyceride 143 <150 MG/DL    HDL Cholesterol 75 (H) 40 - 60 MG/DL    LDL, calculated 99.4 0 - 100 MG/DL    VLDL, calculated 28.6 MG/DL    CHOL/HDL Ratio 2.7 0 - 5.0     HEMOGLOBIN A1C WITH EAG    Collection Time: 05/06/17  7:00 PM   Result Value Ref Range    Hemoglobin A1c 6.1 (H) 4.2 - 5.6 %    Est. average glucose 128 mg/dL   CORTISOL    Collection Time: 05/06/17  7:00 PM   Result Value Ref Range    Cortisol, random 30.4 (H) 3.09 - 22.40 ug/dL   POC INR (AMB)    Collection Time: 05/06/17 7:03 PM   Result Value Ref Range    INR (POC) 1.3 (H) <1.2     EKG, 12 LEAD, INITIAL    Collection Time: 05/06/17  9:17 PM   Result Value Ref Range    Ventricular Rate 61 BPM    Atrial Rate 61 BPM    P-R Interval 144 ms    QRS Duration 66 ms    Q-T Interval 360 ms    QTC Calculation (Bezet) 362 ms    Calculated P Axis 63 degrees    Calculated R Axis 14 degrees    Calculated T Axis -3 degrees    Diagnosis       Normal sinus rhythm  Nonspecific T wave abnormality  Abnormal ECG  When compared with ECG of 01-DEC-2014 05:54,  ST elevation now present in Lateral leads     POC G3    Collection Time: 05/06/17  9:50 PM   Result Value Ref Range    Device: VENT      FIO2 (POC) 100 %    pH (POC) 7.480 (H) 7.35 - 7.45      pCO2 (POC) 45.2 (H) 35.0 - 45.0 MMHG    pO2 (POC) 564 (H) 80 - 100 MMHG    HCO3 (POC) 33.7 (H) 22 - 26 MMOL/L    sO2 (POC) 100 (H) 92 - 97 %    Base excess (POC) 10 mmol/L    Mode ASSIST CONTROL      Tidal volume 350 ml    Set Rate 12 bpm    PEEP/CPAP (POC) 5 cmH2O    PIP (POC) 22      Allens test (POC) YES      Inspiratory Time 0.9 sec    Total resp.  rate 12      Site RIGHT RADIAL      Specimen type (POC) ARTERIAL      Performed by Marycruz Moscoso     Volume control plus YES     TYPE & SCREEN    Collection Time: 05/06/17 11:30 PM   Result Value Ref Range    Crossmatch Expiration 05/09/2017     ABO/Rh(D) B POSITIVE     Antibody screen PENDING    AMMONIA    Collection Time: 05/06/17 11:30 PM   Result Value Ref Range    Ammonia 19 11 - 32 UMOL/L   OCCULT BLOOD, STOOL    Collection Time: 05/07/17 12:35 AM   Result Value Ref Range    Occult blood, stool NEGATIVE  NEG     CALCIUM, IONIZED    Collection Time: 05/07/17  4:15 AM   Result Value Ref Range    Ionized Calcium 1.17 1.12 - 1.32 MMOL/L   MAGNESIUM    Collection Time: 05/07/17  4:15 AM   Result Value Ref Range    Magnesium 1.9 1.6 - 2.6 mg/dL   PHOSPHORUS    Collection Time: 05/07/17  4:15 AM   Result Value Ref Range    Phosphorus 2.0 (L) 2.5 - 4.9 MG/DL PROTHROMBIN TIME + INR    Collection Time: 05/07/17  4:15 AM   Result Value Ref Range    Prothrombin time 14.4 11.5 - 15.2 sec    INR 1.2 0.8 - 1.2     PTT    Collection Time: 05/07/17  4:15 AM   Result Value Ref Range    aPTT 31.5 23.0 - 85.3 SEC   METABOLIC PANEL, BASIC    Collection Time: 05/07/17  4:15 AM   Result Value Ref Range    Sodium 146 (H) 136 - 145 mmol/L    Potassium 4.0 3.5 - 5.5 mmol/L    Chloride 109 (H) 100 - 108 mmol/L    CO2 23 21 - 32 mmol/L    Anion gap 14 3.0 - 18 mmol/L    Glucose 220 (H) 74 - 99 mg/dL    BUN 14 7.0 - 18 MG/DL    Creatinine 1.19 0.6 - 1.3 MG/DL    BUN/Creatinine ratio 12 12 - 20      GFR est AA 52 (L) >60 ml/min/1.73m2    GFR est non-AA 43 (L) >60 ml/min/1.73m2    Calcium 9.4 8.5 - 10.1 MG/DL   CARDIAC PANEL,(CK, CKMB & TROPONIN)    Collection Time: 05/07/17  4:15 AM   Result Value Ref Range     (H) 26 - 192 U/L    CK - MB 3.7 (H) <3.6 ng/ml    CK-MB Index 1.2 0.0 - 4.0 %    Troponin-I, Qt. 0.02 0.0 - 0.045 NG/ML   GLUCOSE, POC    Collection Time: 05/07/17  8:47 AM   Result Value Ref Range    Glucose (POC) 198 (H) 70 - 110 mg/dL            IMPRESSION: large deep L basal ganglia hemorrhage    PLAN:    Very poor prognosis  No neurosurgical intervention indicated    Scott Oscar MD  May 7, 2017  11:59 AM

## 2017-05-07 NOTE — ED NOTES
Pt intubated by Dr. Salma Zambrano with 6.0 ET tube 23 at the teeth.    Positive color change  Breath sounds present bilaterally

## 2017-05-07 NOTE — PROGRESS NOTES
2151 - Report received from [facility]. Orders, medications, labs, Neuro exam, and skin reviewed. 2230 - Pt arrived on unit. Propofol stopped. 2300 - Nicardipine restarted at 10 mg/hr for SBP = 170s. 2345 - Nicardipine rate increased to 15 mg/hr. 0730 - Report given to Kayce Ricci RN. Orders, medications, labs, Neuro exam, and skin reviewed.

## 2017-05-08 NOTE — PROGRESS NOTES
5914: PT orders received and chart reviewed. Patient currently intubated and not following commands per discussion with RN Clay James PT at this time secondary to patient not appropriate. Please re-consult PT if patient functional status changes.      Thank you,   Irena Felipe, PT, DPT

## 2017-05-08 NOTE — PROGRESS NOTES
Patient in bed on back with head elevated - BBS equal and sl diminished - ETT secure at 22 at the lips and moved to the right side of the mouth - [facility] at [facility] - patient suctioned and RAMON tube cleared - vent alarms on and fumctional    2345 - patient suctioned and RAMON tube cleared - ETT moved to the center of the mouth    0405 - patient suctioned and RAMON tube cleared - ETT moved to the left side of the mouth

## 2017-05-08 NOTE — PROGRESS NOTES
OT order received and chart reviewed. Spoke with Demario Michelle, who discussed with KASEY Zhang. Pt intubated and not following commands. Pt not appropriate for skilled OT therapy at this time. Will acknowledge and discontinue OT orders at this time. Please re-order when pt able to participate in functional therapy. Thank you.     Braxton Aguilera MS OTR/L  Office Ext: [phone]  Pager: [phone]

## 2017-05-08 NOTE — PROGRESS NOTES
-0720-Received patient on ventilator ACVC+ 12, VT-350, PEEP-5, FIO2-35%. O2 Sats-100% HR-104, RR-14. ETT noted to be patent and secure. Respiratory will continue to monitor. -[address]    -1515-Pt. Remains on above settings. O2 Sats-100% HR-108, RR-20. ETT remains patent and secure. No weaning this shift. Pallative consulted. -[address]

## 2017-05-08 NOTE — PROGRESS NOTES
Problem: Hemorrhagic Stroke: Day 3  Goal: *Verbalizes anxiety and depression are reduced or absent  Outcome: Not Progressing Towards Goal  Intubated  Goal: *Tolerating diet  Outcome: Not Progressing Towards Goal  NPO

## 2017-05-08 NOTE — MED STUDENT NOTES
*ATTENTION:  This note has been created by a medical student for educational purposes only. Please do not refer to the content of this note for clinical decision-making, billing, or other purposes. Please see attending physicians note to obtain clinical information on this patient. *       Student Progress Note  Please refer to attendings daily rounding note for full details      Patient: Amber Michel MRN: [id number]  CSN: [id number]    YOB: 1930  Age: 80 y.o. Sex: female    DOA: 5/6/2017 LOS:  LOS: 2 days          Chief Complaint:  Altered mental status, vomiting    Subjective:    79 yo female with Hx of CKD, colon cancer with colostomy, HTN, hypothyroid, Paget's disease, glaucoma and anemia admited on 5/6 from the ED for altered mental status and vomiting. Pt was on a walk with her daughter when she suddenly started complaining of right sided calf pain, and then had difficulty walking. Daughter was able to help her to the car at which time she began having difficulty with her speech, started drooling and was vomiting. She was brought to the ED where CT showed a left sided basal ganglia hemorrhage. Neurosurgery was consulted and determined no surgical intervention was indicated. Neurology was also consulted, is following the patient and determined that supportive measures with goal systolic blood pressure below 140 are most appropriate. Today she is int he NSICU, intubated, resting comfortably and in NAD.  Blood pressure is above the 366 systolic goal.       Objective:      Visit Vitals    /56    Pulse 96    Temp 99.9 °F (37.7 °C)    Resp 15    Ht 5' 4\" (1.626 m)    Wt 42.7 kg (94 lb 2.2 oz)    SpO2 100%    BMI 16.16 kg/m2         Physical Exam:  Gen: intubated, unconscious, NAD  HEENT: nontraumatic, normocephalic, trachea midline  CV: RRR x4, no murmurs, rubs or gallops  Resp: CTA x12, no wheexing, rales or rhonchi   Abd: normal bowel sounds, non distended, no organomegaly   Extrem:  No peripheral edema in lower extremities bilaterally, onychomycosis lower extremities bilaterally  Skin: dry skin on lower extremities bilaterally, normal color  Neuro: withdrawal to pain, able to move hand on right side only when asked to squeeze hand although it is not a squeezing motion. Eyelid movement when asked to try to open eyes. Does not move toes when asked. I have reviewed the patient's Labs and Radiology studies. Lab Results   Component Value Date/Time    WBC 9.6 05/06/2017 07:00 PM    WBC 6.5 05/30/2012 10:17 AM    Hemoglobin (POC) 9.2 04/07/2010 05:19 PM    HGB 12.4 05/06/2017 07:00 PM    Hematocrit (POC) 27 04/07/2010 05:19 PM    HCT 36.6 05/06/2017 07:00 PM    PLATELET 861 58/57/5743 07:00 PM    MCV 90.4 05/06/2017 07:00 PM     Lab Results   Component Value Date/Time    Sodium 152 05/08/2017 03:50 AM    Potassium 4.1 05/08/2017 03:50 AM    Chloride 117 05/08/2017 03:50 AM    CO2 23 05/08/2017 03:50 AM    Anion gap 12 05/08/2017 03:50 AM    Glucose 127 05/08/2017 03:50 AM    BUN 11 05/08/2017 03:50 AM    Creatinine 1.15 05/08/2017 03:50 AM    BUN/Creatinine ratio 10 05/08/2017 03:50 AM    GFR est AA 54 05/08/2017 03:50 AM    GFR est non-AA 45 05/08/2017 03:50 AM    Calcium 9.4 05/08/2017 03:50 AM    Bilirubin, total 0.6 05/06/2017 07:00 PM    AST (SGOT) 18 05/06/2017 07:00 PM    Alk.  phosphatase 70 05/06/2017 07:00 PM    Protein, total 8.1 05/06/2017 07:00 PM    Albumin 4.0 05/06/2017 07:00 PM    Globulin 4.1 05/06/2017 07:00 PM    A-G Ratio 1.0 05/06/2017 07:00 PM    ALT (SGPT) 22 05/06/2017 07:00 PM       Intake/Output Summary (Last 24 hours) at 05/08/17 1009  Last data filed at 05/08/17 0900   Gross per 24 hour   Intake          2459.59 ml   Output             1540 ml   Net           919.59 ml       Current Facility-Administered Medications:     sodium phosphate 6 mmol in 0.9% sodium chloride 250 mL infusion, 6 mmol, IntraVENous, ONCE, Lacy Perez MD, 6 mmol at 05/08/17 0814    dextrose 5% - 0.9% NaCl with KCl 20 mEq/L infusion, 37 mL/hr, IntraVENous, CONTINUOUS, Sarah Vance MD, Last Rate: 37.4 mL/hr at 05/08/17 0621, 37 mL/hr at 05/08/17 0621    insulin lispro (HUMALOG) injection, , SubCUTAneous, Q6H, Tessy Pro MD, 2 Units at 05/08/17 0612    glucose chewable tablet 16 g, 4 Tab, Oral, PRN, Tessy Pro MD    glucagon (GLUCAGEN) injection 1 mg, 1 mg, IntraMUSCular, PRN, Tessy Pro MD    dextrose (D50W) injection syrg 12.5-25 g, 25-50 mL, IntraVENous, PRN, Tessy Pro MD    niCARdipine (CARDENE) 25 mg in 0.9% sodium chloride 250 mL infusion, 0-15 mg/hr, IntraVENous, TITRATE, Tyree Armendariz MD, Stopped at 05/08/17 0104    propofol (DIPRIVAN) infusion, 5-50 mcg/kg/min, IntraVENous, TITRATE, Tyree Armendariz MD, Stopped at 05/06/17 2230    fentaNYL citrate (PF) injection 50 mcg, 50 mcg, IntraVENous, Q1H PRN, Tyree Armendariz MD, 50 mcg at 05/06/17 2038    ELECTROLYTE REPLACEMENT PROTOCOL, 1 Each, Other, Karla Franco MD, 1 Each at 05/07/17 2200    ELECTROLYTE REPLACEMENT PROTOCOL, 1 Each, Other, Karla Franco MD, 1 Each at 05/08/17 0600    ELECTROLYTE REPLACEMENT PROTOCOL, 1 Each, Other, Karla Franco MD, 1 Each at 05/08/17 0600    PHARMACY INFORMATION NOTE, 1 Each, Other, Juancarlos Koo MD, 1 Each at 05/08/17 0600    chlorhexidine (PERIDEX) 0.12 % mouthwash 15 mL, 15 mL, Oral, Q12H, Joy Albarado MD, 15 mL at 05/08/17 0931    ondansetron (ZOFRAN) injection 1 mg, 1 mg, IntraVENous, Q6H PRN, Tessy Pro MD    acetaminophen (TYLENOL) tablet 650 mg, 650 mg, Oral, Q4H PRN, Tessy Pro MD    acetaminophen (TYLENOL) suppository 650 mg, 650 mg, Rectal, Q4H PRN, Tessy Pro MD    senna-docusate (PERICOLACE) 8.6-50 mg per tablet 2 Tab, 2 Tab, Oral, QHS, Tessy Pro MD, Stopped at 05/07/17 2729    bisacodyl (DULCOLAX) tablet 5 mg, 5 mg, Oral, DAILY PRN, Joy MARINO NAMAN Flynn MD    bisacodyl (DULCOLAX) suppository 10 mg, 10 mg, Rectal, DAILY PRN, Mario Lou MD    pantoprazole (PROTONIX) 40 mg in sodium chloride 0.9 % 10 mL injection, 40 mg, IntraVENous, Q12H, Mario Lou MD, 40 mg at 05/08/17 0931    albuterol (PROVENTIL VENTOLIN) nebulizer solution 2.5 mg, 2.5 mg, Nebulization, Q4H PRN, Mario Lou MD    folic acid (FOLVITE) 1 mg, thiamine (B-1) 100 mg in 0.9% sodium chloride 50 mL ivpb, , IntraVENous, DAILY, Joy Albarado MD        Assessment:   Altered Mental Status, vomiting    Ddx: 1. Stroke             2. Intracranial hemorrhage             3. TIA             4. Brain tumor             5. Hydrocephalous             6. Normal pressure hydrocephalous              7. PE/ DVT/ thromboembolism              8. Seizure             9. 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 MI             12. Hypoglycemia/ Hyperglycemia              13. CKD             14. TBI             15. Hypothyroid/ Hyperthyroid             16. Viral Gastroenteritis, Dehydration             17. Electrolyte disturbance             18. UTI                                              Plan: Altered Mental Status, Vomiting     CBC, CMP, TSH, Ammonia, Lactic Acidosis, glucose, HBA1c   CT head   EKG   Chest x-ray    Urine dip   Toxic screen       Margoth Jacome  5/8/2017  10:09 AM  *ATTENTION:  This note has been created by a medical student for educational purposes only. Please do not refer to the content of this note for clinical decision-making, billing, or other purposes. Please see attending physicians note to obtain clinical information on this patient. *

## 2017-05-08 NOTE — PROGRESS NOTES
[facility]  Hospitalist Division    Daily progress Note    Patient: Rosalio Díaz MRN: [id number]  CSN: [id number]    YOB: 1930  Age: 80 y.o.   Sex: female    DOA: 5/6/2017 LOS:  LOS: 2 days                    Subjective:     CC: ICH     Awake, agitated, intubated   Moving left randomly     Objective:      Visit Vitals    /70    Pulse (!) 108    Temp 98.8 °F (37.1 °C)    Resp 20    Ht 5' 4\" (1.626 m)    Wt 42.7 kg (94 lb 2.2 oz)    SpO2 100%    BMI 16.16 kg/m2       Physical Exam:  NC/AT  NO JVD,TMG  S1/S2 RRR  CTAB, NO WHEEZING  NT,ND, NABS  NO EDEMA  MS 0/5 on Rt, 5/5 on left         Intake and Output:  Current Shift:  05/08 0701 - 05/08 1900  In: 266.4 [I.V.:266.4]  Out: 575 [Urine:575]  Last three shifts:  05/06 1901 - 05/08 0700  In: 4640.1 [I.V.:4410.1]  Out: 2329 [Urine:1909]    Recent Results (from the past 24 hour(s))   PHOSPHORUS    Collection Time: 05/07/17  5:30 PM   Result Value Ref Range    Phosphorus 2.9 2.5 - 4.9 MG/DL   POTASSIUM    Collection Time: 05/07/17  5:30 PM   Result Value Ref Range    Potassium 3.7 3.5 - 5.5 mmol/L   CARDIAC PANEL,(CK, CKMB & TROPONIN)    Collection Time: 05/07/17  5:50 PM   Result Value Ref Range     (H) 26 - 192 U/L    CK - MB 2.9 <3.6 ng/ml    CK-MB Index 0.6 0.0 - 4.0 %    Troponin-I, Qt. 0.05 (H) 0.0 - 0.045 NG/ML   GLUCOSE, POC    Collection Time: 05/07/17  6:07 PM   Result Value Ref Range    Glucose (POC) 169 (H) 70 - 110 mg/dL   GLUCOSE, POC    Collection Time: 05/08/17 12:01 AM   Result Value Ref Range    Glucose (POC) 153 (H) 70 - 110 mg/dL   POTASSIUM    Collection Time: 05/08/17  1:00 AM   Result Value Ref Range    Potassium 4.3 3.5 - 5.5 mmol/L   CALCIUM, IONIZED    Collection Time: 05/08/17  3:50 AM   Result Value Ref Range    Ionized Calcium 0.92 (L) 1.12 - 1.32 MMOL/L   MAGNESIUM    Collection Time: 05/08/17  3:50 AM   Result Value Ref Range    Magnesium 1.8 1.6 - 2.6 mg/dL   PHOSPHORUS Collection Time: 05/08/17  3:50 AM   Result Value Ref Range    Phosphorus 2.0 (L) 2.5 - 4.9 MG/DL   PROTHROMBIN TIME + INR    Collection Time: 05/08/17  3:50 AM   Result Value Ref Range    Prothrombin time 16.4 (H) 11.5 - 15.2 sec    INR 1.4 (H) 0.8 - 1.2     PTT    Collection Time: 05/08/17  3:50 AM   Result Value Ref Range    aPTT 32.8 23.0 - 74.9 SEC   METABOLIC PANEL, BASIC    Collection Time: 05/08/17  3:50 AM   Result Value Ref Range    Sodium 152 (H) 136 - 145 mmol/L    Potassium 4.1 3.5 - 5.5 mmol/L    Chloride 117 (H) 100 - 108 mmol/L    CO2 23 21 - 32 mmol/L    Anion gap 12 3.0 - 18 mmol/L    Glucose 127 (H) 74 - 99 mg/dL    BUN 11 7.0 - 18 MG/DL    Creatinine 1.15 0.6 - 1.3 MG/DL    BUN/Creatinine ratio 10 (L) 12 - 20      GFR est AA 54 (L) >60 ml/min/1.73m2    GFR est non-AA 45 (L) >60 ml/min/1.73m2    Calcium 9.4 8.5 - 10.1 MG/DL   GLUCOSE, POC    Collection Time: 05/08/17  6:09 AM   Result Value Ref Range    Glucose (POC) 158 (H) 70 - 110 mg/dL   MAGNESIUM    Collection Time: 05/08/17 10:45 AM   Result Value Ref Range    Magnesium 2.4 1.6 - 2.6 mg/dL   CBC WITH AUTOMATED DIFF    Collection Time: 05/08/17 10:45 AM   Result Value Ref Range    WBC 22.2 (H) 4.6 - 13.2 K/uL    RBC 3.37 (L) 4.20 - 5.30 M/uL    HGB 10.2 (L) 12.0 - 16.0 g/dL    HCT 30.0 (L) 35.0 - 45.0 %    MCV 89.0 74.0 - 97.0 FL    MCH 30.3 24.0 - 34.0 PG    MCHC 34.0 31.0 - 37.0 g/dL    RDW 14.6 (H) 11.6 - 14.5 %    PLATELET 606 352 - 530 K/uL    MPV 10.8 9.2 - 11.8 FL    NEUTROPHILS 89 (H) 42 - 75 %    LYMPHOCYTES 5 (L) 20 - 51 %    MONOCYTES 6 2 - 9 %    EOSINOPHILS 0 0 - 5 %    BASOPHILS 0 0 - 3 %    ABS. NEUTROPHILS 19.8 (H) 1.8 - 8.0 K/UL    ABS. LYMPHOCYTES 1.1 0.8 - 3.5 K/UL    ABS. MONOCYTES 1.3 (H) 0 - 1.0 K/UL    ABS. EOSINOPHILS 0.0 0.0 - 0.4 K/UL    ABS.  BASOPHILS 0.0 0.0 - 0.1 K/UL    RBC COMMENTS ANISOCYTOSIS  1+        DF MANUAL     GLUCOSE, POC    Collection Time: 05/08/17 12:18 PM   Result Value Ref Range    Glucose (POC) 143 (H) 70 - 110 mg/dL         Current Facility-Administered Medications:     dextrose 5% - 0.9% NaCl with KCl 20 mEq/L infusion, 37 mL/hr, IntraVENous, CONTINUOUS, Ana Maria Aranda MD, Last Rate: 37.4 mL/hr at 05/08/17 0621, 37 mL/hr at 05/08/17 0621    labetalol (NORMODYNE;TRANDATE) 20 mg/4 mL (5 mg/mL) injection 10 mg, 10 mg, IntraVENous, Q4H PRN, YENIFER Wei, 10 mg at 05/08/17 1130    amLODIPine (NORVASC) tablet 10 mg, 10 mg, Oral, DAILY, Vaishali Moore MD, 10 mg at 05/08/17 1430    insulin lispro (HUMALOG) injection, , SubCUTAneous, Q6H, Luis Alfredo Mcqueen MD, Stopped at 05/08/17 1200    glucose chewable tablet 16 g, 4 Tab, Oral, PRN, Luis Alfredo Mcqueen MD    glucagon (GLUCAGEN) injection 1 mg, 1 mg, IntraMUSCular, PRN, Luis Alfredo Mcqueen MD    dextrose (D50W) injection syrg 12.5-25 g, 25-50 mL, IntraVENous, PRN, Luis Alfredo Mcqueen MD    niCARdipine (CARDENE) 25 mg in 0.9% sodium chloride 250 mL infusion, 0-15 mg/hr, IntraVENous, TITRATE, Anya Jacome MD, Stopped at 05/08/17 0104    propofol (DIPRIVAN) infusion, 5-50 mcg/kg/min, IntraVENous, TITRATE, Anya Jacome MD, Stopped at 05/06/17 2230    fentaNYL citrate (PF) injection 50 mcg, 50 mcg, IntraVENous, Q1H PRN, Anya Jacome MD, 50 mcg at 05/06/17 2038    ELECTROLYTE REPLACEMENT PROTOCOL, 1 Each, Other, Guadalupe Melissa MD, 1 Each at 05/07/17 2200    ELECTROLYTE REPLACEMENT PROTOCOL, 1 Each, Other, Guadalupe Melissa MD, 1 Each at 05/08/17 0600    ELECTROLYTE REPLACEMENT PROTOCOL, 1 Each, Other, Guadalupe Melissa MD, 1 Each at 05/08/17 0600    PHARMACY INFORMATION NOTE, 1 Each, Other, DAILY, Luis Alfredo Mcqueen MD, 1 Each at 05/08/17 0600    chlorhexidine (PERIDEX) 0.12 % mouthwash 15 mL, 15 mL, Oral, Q12H, Joy Albarado MD, 15 mL at 05/08/17 0931    ondansetron (ZOFRAN) injection 1 mg, 1 mg, IntraVENous, Q6H PRN, Luis Alfredo Mcqueen MD    acetaminophen (TYLENOL) tablet 650 mg, 650 mg, Oral, Q4H PRN, Kayla Schumacher MD    acetaminophen (TYLENOL) suppository 650 mg, 650 mg, Rectal, Q4H PRN, Kayla Schumacher MD    senna-docusate (PERICOLACE) 8.6-50 mg per tablet 2 Tab, 2 Tab, Oral, QHS, Kayla Schumacher MD, Stopped at 05/07/17 2239    bisacodyl (DULCOLAX) tablet 5 mg, 5 mg, Oral, DAILY PRN, Kayla Schumacher MD    bisacodyl (DULCOLAX) suppository 10 mg, 10 mg, Rectal, DAILY PRN, Kayla Schumacher MD    pantoprazole (PROTONIX) 40 mg in sodium chloride 0.9 % 10 mL injection, 40 mg, IntraVENous, Q12H, Kayla Schumacher MD, 40 mg at 05/08/17 0931    albuterol (PROVENTIL VENTOLIN) nebulizer solution 2.5 mg, 2.5 mg, Nebulization, Q4H PRN, Kayla Schumacher MD    folic acid (FOLVITE) 1 mg, thiamine (B-1) 100 mg in 0.9% sodium chloride 50 mL ivpb, , IntraVENous, DAILY, Kayla Schumacher MD    Lab Results   Component Value Date/Time    Glucose 127 05/08/2017 03:50 AM    Glucose 220 05/07/2017 04:15 AM    Glucose 114 05/06/2017 07:00 PM    Glucose 84 05/03/2017 11:45 AM    Glucose 89 02/16/2017 03:34 PM        Assessment/Plan     Active Problems:    Cerebral hemorrhage ([address] (5/6/2017)       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

## 2017-05-08 NOTE — DIABETES MGMT
NUTRITIONAL ASSESSMENT GLYCEMIC CONTROL/ PLAN OF CARE     Wiley Mejia           80 y.o.           5/6/2017                 1. Spontaneous intraparenchymal intracranial hemorrhage, acute ([address]    2. Hypertensive emergency       INTERVENTIONS/PLAN:   1. If tube feeding needed, suggest Osmolite 1 kaden, initial rate of 20 ml/hr increasing gradually as tolerated to goal rate of 60 ml/hr to provide 1526 calories, 64 grams protein,  1214 ml free water/d.    2.  Will monitor feeding status, labs and weights. ASSESSMENT:   Pt is 78% ideal wt; BMI (calculated): 16.2 kg/m2 (underweight classification). Pt appears thin yet weight is relatively stable since prior admission in 2014. Chart review indicates pt lost weight following her colostomy procedure in 2010 and was not able to gain weight back. Pt is currently on vent/NPO. Labs noted. A1C is in prediabetes range. 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 Diet: NPO - vent    No data found.     Medications: [x]                Reviewed   Pertinent:  Folvite,  Corrective Humalog, normal insulin resistant dosing ACHS  IVF:  D5 NS with KCl 20 mEq at 37.4 ml/hr    Most Recent POC Glucose:   Recent Labs      05/08/17   0350  05/07/17   0415  05/06/17   1900   GLU  127*  220*  114*         Labs:   Lab Results   Component Value Date/Time    Hemoglobin A1c 6.1 05/06/2017 07:00 PM     Lab Results   Component Value Date/Time    Sodium 152 05/08/2017 03:50 AM    Potassium 4.1 05/08/2017 03:50 AM    Chloride 117 05/08/2017 03:50 AM    CO2 23 05/08/2017 03:50 AM    Anion gap 12 05/08/2017 03:50 AM    Glucose 127 05/08/2017 03:50 AM    BUN 11 05/08/2017 03:50 AM    Creatinine 1.15 05/08/2017 03:50 AM    Calcium 9.4 05/08/2017 03:50 AM    Magnesium 2.4 05/08/2017 10:45 AM Phosphorus 2.0 05/08/2017 03:50 AM    Albumin 4.0 05/06/2017 07:00 PM       Anthropometrics: IBW : 54.6 kg (120 lb 5.9 oz), % IBW (Calculated): 78.2 %, BMI (calculated): 16.2  Wt Readings from Last 1 Encounters:   05/08/17 42.7 kg (94 lb 2.2 oz)    12/1/14 weight - 45.4 kg  Wt Readings from Last 3 Encounters:   05/08/17 42.7 kg (94 lb 2.2 oz)   05/03/17 41.2 kg (90 lb 12.8 oz)   03/21/17 41.9 kg (92 lb 6.4 oz)     Ht Readings from Last 1 Encounters:   05/06/17 5' 4\" (1.626 m)       Estimated Nutrition Needs:  6095 Kcals/day,   Fluid (ml): 1500 ml  Based on:   [x]          Actual BW    []          ABW   []            Adjusted BW        Nutrition Diagnoses:   Inadequate oral food and beverage intake due to intubation as evidenced by NPO orders. Underweight due to inadequate energy intake as evidenced by BMI of 16.2 kg/m2 and pt is 78% ideal wt. Nutrition Interventions:   Tube feeding recommendations if needed. Goal:   Blood glucose will be within target range of  mg/dL by 5/11/17. Po intake will meet > 75% estimated energy and protein needs by 5/12/17. Weight maintenance (+/- 1-2 kg) or gradual weight gain of 1-2 lbs by 5/18/17.         Nutrition Monitoring and Evaluation      []     Monitor po intake on meal rounds  [x]     Continue inpatient monitoring and intervention  []     Other:      Nutrition Risk:  [x]   High     []  Moderate    []  Minimal/Uncompromised    Mariela Bashir RD, CDE   Office:  [address] [place] [address] Pager:  [phone]

## 2017-05-08 NOTE — CDMP QUERY
Please clarify if this patient is being treated/managed for:    =>Metabolic encephalopathy related to cerebral hemorrhage    =>Other Explanation of clinical findings  =>Unable to Determine (no explanation of clinical findings)    The medical record reflects the following:    Risk: 79 yo female with PMH HTN, advance age     Clinical Indicators: CT brain with \"There is a large intraparenchymal hemorrhage in the region the left basal ganglia with mass effect and midline shift to the right by 6 mm\"    Treatment: Neuro Consult, ICU monitoring, BP control per meds    Please clarify and document your clinical opinion in the progress notes and discharge summary including the definitive and/or presumptive diagnosis, (suspected or probable), related to the above clinical findings. Please include clinical findings supporting your diagnosis. If you DECLINE this query or would like to communicate with [facility], please utilize the \"[facility] message box\" at the TOP of the Progress Note on the right.       Thank you,  Rigo Tyson RN [facility]  [phone]

## 2017-05-08 NOTE — PROGRESS NOTES
1900- Assumed care of pt. Report received by Monserrat Hernandez. Dual NIHSS completed at this time. Gtts verified. 0104- SBP WDL, Cardene gtt held at this time    0700- Bedside and Verbal shift change report given to LESTER BAILEY RN (oncoming nurse) by Desire Hatfield. Isael Gaviria RN  (offgoing nurse). Report included the following information SBAR, Kardex, ED Summary, Procedure Summary, Intake/Output, MAR, Recent Results and Cardiac Rhythm SR/ST with PVCs. Dual NIHSS completed at this time.

## 2017-05-08 NOTE — PROGRESS NOTES
Patient in bed on back with head elevated - BBS equal and sl coarse - patient suctioned and RAMON tube cleared - ETT secure at 22 at the lips and moved to the left side of the mouth - MVB at [facility] - vent alarms on and functional    0024 - patient suctioned and RAMON tube cleared - ETT moved to the right side of the mouth    0227 - bite block placed with no adverse effects noted    0501 - patient orally suctioned and RAMON tube cleared - water cleared from circuit - ETT moved to the left side of the mouth - no AM ABG due to no SBT plans because of neuro status

## 2017-05-08 NOTE — PROGRESS NOTES
0700 - Report received from Margoth Sotomayor [facility]. Orders, medications, labs, and skin reviewed. 1100 - Singh catheter removed. Pure wick applied to pt.   1130 - PRN 10 mg IV labetalol given per order for SBP > 140 sustained. 1800 - PRN 10 mg IV labetalol given per order for SBP > 140 sustained. 1900 - Report given to Margoth Sotomayor RN. Orders, medications, labs, and skin reviewed. Pt incontinent every hour since singh removal. Pt required multiple rounds of incontinence care and frequent repositioning and restraint management.

## 2017-05-08 NOTE — PROGRESS NOTES
Patient is unable to communicate at this time as she is currently on full life support and sedated. [facility] offered prayer. Patient was seen around 0935 this morning. Chaplains will continue to follow and will provide pastoral care on an as needed/requested basis.     Nader Dumont   Spiritual Care   [phone]

## 2017-05-08 NOTE — PROGRESS NOTES
Problem: Ventilator Management  Goal: *Adequate oxygenation and ventilation  Pt. Intubated s/p cerebral hemorrhage     Current ventilator Settings- ACVC+ 12 VT-350, PEEP-5, FIO2-30%     ABG-5/6/17- 2150-pH-7.48, PCO2-45.2, PO2-564, HCO3-33.7, SO2-100     Xray-5/7/1735-2125-Ickedmz spaces appear clear. No abnormal pulmonary opacities.      Plan: Maintain ventilatory support     Goal: Adequate oxygenation, ventilation

## 2017-05-09 PROBLEM — R41.82 ALTERED MENTAL STATUS: Status: ACTIVE | Noted: 2017-01-01

## 2017-05-09 PROBLEM — R11.10 VOMITING: Status: ACTIVE | Noted: 2017-01-01

## 2017-05-09 PROBLEM — F03.90 DEMENTIA (HCC): Status: ACTIVE | Noted: 2017-01-01

## 2017-05-09 NOTE — PROGRESS NOTES
Neurology Progress Note    Admit Date: 5/6/2017  Length of Stay: 3  Primary Care: Armando Vazquez., DO       Assessment:    Principle Problem: <principal problem not specified>     Problem List: Active Problems:    Cerebral hemorrhage ([address] (5/6/2017)      Dementia (5/9/2017)      Vomiting (5/9/2017)      Altered mental status (5/9/2017)        Plan:    Large Left ICH:  Neurosurgery evaluated and did no think there was a surgical role. ICH score suggests 97% mortality. Discussed with her daughter the significant residual disability the patient would be left with even if she survived and could be extubated. 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 The daughter said she spoke with palliative care for 2 hours about all of this as well. Family is still deciding on plans. Discussed with Dr. Letitia Rosario. Getting CT head to evaluate for worsening hydrocephalus. Interim History: This is an 80year old female with a large left intracerebral hemorrhage with ventricular extension. Baseline prior to ICH: Had poor baseline vision and had not driven or paid her own bills in years due to her vision. Had short term memory problems for about a year but was walking without a cane and living independently without assistance. She would walk to the grocery store on her own. Her family had been concerned about her and hired a home health aid who she dismissed because she valued her independence. Results reviewed:   CT Head  Large left ICH      Discussed with: Dr. Letitia Rosario    Allergies:    Allergies   Allergen Reactions    Penicillins Not Reported This Time         Vital Signs:   Visit Vitals    /75    Pulse 81    Temp 97.8 °F (36.6 °C)    Resp 18    Ht 5' 4\" (1.626 m)    Wt 39.4 kg (86 lb 13.8 oz)    SpO2 100%    BMI 14.91 kg/m2        Neurological examination:    Neuro: + VORs, surgical pupil on the right, pinpoint on the left. Moves head side to side spontaneously. opens eyes to noxious stimulus. Moves left arm and leg to noxious stimuli. Right dense hemiplegia. Does not blink to VT.        Review of Systems: unable to obtain          PMH:   Past Medical History:   Diagnosis Date    ACP (advance care planning) 11/17/2016    Anemia NEC     [facility] ([facility] 4/23/2010    Colon polyps     Descending colon; Sigmoid colon X2    Diverticulosis     Gastric polyps     GERD (gastroesophageal reflux disease)     Glaucoma 4/23/2010    Hypertension 4/23/2010    Paget's bone disease 12/13/2013    Thyroid disease       FH:   Family History   Problem Relation Age of Onset    Heart Attack Father     Diabetes Mother       SH:   Social History     Social History    Marital status:      Spouse name: N/A    Number of children: N/A    Years of education: N/A     Social History Main Topics    Smoking status: Former Smoker     Packs/day: 0.25     Years: 10.00     Quit date: 1992    Smokeless tobacco: Never Used    Alcohol use No    Drug use: No    Sexual activity: Not Asked     Other Topics Concern    None     Social History Narrative          Medications:    [x] REVIEWED  Current Facility-Administered Medications   Medication Dose Route Frequency    niCARdipine (CARDENE) 25 mg in 0.9% sodium chloride 250 mL infusion  0-15 mg/hr IntraVENous TITRATE    dextrose 5% - 0.9% NaCl with KCl 20 mEq/L infusion  37 mL/hr IntraVENous CONTINUOUS    labetalol (NORMODYNE;TRANDATE) 20 mg/4 mL (5 mg/mL) injection 10 mg  10 mg IntraVENous Q4H PRN    amLODIPine (NORVASC) tablet 10 mg  10 mg Oral DAILY    insulin lispro (HUMALOG) injection   SubCUTAneous Q6H    glucose chewable tablet 16 g  4 Tab Oral PRN    glucagon (GLUCAGEN) injection 1 mg  1 mg IntraMUSCular PRN    dextrose (D50W) injection syrg 12.5-25 g  25-50 mL IntraVENous PRN  fentaNYL citrate (PF) injection 50 mcg  50 mcg IntraVENous Q1H PRN    ELECTROLYTE REPLACEMENT PROTOCOL  1 Each Other Q8H    ELECTROLYTE REPLACEMENT PROTOCOL  1 Each Other Q8H    ELECTROLYTE REPLACEMENT PROTOCOL  1 Each Other Q8H    PHARMACY INFORMATION NOTE  1 Each Other DAILY    chlorhexidine (PERIDEX) 0.12 % mouthwash 15 mL  15 mL Oral Q12H    ondansetron (ZOFRAN) injection 1 mg  1 mg IntraVENous Q6H PRN    acetaminophen (TYLENOL) tablet 650 mg  650 mg Oral Q4H PRN    acetaminophen (TYLENOL) suppository 650 mg  650 mg Rectal Q4H PRN    senna-docusate (PERICOLACE) 8.6-50 mg per tablet 2 Tab  2 Tab Oral QHS    bisacodyl (DULCOLAX) tablet 5 mg  5 mg Oral DAILY PRN    bisacodyl (DULCOLAX) suppository 10 mg  10 mg Rectal DAILY PRN    pantoprazole (PROTONIX) 40 mg in sodium chloride 0.9 % 10 mL injection  40 mg IntraVENous Q12H    albuterol (PROVENTIL VENTOLIN) nebulizer solution 2.5 mg  2.5 mg Nebulization X3G PRN    folic acid (FOLVITE) 1 mg, thiamine (B-1) 100 mg in 0.9% sodium chloride 50 mL ivpb   IntraVENous DAILY      Data:      [x] REVIEWED  Recent Results (from the past 24 hour(s))   POTASSIUM    Collection Time: 05/08/17  9:10 PM   Result Value Ref Range    Potassium 3.6 3.5 - 5.5 mmol/L   PHOSPHORUS    Collection Time: 05/08/17  9:10 PM   Result Value Ref Range    Phosphorus 2.0 (L) 2.5 - 4.9 MG/DL   MAGNESIUM    Collection Time: 05/08/17  9:10 PM   Result Value Ref Range    Magnesium 2.3 1.6 - 2.6 mg/dL   GLUCOSE, POC    Collection Time: 05/08/17 11:59 PM   Result Value Ref Range    Glucose (POC) 174 (H) 70 - 110 mg/dL   AMMONIA    Collection Time: 05/09/17  3:35 AM   Result Value Ref Range    Ammonia 32 11 - 32 UMOL/L   AMYLASE    Collection Time: 05/09/17  3:35 AM   Result Value Ref Range    Amylase 315 (H) 25 - 115 U/L   CALCIUM, IONIZED    Collection Time: 05/09/17  3:35 AM   Result Value Ref Range    Ionized Calcium 1.19 1.12 - 1.32 MMOL/L   HEPATIC FUNCTION PANEL    Collection Time: 05/09/17  3:35 AM   Result Value Ref Range    Protein, total 6.9 6.4 - 8.2 g/dL    Albumin 3.4 3.4 - 5.0 g/dL    Globulin 3.5 2.0 - 4.0 g/dL    A-G Ratio 1.0 0.8 - 1.7      Bilirubin, total 0.9 0.2 - 1.0 MG/DL    Bilirubin, direct 0.2 0.0 - 0.2 MG/DL    Alk. phosphatase 61 45 - 117 U/L    AST (SGOT) 38 (H) 15 - 37 U/L    ALT (SGPT) 27 13 - 56 U/L   LIPASE    Collection Time: 05/09/17  3:35 AM   Result Value Ref Range    Lipase 202 73 - 393 U/L   MAGNESIUM    Collection Time: 05/09/17  3:35 AM   Result Value Ref Range    Magnesium 2.3 1.6 - 2.6 mg/dL   PHOSPHORUS    Collection Time: 05/09/17  3:35 AM   Result Value Ref Range    Phosphorus 2.4 (L) 2.5 - 4.9 MG/DL   PROTHROMBIN TIME + INR    Collection Time: 05/09/17  3:35 AM   Result Value Ref Range    Prothrombin time 15.6 (H) 11.5 - 15.2 sec    INR 1.3 (H) 0.8 - 1.2     PTT    Collection Time: 05/09/17  3:35 AM   Result Value Ref Range    aPTT 36.0 23.0 - 36.4 SEC   CBC WITH AUTOMATED DIFF    Collection Time: 05/09/17  3:35 AM   Result Value Ref Range    WBC 18.7 (H) 4.6 - 13.2 K/uL    RBC 3.42 (L) 4.20 - 5.30 M/uL    HGB 10.3 (L) 12.0 - 16.0 g/dL    HCT 30.4 (L) 35.0 - 45.0 %    MCV 88.9 74.0 - 97.0 FL    MCH 30.1 24.0 - 34.0 PG    MCHC 33.9 31.0 - 37.0 g/dL    RDW 14.8 (H) 11.6 - 14.5 %    PLATELET 459 200 - 136 K/uL    MPV 10.7 9.2 - 11.8 FL    NEUTROPHILS 89 (H) 40 - 73 %    LYMPHOCYTES 3 (L) 21 - 52 %    MONOCYTES 8 3 - 10 %    EOSINOPHILS 0 0 - 5 %    BASOPHILS 0 0 - 2 %    ABS. NEUTROPHILS 16.5 (H) 1.8 - 8.0 K/UL    ABS. LYMPHOCYTES 0.6 (L) 0.9 - 3.6 K/UL    ABS. MONOCYTES 1.6 (H) 0.05 - 1.2 K/UL    ABS. EOSINOPHILS 0.0 0.0 - 0.4 K/UL    ABS.  BASOPHILS 0.0 0.0 - 0.06 K/UL    DF AUTOMATED     METABOLIC PANEL, BASIC    Collection Time: 05/09/17  3:35 AM   Result Value Ref Range    Sodium 151 (H) 136 - 145 mmol/L    Potassium 3.5 3.5 - 5.5 mmol/L    Chloride 116 (H) 100 - 108 mmol/L    CO2 24 21 - 32 mmol/L    Anion gap 11 3.0 - 18 mmol/L    Glucose 144 (H) 74 - 99 mg/dL    BUN 11 7.0 - 18 MG/DL    Creatinine 1.03 0.6 - 1.3 MG/DL    BUN/Creatinine ratio 11 (L) 12 - 20      GFR est AA >60 >60 ml/min/1.73m2    GFR est non-AA 51 (L) >60 ml/min/1.73m2    Calcium 9.7 8.5 - 10.1 MG/DL   GLUCOSE, POC    Collection Time: 05/09/17  5:32 AM   Result Value Ref Range    Glucose (POC) 136 (H) 70 - 110 mg/dL   GLUCOSE, POC    Collection Time: 05/09/17 12:02 PM   Result Value Ref Range    Glucose (POC) 145 (H) 70 - 110 mg/dL

## 2017-05-09 NOTE — PROGRESS NOTES
Problem: Ventilator Management  Goal: *Adequate oxygenation and ventilation  Pt. Intubated s/p cerebral hemorrhage      Current ventilator Settings- ACVC+ 12 VT-350, PEEP-5, FIO2-30%      ABG-5/6/17- 2150-pH-7.48, PCO2-45.2, PO2-564, HCO3-33.7, SO2-100      Xray-5/7/1784-2281-Fexfdyv spaces appear clear. No abnormal pulmonary opacities.       Plan: Maintain ventilatory support      Goal: Adequate oxygenation, ventilation

## 2017-05-09 NOTE — PROGRESS NOTES
Bedside and Verbal shift change report given to Lisy Zacarias (oncoming nurse) by Christine Martin. Sujata Triplett RN  (offgoing nurse). Report included the following information SBAR, Kardex, ED Summary, Intake/Output, MAR, Recent Results and Cardiac Rhythm SR/ST . Dual NIHSS completed at this time. Gtts verified.

## 2017-05-09 NOTE — PROGRESS NOTES
0715-Received patient on ventilator ACVC+ 12, VT-350, PEEP-5, FIO2-30%. O2 Sats-100% HR-105, RR-19. No Respiratory distress noted. ETT noted to be patent and secure. Respiratory will continue to monitor. -[facility]    -0050-Pt. Transported and returned without complications via transport ventilator. Pt. Returned to above settings O2 Sats 100% HR-74, RR-14. ETT remains patent and secure.  No SBT this shift per MD.-[facility]

## 2017-05-09 NOTE — MED STUDENT NOTES
*ATTENTION:  This note has been created by a medical student for educational purposes only. Please do not refer to the content of this note for clinical decision-making, billing, or other purposes. Please see attending physicians note to obtain clinical information on this patient. *       Student Progress Note  Please refer to attendings daily rounding note for full details      Patient: Roman Ledesma MRN: [id number]  CSN: [id number]    YOB: 1930  Age: 80 y.o. Sex: female    DOA: 5/6/2017 LOS:  LOS: 3 days          Chief Complaint:  Altered mental status    Subjective:    Pt is unconscious but no longer sedated. She appears to be resting comfortably, is intubated and on ventilator      Objective:      Visit Vitals    /65    Pulse 93    Temp 97.8 °F (36.6 °C)    Resp 20    Ht 5' 4\" (1.626 m)    Wt 39.4 kg (86 lb 13.8 oz)    SpO2 100%    BMI 14.91 kg/m2         Physical Exam:  Gen: intubated and unconscious, NAD  HEENT: normocephalic, nontraumatic, trachea midline  CV: RRR x4, no murmurs, rubs or gallops  Resp: CTA x12, ventilator, no wheezing, rales or rhonchi   Abd: non distended, normal bowel sounds, no organomegaly  Extrem:  No peripheral edema ain lower extremities bilaterally   Skin: normal texture, color and torgur  Neuro: unconscious, responds to painful stimulus, not able to open eyes on command      I have reviewed the patient's Labs and Radiology studies.   Lab Results   Component Value Date/Time    WBC 18.7 05/09/2017 03:35 AM    WBC 6.5 05/30/2012 10:17 AM    Hemoglobin (POC) 9.2 04/07/2010 05:19 PM    HGB 10.3 05/09/2017 03:35 AM    Hematocrit (POC) 27 04/07/2010 05:19 PM    HCT 30.4 05/09/2017 03:35 AM    PLATELET 412 37/78/9028 03:35 AM    MCV 88.9 05/09/2017 03:35 AM     Lab Results   Component Value Date/Time    Sodium 151 05/09/2017 03:35 AM    Potassium 3.5 05/09/2017 03:35 AM    Chloride 116 05/09/2017 03:35 AM    CO2 24 05/09/2017 03:35 AM    Anion gap 11 05/09/2017 03:35 AM    Glucose 144 05/09/2017 03:35 AM    BUN 11 05/09/2017 03:35 AM    Creatinine 1.03 05/09/2017 03:35 AM    BUN/Creatinine ratio 11 05/09/2017 03:35 AM    GFR est AA >60 05/09/2017 03:35 AM    GFR est non-AA 51 05/09/2017 03:35 AM    Calcium 9.7 05/09/2017 03:35 AM    Bilirubin, total 0.9 05/09/2017 03:35 AM    AST (SGOT) 38 05/09/2017 03:35 AM    Alk.  phosphatase 61 05/09/2017 03:35 AM    Protein, total 6.9 05/09/2017 03:35 AM    Albumin 3.4 05/09/2017 03:35 AM    Globulin 3.5 05/09/2017 03:35 AM    A-G Ratio 1.0 05/09/2017 03:35 AM    ALT (SGPT) 27 05/09/2017 03:35 AM       Intake/Output Summary (Last 24 hours) at 05/09/17 1102  Last data filed at 05/09/17 0900   Gross per 24 hour   Intake          1388.82 ml   Output              250 ml   Net          1138.82 ml       Current Facility-Administered Medications:     potassium chloride 10 mEq in 100 ml IVPB, 10 mEq, IntraVENous, Q1H, Keven Harrison MD, Last Rate: 100 mL/hr at 05/09/17 1022, 10 mEq at 05/09/17 1022    potassium, sodium phosphates (NEUTRA-PHOS) packet 2 Packet, 2 Packet, Oral, Q2H, Keven Harrison MD, 2 Packet at 05/09/17 1024    dextrose 5% - 0.9% NaCl with KCl 20 mEq/L infusion, 37 mL/hr, IntraVENous, CONTINUOUS, Keven Harrison MD, Last Rate: 37.4 mL/hr at 05/09/17 0843, 37 mL/hr at 05/09/17 0843    labetalol (NORMODYNE;TRANDATE) 20 mg/4 mL (5 mg/mL) injection 10 mg, 10 mg, IntraVENous, Q4H PRN, YENIFER Gaston, 10 mg at 05/08/17 1754    amLODIPine (NORVASC) tablet 10 mg, 10 mg, Oral, DAILY, Jaxson Gardner MD, 10 mg at 05/09/17 0844    insulin lispro (HUMALOG) injection, , SubCUTAneous, Q6H, Coleen Roach MD, Stopped at 05/09/17 0600    glucose chewable tablet 16 g, 4 Tab, Oral, PRN, Coleen Roach MD    glucagon (GLUCAGEN) injection 1 mg, 1 mg, IntraMUSCular, PRN, Coleen Roach MD    dextrose (D50W) injection syrg 12.5-25 g, 25-50 mL, IntraVENous, PRN, Coleen Roach MD    niCARdipine (CARDENE) 25 mg in 0.9% sodium chloride 250 mL infusion, 0-15 mg/hr, IntraVENous, TITRATE, Lee Tatum MD, Stopped at 05/09/17 6607    propofol (DIPRIVAN) infusion, 5-50 mcg/kg/min, IntraVENous, TITRATE, Lee Tatum MD, Stopped at 05/06/17 2230    fentaNYL citrate (PF) injection 50 mcg, 50 mcg, IntraVENous, Q1H PRN, Lee Tatum MD, 50 mcg at 05/06/17 2038    ELECTROLYTE REPLACEMENT PROTOCOL, 1 Each, Other, Purvi Kumar MD, 1 Each at 05/09/17 0600    ELECTROLYTE REPLACEMENT PROTOCOL, 1 Each, Other, Purvi Kumar MD, 1 Each at 05/08/17 0600    ELECTROLYTE REPLACEMENT PROTOCOL, 1 Each, Other, Purvi Kumar MD, 1 Each at 05/09/17 0600    PHARMACY INFORMATION NOTE, 1 Each, Other, Анна Crespo MD, 1 Each at 05/09/17 0600    chlorhexidine (PERIDEX) 0.12 % mouthwash 15 mL, 15 mL, Oral, Q12H, Hua Valerio MD, 15 mL at 05/09/17 0844    ondansetron (ZOFRAN) injection 1 mg, 1 mg, IntraVENous, Q6H PRN, Hua Valerio MD    acetaminophen (TYLENOL) tablet 650 mg, 650 mg, Oral, Q4H PRN, Hua Valerio MD    acetaminophen (TYLENOL) suppository 650 mg, 650 mg, Rectal, Q4H PRN, Hua Valerio MD    senna-docusate (PERICOLACE) 8.6-50 mg per tablet 2 Tab, 2 Tab, Oral, QHS, Hua Valerio MD, Stopped at 05/07/17 2239    bisacodyl (DULCOLAX) tablet 5 mg, 5 mg, Oral, DAILY PRN, Hua Valerio MD    bisacodyl (DULCOLAX) suppository 10 mg, 10 mg, Rectal, DAILY PRN, Hua Valerio MD    pantoprazole (PROTONIX) 40 mg in sodium chloride 0.9 % 10 mL injection, 40 mg, IntraVENous, Q12H, Hua Valerio MD, 40 mg at 05/09/17 0844    albuterol (PROVENTIL VENTOLIN) nebulizer solution 2.5 mg, 2.5 mg, Nebulization, Q4H PRN, Hua Valerio MD    folic acid (FOLVITE) 1 mg, thiamine (B-1) 100 mg in 0.9% sodium chloride 50 mL ivpb, , IntraVENous, DAILY, Joy Albarado MD        Assessment:   Altered mental status with known intracranial hemorrhage Other Ddx:   hyperkalemia/ NELLIE  Hyperammoniemia  UTI  Hydrocephalous           Plan: Altered Mental Status:   repeat CT head to see if stable or progressing neuro changes   CBC, CMP, ammonia, urine dip       Radha LO Naa  5/9/2017  11:02 AM  *ATTENTION:  This note has been created by a medical student for educational purposes only. Please do not refer to the content of this note for clinical decision-making, billing, or other purposes. Please see attending physicians note to obtain clinical information on this patient. *

## 2017-05-09 NOTE — PROGRESS NOTES
[facility]  ICU Progress Note      Name: Geri Bernard   : 1930   MRN: [id number]   Date: 2017 11:29 AM     [x]I have reviewed the flowsheet and previous days notes. Events overnight reviewed and discussed with nursing staff. Vital signs and records reviewed. 2017  No overnight events  No new changes  Palliative care to meet with family  Moving L side spont, WD RLE  Afebrile      [x]The patient is unable to give any meaningful history or review of systems because the patient is:  [x]Intubated []Sedated   []Unresponsive      [x]The patient is critically ill on      [x]Mechanical ventilation []Pressors   []BiPAP []                 ROS:Review of systems not obtained due to patient factors.     Medication Review:  · Pressors -  · Sedation -  · Antibiotics -  · Pain -  · GI/ DVT -protonix  · Others (other gtts)    Safety Bundles: VAP Bundle/ CAUTI/ Severe Sepsis Protocol/ Electrolyte Replacement Protocol    Vital Signs:    Visit Vitals    /65    Pulse (!) 105    Temp 97.8 °F (36.6 °C)    Resp 17    Ht 5' 4\" (1.626 m)    Wt 39.4 kg (86 lb 13.8 oz)    SpO2 100%    BMI 14.91 kg/m2       O2 Device: Ventilator       Temp (24hrs), Av.8 °F (37.1 °C), Min:97.8 °F (36.6 °C), Max:99.3 °F (37.4 °C)       Intake/Output:   Last shift:      701 - 1900  In: 237.7 [I.V.:117.7]  Out: 250   Last 3 shifts: 1901 - 700  In: 2549.3 [I.V.:2489.3]  Out: 1705 [Urine:1605]    Intake/Output Summary (Last 24 hours) at 17 1140  Last data filed at 17 0900   Gross per 24 hour   Intake          1388.82 ml   Output              250 ml   Net          1138.82 ml       Ventilator Settings:  Ventilator  Mode: Assist control  Respiratory Rate  Back-Up Rate: 12  Insp Time (sec): 0.9 sec  I:E Ratio: 1:2.0  Ventilator Volumes  Vt Set (ml): 350 ml  Vt Exhaled (Machine Breath) (ml): 416 ml  Ve Observed (l/min): 9.31 l/min  Ventilator Pressures  PIP Observed (cm H2O): 15 cm H2O  Plateau Pressure (cm H2O): 13 cm H2O  MAP (cm H2O): 7  PEEP/VENT (cm H2O): 5 cm H20  Auto PEEP Observed (cm H2O):  (unable to obtain)    Physical Exam:    General/Neuro: Intubated, off sedation, moves L UE/LE spontaneously, opens eyes to NS, WD RLE, L pupil pinpoint  HEENT:  Anicteric sclerae; ETT in place  Resp:  Symmetrical chest expansion, adequate airway entry; no rales/ wheezing/ rhonchi noted  CV:  Regular, no m  GI:  Abdomen soft, non-tender; (+) active bowel sounds, +colostomy  Extremities:  +2 pulses on all extremities; SCD in place  Skin:  Warm; no rashes/ lesions noted    DATA:     Current Facility-Administered Medications   Medication Dose Route Frequency    potassium chloride 10 mEq in 100 ml IVPB  10 mEq IntraVENous Q1H    potassium, sodium phosphates (NEUTRA-PHOS) packet 2 Packet  2 Packet Oral Q2H    dextrose 5% - 0.9% NaCl with KCl 20 mEq/L infusion  37 mL/hr IntraVENous CONTINUOUS    labetalol (NORMODYNE;TRANDATE) 20 mg/4 mL (5 mg/mL) injection 10 mg  10 mg IntraVENous Q4H PRN    amLODIPine (NORVASC) tablet 10 mg  10 mg Oral DAILY    insulin lispro (HUMALOG) injection   SubCUTAneous Q6H    glucose chewable tablet 16 g  4 Tab Oral PRN    glucagon (GLUCAGEN) injection 1 mg  1 mg IntraMUSCular PRN    dextrose (D50W) injection syrg 12.5-25 g  25-50 mL IntraVENous PRN    niCARdipine (CARDENE) 25 mg in 0.9% sodium chloride 250 mL infusion  0-15 mg/hr IntraVENous TITRATE    fentaNYL citrate (PF) injection 50 mcg  50 mcg IntraVENous Q1H PRN    ELECTROLYTE REPLACEMENT PROTOCOL  1 Each Other Q8H    ELECTROLYTE REPLACEMENT PROTOCOL  1 Each Other Q8H    ELECTROLYTE REPLACEMENT PROTOCOL  1 Each Other Q8H    PHARMACY INFORMATION NOTE  1 Each Other DAILY    chlorhexidine (PERIDEX) 0.12 % mouthwash 15 mL  15 mL Oral Q12H    ondansetron (ZOFRAN) injection 1 mg  1 mg IntraVENous Q6H PRN    acetaminophen (TYLENOL) tablet 650 mg  650 mg Oral Q4H PRN    acetaminophen (TYLENOL) suppository 650 mg  650 mg Rectal Q4H PRN    senna-docusate (PERICOLACE) 8.6-50 mg per tablet 2 Tab  2 Tab Oral QHS    bisacodyl (DULCOLAX) tablet 5 mg  5 mg Oral DAILY PRN    bisacodyl (DULCOLAX) suppository 10 mg  10 mg Rectal DAILY PRN    pantoprazole (PROTONIX) 40 mg in sodium chloride 0.9 % 10 mL injection  40 mg IntraVENous Q12H    albuterol (PROVENTIL VENTOLIN) nebulizer solution 2.5 mg  2.5 mg Nebulization H7S PRN    folic acid (FOLVITE) 1 mg, thiamine (B-1) 100 mg in 0.9% sodium chloride 50 mL ivpb   IntraVENous DAILY         Labs: Results:       Chemistry Recent Labs      05/09/17 0335 05/08/17 2110 05/08/17   0350 05/07/17   0415  05/06/17   1900   GLU  144*   --   127*   --   220*  114*   NA  151*   --   152*   --   146*  144   K  3.5  3.6  4.1   < >  4.0  3.7   CL  116*   --   117*   --   109*  104   CO2  24   --   23   --   23  31   BUN  11   --   11   --   14  16   CREA  1.03   --   1.15   --   1.19  1.22   CA  9.7   --   9.4   --   9.4  9.9   AGAP  11   --   12   --   14  9   BUCR  11*   --   10*   --   12  13   AP  61   --    --    --    --   70   TP  6.9   --    --    --    --   8.1   ALB  3.4   --    --    --    --   4.0   GLOB  3.5   --    --    --    --   4.1*   AGRAT  1.0   --    --    --    --   1.0    < > = values in this interval not displayed. CBC w/Diff Recent Labs      05/09/17 0335 05/08/17   1045  05/06/17   1900   WBC  18.7*  22.2*  9.6   RBC  3.42*  3.37*  4.05*   HGB  10.3*  10.2*  12.4   HCT  30.4*  30.0*  36.6   PLT  162  153  176   GRANS  89*  89*  62   LYMPH  3*  5*  31   EOS  0  0  1      Coagulation Recent Labs      05/09/17   0335  05/08/17   0350   PTP  15.6*  16.4*   INR  1.3*  1.4*   APTT  36.0  32.8       Liver Enzymes Recent Labs      05/09/17   0335   TP  6.9   ALB  3.4   AP  61   SGOT  38*      ABG No results found for: PH, PHI, PCO2, PCO2I, PO2, PO2I, HCO3, HCO3I, FIO2, FIO2I   Microbiology No results for input(s): CULT in the last 72 hours. Telemetry: [x]Sinus tach []A-flutter []Paced    []A-fib []Multiple PVCs                    Imaging:  []I have personally reviewed the patients radiographs  []Radiographs reviewed with radiologist   []No change from prior, tubes and lines in adequate position  []Improved   []Worsening    5/7/17 CXR   Findings: Adequately positioned endotracheal tube. Nasogastric tube extends into  the stomach. Stable cardiomediastinal silhouette. Pleural spaces appear clear. No abnormal pulmonary opacities. No acute osseous abnormality.     IMPRESSION  IMPRESSION:      Adequately positioned support devices    5/6/17 CT head    There is a large intraparenchymal hemorrhage in the region the left basal  ganglia with mass effect and midline shift to the right by 6 mm. There is  significant extension of this hemorrhage into the ventricular system. There is  dilatation of the right lateral ventricle. Significant small vessel ischemic  disease seen.       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   - Neurosurgery and neurology consulted  - No plans for surgical intervention-poor prognosis per NS  - BP control  - ICH orderset  · Pulm: VDRF due to above  - No plans for SBT due to low GCS  - Vent orderset  - CXR prn  · CV: HTN  - SBP goal<140  - On norvasc  - Currently off cardene gtt, labetalol prn  - Cont IVF  · GI  - Continue protonix  - No TF for now  · Renal/Metabolic-hypomagnesemia  - Replace lytes per protocol  ·   - cont purewick or straight cath prn for now, singh can be replaced 5/10 if needed  · ID  - monitor temps, leukocytosis improved        PLAN:   · Prophylaxis - DVT-SCD, GI-protonix  · Discussed in interdisciplinary rounds  · FULL CODE (see ACP on file)  · Palliative care following-family meeting arranged          The patient is: [] acutely ill Risk of deterioration: [] moderate    [x] critically ill  [x] high     [x]See my orders for details    My assessment/plan was discussed with:  [x]nursing []PT/OT    []respiratory therapy [x]   []family []         YENIFER Richey

## 2017-05-09 NOTE — PROGRESS NOTES
[facility]  Hospitalist Division    Daily progress Note    Patient: Rosalio Díaz MRN: [id number]  CSN: [id number]    YOB: 1930  Age: 80 y.o.   Sex: female    DOA: 5/6/2017 LOS:  LOS: 3 days                    Subjective:     CC: ICH   Pt remains intubated   Awake a little agitated  Moving left ext randomly     Objective:      Visit Vitals    /57    Pulse 78    Temp 97.8 °F (36.6 °C)    Resp 18    Ht 5' 4\" (1.626 m)    Wt 39.4 kg (86 lb 13.8 oz)    SpO2 98%    BMI 14.91 kg/m2       Physical Exam:  NC/AT  NO JVD,TMG  S1/S2 RRR  CTAB, NO WHEEZING  NT,ND, NABS  NO EDEMA  MS 0/5 on Rt, 5/5 on left         Intake and Output:  Current Shift:  05/09 0701 - 05/09 1900  In: 762.1 [I.V.:642.1]  Out: 250   Last three shifts:  05/07 1901 - 05/09 0700  In: 2549.3 [I.V.:2489.3]  Out: 1705 [Urine:1605]    Recent Results (from the past 24 hour(s))   POTASSIUM    Collection Time: 05/08/17  9:10 PM   Result Value Ref Range    Potassium 3.6 3.5 - 5.5 mmol/L   PHOSPHORUS    Collection Time: 05/08/17  9:10 PM   Result Value Ref Range    Phosphorus 2.0 (L) 2.5 - 4.9 MG/DL   MAGNESIUM    Collection Time: 05/08/17  9:10 PM   Result Value Ref Range    Magnesium 2.3 1.6 - 2.6 mg/dL   GLUCOSE, POC    Collection Time: 05/08/17 11:59 PM   Result Value Ref Range    Glucose (POC) 174 (H) 70 - 110 mg/dL   AMMONIA    Collection Time: 05/09/17  3:35 AM   Result Value Ref Range    Ammonia 32 11 - 32 UMOL/L   AMYLASE    Collection Time: 05/09/17  3:35 AM   Result Value Ref Range    Amylase 315 (H) 25 - 115 U/L   CALCIUM, IONIZED    Collection Time: 05/09/17  3:35 AM   Result Value Ref Range    Ionized Calcium 1.19 1.12 - 1.32 MMOL/L   HEPATIC FUNCTION PANEL    Collection Time: 05/09/17  3:35 AM   Result Value Ref Range    Protein, total 6.9 6.4 - 8.2 g/dL    Albumin 3.4 3.4 - 5.0 g/dL    Globulin 3.5 2.0 - 4.0 g/dL    A-G Ratio 1.0 0.8 - 1.7      Bilirubin, total 0.9 0.2 - 1.0 MG/DL Bilirubin, direct 0.2 0.0 - 0.2 MG/DL    Alk. phosphatase 61 45 - 117 U/L    AST (SGOT) 38 (H) 15 - 37 U/L    ALT (SGPT) 27 13 - 56 U/L   LIPASE    Collection Time: 05/09/17  3:35 AM   Result Value Ref Range    Lipase 202 73 - 393 U/L   MAGNESIUM    Collection Time: 05/09/17  3:35 AM   Result Value Ref Range    Magnesium 2.3 1.6 - 2.6 mg/dL   PHOSPHORUS    Collection Time: 05/09/17  3:35 AM   Result Value Ref Range    Phosphorus 2.4 (L) 2.5 - 4.9 MG/DL   PROTHROMBIN TIME + INR    Collection Time: 05/09/17  3:35 AM   Result Value Ref Range    Prothrombin time 15.6 (H) 11.5 - 15.2 sec    INR 1.3 (H) 0.8 - 1.2     PTT    Collection Time: 05/09/17  3:35 AM   Result Value Ref Range    aPTT 36.0 23.0 - 36.4 SEC   CBC WITH AUTOMATED DIFF    Collection Time: 05/09/17  3:35 AM   Result Value Ref Range    WBC 18.7 (H) 4.6 - 13.2 K/uL    RBC 3.42 (L) 4.20 - 5.30 M/uL    HGB 10.3 (L) 12.0 - 16.0 g/dL    HCT 30.4 (L) 35.0 - 45.0 %    MCV 88.9 74.0 - 97.0 FL    MCH 30.1 24.0 - 34.0 PG    MCHC 33.9 31.0 - 37.0 g/dL    RDW 14.8 (H) 11.6 - 14.5 %    PLATELET 664 631 - 303 K/uL    MPV 10.7 9.2 - 11.8 FL    NEUTROPHILS 89 (H) 40 - 73 %    LYMPHOCYTES 3 (L) 21 - 52 %    MONOCYTES 8 3 - 10 %    EOSINOPHILS 0 0 - 5 %    BASOPHILS 0 0 - 2 %    ABS. NEUTROPHILS 16.5 (H) 1.8 - 8.0 K/UL    ABS. LYMPHOCYTES 0.6 (L) 0.9 - 3.6 K/UL    ABS. MONOCYTES 1.6 (H) 0.05 - 1.2 K/UL    ABS. EOSINOPHILS 0.0 0.0 - 0.4 K/UL    ABS.  BASOPHILS 0.0 0.0 - 0.06 K/UL    DF AUTOMATED     METABOLIC PANEL, BASIC    Collection Time: 05/09/17  3:35 AM   Result Value Ref Range    Sodium 151 (H) 136 - 145 mmol/L    Potassium 3.5 3.5 - 5.5 mmol/L    Chloride 116 (H) 100 - 108 mmol/L    CO2 24 21 - 32 mmol/L    Anion gap 11 3.0 - 18 mmol/L    Glucose 144 (H) 74 - 99 mg/dL    BUN 11 7.0 - 18 MG/DL    Creatinine 1.03 0.6 - 1.3 MG/DL    BUN/Creatinine ratio 11 (L) 12 - 20      GFR est AA >60 >60 ml/min/1.73m2    GFR est non-AA 51 (L) >60 ml/min/1.73m2    Calcium 9.7 8.5 - 10.1 MG/DL   GLUCOSE, POC    Collection Time: 05/09/17  5:32 AM   Result Value Ref Range    Glucose (POC) 136 (H) 70 - 110 mg/dL   GLUCOSE, POC    Collection Time: 05/09/17 12:02 PM   Result Value Ref Range    Glucose (POC) 145 (H) 70 - 110 mg/dL   GLUCOSE, POC    Collection Time: 05/09/17  5:27 PM   Result Value Ref Range    Glucose (POC) 145 (H) 70 - 110 mg/dL         Current Facility-Administered Medications:     niCARdipine (CARDENE) 25 mg in 0.9% sodium chloride 250 mL infusion, 0-15 mg/hr, IntraVENous, TITRATE, YNEIFER Cruz, Stopped at 05/09/17 1200    dextrose 5% - 0.9% NaCl with KCl 20 mEq/L infusion, 37 mL/hr, IntraVENous, CONTINUOUS, Lacy Perez MD, Last Rate: 37.4 mL/hr at 05/09/17 1725, 37 mL/hr at 05/09/17 1725    labetalol (NORMODYNE;TRANDATE) 20 mg/4 mL (5 mg/mL) injection 10 mg, 10 mg, IntraVENous, Q4H PRN, YENIFER Cruz, 10 mg at 05/09/17 1708    amLODIPine (NORVASC) tablet 10 mg, 10 mg, Oral, DAILY, Tanisha Oleary MD, 10 mg at 05/09/17 0844    insulin lispro (HUMALOG) injection, , SubCUTAneous, Q6H, Bailey Mitchell MD, Stopped at 05/09/17 0600    glucose chewable tablet 16 g, 4 Tab, Oral, PRN, Bailey Mitchell MD    glucagon (GLUCAGEN) injection 1 mg, 1 mg, IntraMUSCular, PRN, Bailey Mitchell MD    dextrose (D50W) injection syrg 12.5-25 g, 25-50 mL, IntraVENous, PRN, Bailey Mitchell MD    fentaNYL citrate (PF) injection 50 mcg, 50 mcg, IntraVENous, Q1H PRN, Nneka Mari MD, 50 mcg at 05/06/17 2038    ELECTROLYTE REPLACEMENT PROTOCOL, 1 Each, Other, Silverio Sexton MD, 1 Each at 05/09/17 1400    ELECTROLYTE REPLACEMENT PROTOCOL, 1 Each, Other, Silverio Sexton MD, 1 Each at 05/09/17 1400    ELECTROLYTE REPLACEMENT PROTOCOL, 1 Each, Other, Silverio Sexton MD, 1 Each at 05/09/17 1400    PHARMACY INFORMATION NOTE, 1 Each, Other, DAILY, Bailey Mitchell MD, 1 Each at 05/09/17 0600    chlorhexidine (PERIDEX) 0.12 % mouthwash 15 mL, 15 mL, Oral, Q12H, Lucina Chambers MD, 15 mL at 05/09/17 0844    ondansetron (ZOFRAN) injection 1 mg, 1 mg, IntraVENous, Q6H PRN, Lucina Chambers MD    acetaminophen (TYLENOL) tablet 650 mg, 650 mg, Oral, Q4H PRN, Lucina Chambers MD    acetaminophen (TYLENOL) suppository 650 mg, 650 mg, Rectal, Q4H PRN, Lucina Chambers MD    senna-docusate (PERICOLACE) 8.6-50 mg per tablet 2 Tab, 2 Tab, Oral, QHS, Lucina Chambers MD, Stopped at 05/07/17 2239    bisacodyl (DULCOLAX) tablet 5 mg, 5 mg, Oral, DAILY PRN, Lucina Chambers MD    bisacodyl (DULCOLAX) suppository 10 mg, 10 mg, Rectal, DAILY PRN, Lucina Chambers MD    pantoprazole (PROTONIX) 40 mg in sodium chloride 0.9 % 10 mL injection, 40 mg, IntraVENous, Q12H, Lucina Chambers MD, 40 mg at 05/09/17 0844    albuterol (PROVENTIL VENTOLIN) nebulizer solution 2.5 mg, 2.5 mg, Nebulization, Q4H PRN, Lucina Chambers MD    folic acid (FOLVITE) 1 mg, thiamine (B-1) 100 mg in 0.9% sodium chloride 50 mL ivpb, , IntraVENous, DAILY, Lucina Chambers MD    Lab Results   Component Value Date/Time    Glucose 144 05/09/2017 03:35 AM    Glucose 127 05/08/2017 03:50 AM    Glucose 220 05/07/2017 04:15 AM    Glucose 114 05/06/2017 07:00 PM    Glucose 84 05/03/2017 11:45 AM        Assessment/Plan     Active Problems:    Cerebral hemorrhage ([facility] [address] (5/6/2017)      Dementia (5/9/2017)      Vomiting (5/9/2017)      Altered mental status (5/9/2017)       Left basal ganglia ICH with a 6 mm midline shift  Neurosurgery rec no intervention   Neuro consulted, ?  Repeat CT of head   On Norvasc and PRN Labetalol with SBP goal of <140  Monitor off cardene gtt  Metabolic encephalopathy related to cerebral hemorrhage  Resp failure 2/2 above   HTN  Hypothyroidism   Hypernatremia             Pratibha Easton MD  5/9/2017, 4:55 PM

## 2017-05-09 NOTE — PROGRESS NOTES
[facility]– [place]: [phone] ([phone])  [facility]: [phone]   [facility]: [phone]    Patient Name: Jacinto Morrison  YOB: 1930    Date of Initial Consult: 5-8-17  Reason for Consult: Care Decisions  Requesting Provider: Dr Patricio Dias   Primary Care Physician: Ricardo Puri, DO      SUMMARY:   Jacinto Morrison is a 80y.o. year old with a past history of colon cancer w/colostomy, hypertension, hypothyroidism, CKD stage 3,  who was admitted on 5/6/2017 from ER/Home with a diagnosis of AMS and  weakness, found to have CVA. Current medical issues leading to Palliative Medicine involvement include: patient was intubated in ER, imaging revealed left sided hemorrhage centered in the basal ganglia with significant interventricular extension and left to right shift, asked to support family to establish goals of care. PALLIATIVE DIAGNOSES:   1. Altered Mental Status  2. Vomiting  3. ICH  4. Dementia       PLAN:   1. Eliane Rinne NP and I met with the patient, she is intubated, did not follow commands, did not open eyes, RN shared that she is withdrawing from pain on right. Per her dtr Gretchen Oconnor and her son in law patient is  x 15 yrs, has 3 children, dtr in [place], son in [place] and Gretchen Oconnor in [place], [place]. A 4th child/son passed in 10-16, has 6 grandchildren, had many siblings, all passed except for brother in [place]. She did work in [employer], former smoker. 2. AMS-patient had sudden onset of imbalance and then was not herself, very sedated. Explained to dtr and son in law that she had a large hemorrhage in her brain that was the cause of her AMS and now the weakness on the right dominant side. 3. Vomiting-explained that with the injury to the brain there is often complaints of head ache and sometime nausea and vomiting.  Explained that she was intubated to protect her airway, that it did not appear that she was unable to breath, but was believed to be too altered to keep her airway patent especially when she was having emesis. 4. ICH-family had seen the images of the intracerebral bleed and were explained that bleeding can be related to advanced age and underlying HTN but that Neurosurgery has assessed her and did not recommend any interventions. They were told the opinion of the Neurosurgeon, that her prognosis was poor, that a neurologist would also be making an assessment and this could be reviewed tomorrow. 5. Dementia-family shared that patient had been having some short term memory loss for about 2 yrs, that she lacked insight into her own limitations, wanting to live alone, refusing outside hired help but was unable to do her IADLS-cleaning, shopping, bills, appointments. She asked the same questions repeatedly. Shared that given her underlying dementia her stroke was likely going to cause more deficits than for those with normal brain functioning. 6. Goals of Care and Code Status-dtr is quite sad, she lost her brother in [place] 2016, a best friend and now her mother is critically ill, states she is in [place] place. \" Her mother was fiercely independent, never wanted to be a burden, had her own senior apt and refused outside help-would \"fire\" all the aids dtr sent to help. Dtr was aware that patient had completed an AMD-that named her to be mPOA for her mother, and that it stated she would not want heroics at EOL, would not want to be INTUBATED or kept alive with feeding tubes or dialysis. Dtr is struggling to understand how a stroke that patient was able to survive, after all Dossie Range is still alive and if there is life than there is hope, that it may not be as catastrophic, that she may be able to recover. \" Lengthy conversation with dtr about deficits she is apt to suffer, that it is such a large stroke that even if she can breath without a machine we are not sure if she can protect her airway, nor be able to eat/drink safely, nor walk or talk, that if she wishes to continue with aggressive care she needs to be prepared for these outcomes. We discussed the patient's AMD that states she did not want to live on machines, did not want heroics, nor feeding tubes. Dtr intends to speak with her sister, she was counseled to consider a PARTIAL CODE-to not do CPR or SHOCKS at this time, but she is not ready to make this decision, so code remains FULL CODE. We offered to meet through the week, that by day 7 of intubation, if there is no improvement then we will discuss TRACH and PEG, which dtr believes patient would not want. 7. Initial consult note routed to primary continuity provider  8. Communicated plan of care with: Palliative IDT, ICU team, Attending. GOALS OF CARE:     [====Goals of Care====]  GOALS OF CARE:  Patient / health care proxy stated goals:       TREATMENT PREFERENCES:   Code Status: Full Code    Advance Care Planning:  Advance Care Planning 5/7/2017   Patient's Healthcare Decision Maker is: Named in scanned ACP document   Primary Decision Maker Name -   Primary Decision Maker Phone Number -   Confirm Advance Directive Yes, not on file       Other:    The palliative care team has discussed with patient / health care proxy about goals of care / treatment preferences for patient.  [====Goals of Care====]    Advance Care Planning 5/7/2017   Patient's Healthcare Decision Maker is: Named in scanned ACP document   Primary Decision Maker Name -   Primary Decision Maker Phone Number -   Confirm Advance Directive Yes, not on file      Stef [phone]     HISTORY:     History obtained from: Chart    CHIEF COMPLAINT: AMS and Vomiting with weakness    HPI/SUBJECTIVE:  97.8, 93,140/65, 20 intubated at 100%, wt 86lb. Khanna 1129, Colostomy 120ml.    MAR-Nebs, Norvasc, Folvite, Labetalol, Cardene, Protonix, Pericolace, has not rec'd any Propofol or Fentanyl  LABS-WBC 18.7, H&H 10.3 & 30.4, Plat 162, INR 1.3, BUN/Creat 11/1.03, Ammonia 32, HgbA1c 6.1, Albumin 3.4, TSH 0.88  Head CT-There is a large intraparenchymal hemorrhage in the region the left basal  ganglia with mass effect and midline shift to the right by 6 mm. There is  significant extension of this hemorrhage into the ventricular system. There is  dilatation of the right lateral ventricle. Significant small vessel ischemic  disease seen  CXR-  Adequately positioned support devices.           Neurosurgery consult-dominant hemisphere large basil ganglia hemorrhage with interventricular extension  PLAN:This is all assuming the setting of dementia and colon cancer. This carries a very poor prognosis for any quality of life. Neurosurgical intervention would not improve the outcome. Discussions are being had with the power of   The patient is:   [] Verbal and participatory  [x] Non-participatory due to:   intubated    Clinical Pain Assessment (nonverbal scale for nonverbal patients):    Resting comfortably-no evidence of pain       FUNCTIONAL ASSESSMENT:     Palliative Performance Scale (PPS):10%          PSYCHOSOCIAL/SPIRITUAL SCREENING:     Advance Care Planning:  Advance Care Planning 5/7/2017   Patient's Healthcare Decision Maker is: Named in scanned ACP document   Primary Decision Maker Name -   Primary Decision Maker Phone Number -   Confirm Advance Directive Yes, not on file        Any spiritual / Anglican concerns:  [] Yes /  [x] No    Caregiver Burnout:  [] Yes /  [x] No /  [] No Caregiver Present      Anticipatory grief assessment:   [x] Normal  / [] Maladaptive       ESAS Anxiety:      ESAS Depression:          REVIEW OF SYSTEMS:     Positive and pertinent negative findings in ROS are noted above in HPI. The following systems were [] reviewed / [x] unable to be reviewed as noted in HPI  Other findings are noted below.   Systems: constitutional, ears/nose/mouth/throat, respiratory, gastrointestinal, genitourinary, musculoskeletal, integumentary, neurologic, psychiatric, endocrine. Positive findings noted below. Modified ESAS Completed by: provider                                            PHYSICAL EXAM:     Wt Readings from Last 3 Encounters:   05/09/17 39.4 kg (86 lb 13.8 oz)   05/03/17 41.2 kg (90 lb 12.8 oz)   03/21/17 41.9 kg (92 lb 6.4 oz)     Blood pressure 143/68, pulse 91, temperature 97.8 °F (36.6 °C), resp. rate 20, height 5' 4\" (1.626 m), weight 39.4 kg (86 lb 13.8 oz), SpO2 100 %. Pain:  Pain Scale 1: Adult Nonverbal Pain Scale  Pain Intensity 1: 0                 Last bowel movement: colostomy 120 ml    Constitutional: eyes closed, did not open for voice/touch, did not follow commands, intubated         HISTORY:     Active Problems:    Cerebral hemorrhage ([facility] [address] (5/6/2017)      Past Medical History:   Diagnosis Date    ACP (advance care planning) 11/17/2016    Anemia NEC     Colon cancer ([facility] [address] 4/23/2010    Colon polyps     Descending colon; Sigmoid colon X2    Diverticulosis     Gastric polyps     GERD (gastroesophageal reflux disease)     Glaucoma 4/23/2010    Hypertension 4/23/2010    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   Social History   Substance Use Topics    Smoking status: Former Smoker     Packs/day: 0.25     Years: 10.00     Quit date: 1992    Smokeless tobacco: Never Used    Alcohol use No     Allergies   Allergen Reactions    Penicillins Not Reported This Time      Current Facility-Administered Medications   Medication Dose Route Frequency    potassium chloride 10 mEq in 100 ml IVPB  10 mEq IntraVENous Q1H    potassium, sodium phosphates (NEUTRA-PHOS) packet 2 Packet  2 Packet Oral Q2H    dextrose 5% - 0.9% NaCl with KCl 20 mEq/L infusion  37 mL/hr IntraVENous CONTINUOUS    labetalol (NORMODYNE;TRANDATE) 20 mg/4 mL (5 mg/mL) injection 10 mg  10 mg IntraVENous Q4H PRN    amLODIPine (NORVASC) tablet 10 mg  10 mg Oral DAILY    insulin lispro (HUMALOG) injection   SubCUTAneous Q6H    glucose chewable tablet 16 g  4 Tab Oral PRN    glucagon (GLUCAGEN) injection 1 mg  1 mg IntraMUSCular PRN    dextrose (D50W) injection syrg 12.5-25 g  25-50 mL IntraVENous PRN    niCARdipine (CARDENE) 25 mg in 0.9% sodium chloride 250 mL infusion  0-15 mg/hr IntraVENous TITRATE    propofol (DIPRIVAN) infusion  5-50 mcg/kg/min IntraVENous TITRATE    fentaNYL citrate (PF) injection 50 mcg  50 mcg IntraVENous Q1H PRN    ELECTROLYTE REPLACEMENT PROTOCOL  1 Each Other Q8H    ELECTROLYTE REPLACEMENT PROTOCOL  1 Each Other Q8H    ELECTROLYTE REPLACEMENT PROTOCOL  1 Each Other Q8H    PHARMACY INFORMATION NOTE  1 Each Other DAILY    chlorhexidine (PERIDEX) 0.12 % mouthwash 15 mL  15 mL Oral Q12H    ondansetron (ZOFRAN) injection 1 mg  1 mg IntraVENous Q6H PRN    acetaminophen (TYLENOL) tablet 650 mg  650 mg Oral Q4H PRN    acetaminophen (TYLENOL) suppository 650 mg  650 mg Rectal Q4H PRN    senna-docusate (PERICOLACE) 8.6-50 mg per tablet 2 Tab  2 Tab Oral QHS    bisacodyl (DULCOLAX) tablet 5 mg  5 mg Oral DAILY PRN    bisacodyl (DULCOLAX) suppository 10 mg  10 mg Rectal DAILY PRN    pantoprazole (PROTONIX) 40 mg in sodium chloride 0.9 % 10 mL injection  40 mg IntraVENous Q12H    albuterol (PROVENTIL VENTOLIN) nebulizer solution 2.5 mg  2.5 mg Nebulization P5L PRN    folic acid (FOLVITE) 1 mg, thiamine (B-1) 100 mg in 0.9% sodium chloride 50 mL ivpb   IntraVENous DAILY        LAB AND IMAGING FINDINGS:     Lab Results   Component Value Date/Time    WBC 18.7 05/09/2017 03:35 AM    HGB 10.3 05/09/2017 03:35 AM    PLATELET 303 22/75/1543 03:35 AM     Lab Results Component Value Date/Time    Sodium 151 05/09/2017 03:35 AM    Potassium 3.5 05/09/2017 03:35 AM    Chloride 116 05/09/2017 03:35 AM    CO2 24 05/09/2017 03:35 AM    BUN 11 05/09/2017 03:35 AM    Creatinine 1.03 05/09/2017 03:35 AM    Calcium 9.7 05/09/2017 03:35 AM    Magnesium 2.3 05/09/2017 03:35 AM    Phosphorus 2.4 05/09/2017 03:35 AM      Lab Results   Component Value Date/Time    AST (SGOT) 38 05/09/2017 03:35 AM    Alk. phosphatase 61 05/09/2017 03:35 AM    Protein, total 6.9 05/09/2017 03:35 AM    Albumin 3.4 05/09/2017 03:35 AM    Globulin 3.5 05/09/2017 03:35 AM    GGT 33 12/09/2015 02:06 PM     Lab Results   Component Value Date/Time    INR 1.3 05/09/2017 03:35 AM    Prothrombin time 15.6 05/09/2017 03:35 AM    aPTT 36.0 05/09/2017 03:35 AM      Lab Results   Component Value Date/Time    Iron 29 06/06/2016 11:47 AM    TIBC 217 06/06/2016 11:47 AM    Iron % saturation 13 06/06/2016 11:47 AM    Ferritin 140 05/03/2017 11:45 AM      No results found for: PH, PCO2, PO2  No components found for: [place]   Lab Results   Component Value Date/Time     05/07/2017 05:50 PM    CK - MB 2.9 05/07/2017 05:50 PM              Total time: 100  Counseling / coordination time, spent as noted above: 70  > 50% counseling / coordination?: yes with dtr and son in law    Prolonged service was provided for  [x]30 min   []75 min in face to face time in the presence of the patient, spent as noted above. Time Start: 1:pmTime End: 2:40 pm  Note: this can only be billed with  (initial) or 21 [phone] (follow up). If multiple start / stop times, list each separately.

## 2017-05-10 NOTE — MANAGEMENT PLAN
Discharge Plan    Care Management following for now. If pt survives, may need trach and PEG. Palliative working with family.       Alina Magallanes RN BSN  Outcomes Manager  Pager # [phone]

## 2017-05-10 NOTE — ROUTINE PROCESS
0710- Bedside shift change report given to Jon Thornton 108 (oncoming nurse) by Payal Moncada (offgoing nurse). Report included the following information SBAR, MAR and Cardiac Rhythm NSR/Sinus Tach. 200- Dr. Jonnathan Boyer made aware of NIH stroke scale up to 28 from 24. Patient has no new neurological deficits, scoring different for sensory and alertness. No further orders. 1410- Bedside shift change report given to 1700 Lorena Love (oncoming nurse) by Tracie Kraft (offgoing nurse). Report included the following information SBAR, Intake/Output, Recent Results and Cardiac Rhythm Sinus/Sinus Tach.

## 2017-05-10 NOTE — PROGRESS NOTES
-0715-Received patient on ventilator ACVC+ 12, VT-350, PEEP-5, FIO2-30% O2 Sats-100% HR-100, RR-25. ETT noted to be  Patent and secure. Respiratory will continue to monitor. -[address]    -2219- Pt. Placed  On SBT PS-7, PEEP-5, FIO2-30%. O2 Sats-100% HR-101, RR-22. RSBI-43. Respiratory will continue to monitor. -[facility]    -1454-SBT ended at this time and pt. Placed on full ventilatory support. per Maddy Maldonado PA-C  Extubation on hold as decisions to be made by family. O2 sats 99% HR-90, RR--18.-DM    -1600-Pt. Remains on ventilator ACVC+ 12, VT-350, PEEP-5, FIO2-30%. O2 Sats-100% HR-87, RR-17 ETT remains patent and secure.  [address]

## 2017-05-10 NOTE — PROGRESS NOTES
1930 Bedside and Verbal shift change report given to Jared Triplett RN   (oncoming nurse) by Samreen Wood RN (offgoing nurse). Report included the following information SBAR, Procedure Summary, Intake/Output, MAR and Recent Results. 2130 Tube feeding started at 10ml/hr, will continue to monitor. Pericolace held, pt having loose stools. 2240 PRN labetalol given to maintain SBP goal, will continue to monitor. 200 Cardene started to maintain SBP goal. Will continue to monitor. 0100 IV infusing cardene infiltrated, IV removed and replaced with #22 PIV in Lt forearm, cardene continued. 0214 Cardene stopped to remain in SBP goals. 8292 PRN labetalol provided for SBP over 140. Will continue to monitor. 0710 Bedside and Verbal shift change report given to Author Jone RN (oncoming nurse) by Waqar Boyle RN (offgoing nurse). Report included the following information SBAR, Procedure Summary, Intake/Output, MAR and Recent Results.

## 2017-05-10 NOTE — PROGRESS NOTES
Neurology Progress Note    Admit Date: 5/6/2017  Length of Stay: 4  Primary Care: iWlly Padron., DO       Assessment:    Principle Problem: <principal problem not specified>     Problem List: Active Problems:    Cerebral hemorrhage ([address] (5/6/2017)      Dementia (5/9/2017)      Vomiting (5/9/2017)      Altered mental status (5/9/2017)        Assessment / Plan:    Large Left BG ICH  Neurosurgery evaluated and did not feel they had a role. Repeat CT head shows some modest improvement in B.G. hemorrhage size and more hemorrhage in the ventricles now.  -I again do not think there is a reasonable chance for independent living. If she can survive the hospital course I think there will certainly be right sided paralysis and possibly language impairment. No antiplatelets. DVT prophylaxis. Interim History: no clinical changes overnight. Had head CT which showed a modest improvement in hemorrhage. Results reviewed:  CT Head 5/10/2017  Large intraparenchymal hemorrhage centered in the left basal ganglia,  slightly improved from the prior study, with improved right to left midline  shift. Persistent intraventricular extension with improvement in the left  lateral ventricle and increased/new hemorrhage in the right ventricle. A  possible small new areas of subarachnoid hemorrhage. Allergies: Allergies   Allergen Reactions    Penicillins Not Reported This Time         Vital Signs:   Visit Vitals    /78    Pulse 92    Temp 97.7 °F (36.5 °C)    Resp 18    Ht 5' 4\" (1.626 m)    Wt 39.2 kg (86 lb 6.7 oz)    SpO2 100%    BMI 14.83 kg/m2        Neurological examination:   · Neuro: Not following commands. + VORs, surgical pupil on the right, pinpoint on the left. Moves head side to side spontaneously. opens eyes to noxious stimulus. Moves left arm and leg to noxious stimuli. Right dense hemiplegia. Does not blink to VT.        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    Thyroid disease       FH:   Family History   Problem Relation Age of Onset    Heart Attack Father     Diabetes Mother       SH:   Social History     Social History    Marital status:      Spouse name: N/A    Number of children: N/A    Years of education: N/A     Social History Main Topics    Smoking status: Former Smoker     Packs/day: 0.25     Years: 10.00     Quit date: 1992    Smokeless tobacco: Never Used    Alcohol use No    Drug use: No    Sexual activity: Not Asked     Other Topics Concern    None     Social History Narrative          Medications:    [x] REVIEWED  Current Facility-Administered Medications   Medication Dose Route Frequency    dextrose 5% - 0.9% NaCl with KCl 40 mEq/L infusion   IntraVENous CONTINUOUS    niCARdipine (CARDENE) 25 mg in 0.9% sodium chloride 250 mL infusion  0-15 mg/hr IntraVENous TITRATE    labetalol (NORMODYNE;TRANDATE) 20 mg/4 mL (5 mg/mL) injection 10 mg  10 mg IntraVENous Q4H PRN    amLODIPine (NORVASC) tablet 10 mg  10 mg Oral DAILY    insulin lispro (HUMALOG) injection   SubCUTAneous Q6H    glucose chewable tablet 16 g  4 Tab Oral PRN    glucagon (GLUCAGEN) injection 1 mg  1 mg IntraMUSCular PRN    dextrose (D50W) injection syrg 12.5-25 g  25-50 mL IntraVENous PRN    fentaNYL citrate (PF) injection 50 mcg  50 mcg IntraVENous Q1H PRN    ELECTROLYTE REPLACEMENT PROTOCOL  1 Each Other Q8H    ELECTROLYTE REPLACEMENT PROTOCOL  1 Each Other Q8H    ELECTROLYTE REPLACEMENT PROTOCOL  1 Each Other Q8H    PHARMACY INFORMATION NOTE  1 Each Other DAILY    chlorhexidine (PERIDEX) 0.12 % mouthwash 15 mL  15 mL Oral Q12H    ondansetron (ZOFRAN) injection 1 mg  1 mg IntraVENous Q6H PRN    acetaminophen (TYLENOL) tablet 650 mg  650 mg Oral Q4H PRN    acetaminophen (TYLENOL) suppository 650 mg  650 mg Rectal Q4H PRN    senna-docusate (PERICOLACE) 8.6-50 mg per tablet 2 Tab  2 Tab Oral QHS    bisacodyl (DULCOLAX) tablet 5 mg  5 mg Oral DAILY PRN    bisacodyl (DULCOLAX) suppository 10 mg  10 mg Rectal DAILY PRN    pantoprazole (PROTONIX) 40 mg in sodium chloride 0.9 % 10 mL injection  40 mg IntraVENous Q12H    albuterol (PROVENTIL VENTOLIN) nebulizer solution 2.5 mg  2.5 mg Nebulization C6J PRN    folic acid (FOLVITE) 1 mg, thiamine (B-1) 100 mg in 0.9% sodium chloride 50 mL ivpb   IntraVENous DAILY      Data:      [x] REVIEWED  Recent Results (from the past 24 hour(s))   TYPE & SCREEN    Collection Time: 05/09/17  4:22 PM   Result Value Ref Range    Crossmatch Expiration 05/12/2017     ABO/Rh(D) B POSITIVE     Antibody screen NEG    PHOSPHORUS    Collection Time: 05/09/17  4:22 PM   Result Value Ref Range    Phosphorus 2.8 2.5 - 4.9 MG/DL   POTASSIUM    Collection Time: 05/09/17  4:22 PM   Result Value Ref Range    Potassium 4.1 3.5 - 5.5 mmol/L   GLUCOSE, POC    Collection Time: 05/09/17  5:27 PM   Result Value Ref Range    Glucose (POC) 145 (H) 70 - 110 mg/dL   GLUCOSE, POC    Collection Time: 05/10/17 12:07 AM   Result Value Ref Range    Glucose (POC) 150 (H) 70 - 110 mg/dL   CALCIUM, IONIZED    Collection Time: 05/10/17  3:40 AM   Result Value Ref Range    Ionized Calcium 1.19 1.12 - 1.32 MMOL/L   MAGNESIUM    Collection Time: 05/10/17  3:40 AM   Result Value Ref Range    Magnesium 2.3 1.6 - 2.6 mg/dL   PHOSPHORUS    Collection Time: 05/10/17  3:40 AM   Result Value Ref Range    Phosphorus 3.0 2.5 - 4.9 MG/DL   PROTHROMBIN TIME + INR    Collection Time: 05/10/17  3:40 AM   Result Value Ref Range    Prothrombin time 14.8 11.5 - 15.2 sec    INR 1.2 0.8 - 1.2     PTT    Collection Time: 05/10/17  3:40 AM   Result Value Ref Range    aPTT 33.9 23.0 - 38.2 SEC   METABOLIC PANEL, BASIC    Collection Time: 05/10/17  3:40 AM   Result Value Ref Range    Sodium 153 (H) 136 - 145 mmol/L    Potassium 4.0 3.5 - 5.5 mmol/L    Chloride 118 (H) 100 - 108 mmol/L    CO2 23 21 - 32 mmol/L    Anion gap 12 3.0 - 18 mmol/L    Glucose 142 (H) 74 - 99 mg/dL    BUN 12 7.0 - 18 MG/DL    Creatinine 1.03 0.6 - 1.3 MG/DL    BUN/Creatinine ratio 12 12 - 20      GFR est AA >60 >60 ml/min/1.73m2    GFR est non-AA 51 (L) >60 ml/min/1.73m2    Calcium 9.7 8.5 - 10.1 MG/DL   CBC WITH AUTOMATED DIFF    Collection Time: 05/10/17  3:40 AM   Result Value Ref Range    WBC 16.5 (H) 4.6 - 13.2 K/uL    RBC 3.68 (L) 4.20 - 5.30 M/uL    HGB 11.2 (L) 12.0 - 16.0 g/dL    HCT 33.1 (L) 35.0 - 45.0 %    MCV 89.9 74.0 - 97.0 FL    MCH 30.4 24.0 - 34.0 PG    MCHC 33.8 31.0 - 37.0 g/dL    RDW 14.8 (H) 11.6 - 14.5 %    PLATELET 031 121 - 313 K/uL    MPV 10.9 9.2 - 11.8 FL    NEUTROPHILS 89 (H) 40 - 73 %    LYMPHOCYTES 4 (L) 21 - 52 %    MONOCYTES 7 3 - 10 %    EOSINOPHILS 0 0 - 5 %    BASOPHILS 0 0 - 2 %    ABS. NEUTROPHILS 14.7 (H) 1.8 - 8.0 K/UL    ABS. LYMPHOCYTES 0.6 (L) 0.9 - 3.6 K/UL    ABS. MONOCYTES 1.1 0.05 - 1.2 K/UL    ABS. EOSINOPHILS 0.0 0.0 - 0.4 K/UL    ABS.  BASOPHILS 0.0 0.0 - 0.06 K/UL    DF AUTOMATED     GLUCOSE, POC    Collection Time: 05/10/17  6:14 AM   Result Value Ref Range    Glucose (POC) 160 (H) 70 - 110 mg/dL   POTASSIUM    Collection Time: 05/10/17 10:25 AM   Result Value Ref Range    Potassium 4.1 3.5 - 5.5 mmol/L   GLUCOSE, POC    Collection Time: 05/10/17 12:12 PM   Result Value Ref Range    Glucose (POC) 180 (H) 70 - 110 mg/dL   POC G3    Collection Time: 05/10/17  1:22 PM   Result Value Ref Range    Device: VENT      FIO2 (POC) 30 %    pH (POC) 7.466 (H) 7.35 - 7.45      pCO2 (POC) 30.9 (LL) 35 - 45 MMHG    pO2 (POC) 149 (H) 80 - 100 MMHG    HCO3 (POC) 22.4 22 - 26 MMOL/L    sO2 (POC) 99 (H) 92 - 97 %    Base deficit (POC) 1 mmol/L    Mode CPAP/SPON PEEP/CPAP (POC) 5 cmH2O    Pressure support 7 cmH2O    Allens test (POC) YES      Total resp. rate 15      Site LEFT RADIAL      Patient temp.  97.7      Specimen type (POC) ARTERIAL      Performed by Anthony Roberts

## 2017-05-10 NOTE — ROUTINE PROCESS
Bedside and Verbal shift change report given to Angelika Rosario RN  (oncoming nurse) by Bryan Fuller RN (offgoing nurse). Report included the following information SBAR, Kardex, Intake/Output, MAR, Recent Results and Med Rec Status.

## 2017-05-10 NOTE — PROGRESS NOTES
[facility]: [phone]  [facility]: [phone]   [facility]: [phone]    Patient Name: Kushal Thompson  YOB: 1930    Date of Initial Consult: 5-8-17  Reason for Consult: Care Decisions  Requesting Provider: Dr Robina Dalton   Primary Care Physician: Puja Escobedo DO      SUMMARY:   Kushal Thompson is a 80y.o. year old with a past history of colon cancer w/colostomy, hypertension, hypothyroidism, CKD stage 3,  who was admitted on 5/6/2017 from ER/Home with a diagnosis of AMS and  weakness, found to have CVA. Current medical issues leading to Palliative Medicine involvement include: patient was intubated in ER, imaging revealed left sided hemorrhage centered in the basal ganglia with significant interventricular extension and left to right shift, asked to support family to establish goals of care. PALLIATIVE DIAGNOSES:   1. Altered Mental Status  2. Vomiting  3. ICH  4. Dementia       PLAN:   1. Kaveh Hassan and I met with the patient, she is intubated, and still unable to follow commands, dtr called and requested we meet today at 2pm, to include the dtr Shravan Saul who lives in [place] over the phone. Reviewed with Shravan Saul (over the phone)and Analy Donaldson the present situation, that the CVA was large and that Neurologist wrote \"do not think there is a reasonable chance for independent living. If she can survive the hospital course I think there will certainly be right sided paralysis and possibly language impairment. \" We talked at length about patient's wishes, to live as independent as possible, that she would not want to be dependent on others and certainly not on machines. Shravan Saul is aware that Analy Donaldson is the mPOA and offered to be an active listener, but did not offer any opinions or input into what she felt patient would want done.   2. AMS-no sign of improvements at this point, dtr Analy Donaldson seems to have more realistic understanding, she is not expressing thoughts that patient just needs to time to improve. 3. Vomiting-explained that with the injury to the brain there is often complaints of head ache and sometime nausea and vomiting. Explained that she was intubated to protect her airway, that it did not appear that she was unable to breath, but was believed to be too altered to keep her airway patent especially when she was having emesis. 4. ICH-family had seen the images of the intracerebral bleed and were explained that bleeding can be related to advanced age and underlying HTN but that Neurosurgery has assessed her and did not recommend any interventions. They were told the opinion of the Neurosurgeon, that her prognosis was poor, that a neurologist would also be making an assessment and this could be reviewed tomorrow. 5. Dementia-family shared that patient had been having some short term memory loss for about 2 yrs, that she lacked insight into her own limitations, wanting to live alone, refusing outside hired help but was unable to do her IADLS-cleaning, shopping, bills, appointments. She asked the same questions repeatedly. Shared that given her underlying dementia her stroke was likely going to cause more deficits than for those with normal brain functioning. 6. Goals of Care and Code Status- code remains FULL CODE, dtr Pj Jasso agrees that patient did not want to be on any machines nor have such heroics if her life were going to be as limited as predicted, but she can't bring herself to give the order for PARTIAL CODE. She had hoped the AMD, done by the patient was enough and that she would not need to be giving the consent nor signing any paperwork but we explained it was not an order, just a recommendation to her mPOA for what she wanted.  Dtr believes she will not have any cardiac events, but we explained that she is elderly and the stress of a large CVA can cause other consequences, that if she should have cardiac arrest or arrhythmia than a partial DNR would be appropriate to prevent her from having CPR and SHOCKS and allow her a natural passing. Dtr agrees that this would be best but again, she can't bring herself to make his decision. We told her that after 7 days intubated, if there is no significant change ie no improvement, the ICU would want her to decide to either move forward with more aggressive interventions if necessary, such as a TRACH and PEG or instead elect to move to [id number] Dtr believes she will not want her mother to have either of these tubes but she will need more time and possibly the support of her spouse, who could not be present today. We will continue to support her. 7. Initial consult note routed to primary continuity provider  8. Communicated plan of care with: Palliative IDT, ICU team, Attending. GOALS OF CARE:     [====Goals of Care====]  GOALS OF CARE:  Patient / health care proxy stated goals:       TREATMENT PREFERENCES:   Code Status: Full Code    Advance Care Planning:  Advance Care Planning 5/7/2017   Patient's Healthcare Decision Maker is: Named in scanned ACP document   Primary Decision Maker Name -   Primary Decision Maker Phone Number -   Confirm Advance Directive Yes, not on file       Other:    The palliative care team has discussed with patient / health care proxy about goals of care / treatment preferences for patient.  [====Goals of Care====]    Advance Care Planning 5/7/2017   Patient's Healthcare Decision Maker is: Named in scanned ACP document   Primary Decision Maker Name -   Primary Decision Maker Phone Number -   Confirm Advance Directive Yes, not on file      Stef [phone]     HISTORY:     History obtained from: Chart    CHIEF COMPLAINT: AMS and Vomiting with weakness    HPI/SUBJECTIVE:  99.9, 80,125/63, 20 intubated at 100%, wt 86lb. Khanna 1129, Colostomy 650ml.    MAR-Nebs, Norvasc, Folvite, Labetalol, Cardene, Protonix, Pericolace, has not rec'd any Propofol or Fentanyl  LABS-WBC 18.7->16.5, H&H 10.3 & 30.4, Plat 162, INR 1.2, BUN/Creat 11/1.03, Ammonia 32, HgbA1c 6.1, Albumin 3.4, TSH 0.88  Head CT-Large intraparenchymal hemorrhage centered in the left basal ganglia, slightly improved from the prior study, with improved right to left midline shift. Persistent intraventricular extension with improvement in the left  lateral ventricle and increased/new hemorrhage in the right ventricle. A  possible small new areas of subarachnoid hemorrhage. Head CT-There is a large intraparenchymal hemorrhage in the region the left basal  ganglia with mass effect and midline shift to the right by 6 mm. There is  significant extension of this hemorrhage into the ventricular system. There is  dilatation of the right lateral ventricle. Significant small vessel ischemic  disease seen  CXR-  Adequately positioned support devices.         Neuro Consult-\"Large Left ICH:  Neurosurgery evaluated and did no think there was a surgical role. ICH score suggests 97% mortality. Discussed with her daughter the significant residual disability the patient would be left with even if she survived and could be extubated. 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 The daughter said she spoke with palliative care for 2 hours about all of this as well.     Family is still deciding on plans. Discussed with Dr. Marilyn Manriquez. Getting CT head to evaluate for worsening hydrocephalus\"  Neurosurgery consult-dominant hemisphere large basil ganglia hemorrhage with interventricular extension  PLAN:This is all assuming the setting of dementia and colon cancer. This carries a very poor prognosis for any quality of life. Neurosurgical intervention would not improve the outcome.   Discussions are being had with the power of   The patient is:   [] Verbal and participatory  [x] Non-participatory due to:   intubated    Clinical Pain Assessment (nonverbal scale for nonverbal patients):    Resting comfortably-no evidence of pain       FUNCTIONAL ASSESSMENT:     Palliative Performance Scale (PPS):10%          PSYCHOSOCIAL/SPIRITUAL SCREENING:     Advance Care Planning:  Advance Care Planning 5/7/2017   Patient's Healthcare Decision Maker is: Named in scanned ACP document   Primary Decision Maker Name -   Primary Decision Maker Phone Number -   Confirm Advance Directive Yes, not on file        Any spiritual / Samaritan concerns:  [] Yes /  [x] No    Caregiver Burnout:  [] Yes /  [x] No /  [] No Caregiver Present      Anticipatory grief assessment:   [x] Normal  / [] Maladaptive       ESAS Anxiety:      ESAS Depression:          REVIEW OF SYSTEMS:     Positive and pertinent negative findings in ROS are noted above in HPI. The following systems were [] reviewed / [x] unable to be reviewed as noted in HPI  Other findings are noted below. Systems: constitutional, ears/nose/mouth/throat, respiratory, gastrointestinal, genitourinary, musculoskeletal, integumentary, neurologic, psychiatric, endocrine. Positive findings noted below. Modified ESAS Completed by: provider                                            PHYSICAL EXAM:     Wt Readings from Last 3 Encounters:   05/10/17 39.2 kg (86 lb 6.7 oz)   05/03/17 41.2 kg (90 lb 12.8 oz)   03/21/17 41.9 kg (92 lb 6.4 oz)     Blood pressure 125/63, pulse 80, temperature 99.9 °F (37.7 °C), resp. rate 20, height 5' 4\" (1.626 m), weight 39.2 kg (86 lb 6.7 oz), SpO2 100 %.   Pain:  Pain Scale 1: Adult Nonverbal Pain Scale  Pain Intensity 1: 0                 Last bowel movement: colostomy 120 ml    Constitutional: eyes closed, did not open for voice/touch, did not follow commands, intubated         HISTORY:     Active Problems:    Cerebral hemorrhage ([facility] [address] (5/6/2017)      Dementia (5/9/2017) Vomiting (5/9/2017)      Altered mental status (5/9/2017)      Past Medical History:   Diagnosis Date    ACP (advance care planning) 11/17/2016    Anemia NEC     Colon cancer ([address] 4/23/2010    Colon polyps     Descending colon; Sigmoid colon X2    Diverticulosis     Gastric polyps     GERD (gastroesophageal reflux disease)     Glaucoma 4/23/2010    Hypertension 4/23/2010    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   Social History   Substance Use Topics    Smoking status: Former Smoker     Packs/day: 0.25     Years: 10.00     Quit date: 1992    Smokeless tobacco: Never Used    Alcohol use No     Allergies   Allergen Reactions    Penicillins Not Reported This Time      Current Facility-Administered Medications   Medication Dose Route Frequency    dextrose 5% - 0.9% NaCl with KCl 40 mEq/L infusion   IntraVENous CONTINUOUS    niCARdipine (CARDENE) 25 mg in 0.9% sodium chloride 250 mL infusion  0-15 mg/hr IntraVENous TITRATE    labetalol (NORMODYNE;TRANDATE) 20 mg/4 mL (5 mg/mL) injection 10 mg  10 mg IntraVENous Q4H PRN    amLODIPine (NORVASC) tablet 10 mg  10 mg Oral DAILY    insulin lispro (HUMALOG) injection   SubCUTAneous Q6H    glucose chewable tablet 16 g  4 Tab Oral PRN    glucagon (GLUCAGEN) injection 1 mg  1 mg IntraMUSCular PRN    dextrose (D50W) injection syrg 12.5-25 g  25-50 mL IntraVENous PRN    fentaNYL citrate (PF) injection 50 mcg  50 mcg IntraVENous Q1H PRN    ELECTROLYTE REPLACEMENT PROTOCOL  1 Each Other Q8H    ELECTROLYTE REPLACEMENT PROTOCOL  1 Each Other Q8H    ELECTROLYTE REPLACEMENT PROTOCOL  1 Each Other Q8H    PHARMACY INFORMATION NOTE  1 Each Other DAILY    chlorhexidine (PERIDEX) 0.12 % mouthwash 15 mL  15 mL Oral Q12H    ondansetron (ZOFRAN) injection 1 mg  1 mg IntraVENous Q6H PRN    acetaminophen (TYLENOL) tablet 650 mg  650 mg Oral Q4H PRN    acetaminophen (TYLENOL) suppository 650 mg  650 mg Rectal Q4H PRN    senna-docusate (PERICOLACE) 8.6-50 mg per tablet 2 Tab  2 Tab Oral QHS    bisacodyl (DULCOLAX) tablet 5 mg  5 mg Oral DAILY PRN    bisacodyl (DULCOLAX) suppository 10 mg  10 mg Rectal DAILY PRN    pantoprazole (PROTONIX) 40 mg in sodium chloride 0.9 % 10 mL injection  40 mg IntraVENous Q12H    albuterol (PROVENTIL VENTOLIN) nebulizer solution 2.5 mg  2.5 mg Nebulization I1V PRN    folic acid (FOLVITE) 1 mg, thiamine (B-1) 100 mg in 0.9% sodium chloride 50 mL ivpb   IntraVENous DAILY        LAB AND IMAGING FINDINGS:     Lab Results   Component Value Date/Time    WBC 16.5 05/10/2017 03:40 AM    HGB 11.2 05/10/2017 03:40 AM    PLATELET 631 82/89/1045 03:40 AM     Lab Results   Component Value Date/Time    Sodium 153 05/10/2017 03:40 AM    Potassium 4.0 05/10/2017 03:40 AM    Chloride 118 05/10/2017 03:40 AM    CO2 23 05/10/2017 03:40 AM    BUN 12 05/10/2017 03:40 AM    Creatinine 1.03 05/10/2017 03:40 AM    Calcium 9.7 05/10/2017 03:40 AM    Magnesium 2.3 05/10/2017 03:40 AM    Phosphorus 3.0 05/10/2017 03:40 AM      Lab Results   Component Value Date/Time    AST (SGOT) 38 05/09/2017 03:35 AM    Alk.  phosphatase 61 05/09/2017 03:35 AM    Protein, total 6.9 05/09/2017 03:35 AM    Albumin 3.4 05/09/2017 03:35 AM    Globulin 3.5 05/09/2017 03:35 AM    GGT 33 12/09/2015 02:06 PM     Lab Results   Component Value Date/Time    INR 1.2 05/10/2017 03:40 AM    Prothrombin time 14.8 05/10/2017 03:40 AM    aPTT 33.9 05/10/2017 03:40 AM      Lab Results   Component Value Date/Time    Iron 29 06/06/2016 11:47 AM    TIBC 217 06/06/2016 11:47 AM    Iron % saturation 13 06/06/2016 11:47 AM    Ferritin 140 05/03/2017 11:45 AM      No results found for: PH, PCO2, PO2  No components found for: [place]   Lab Results   Component Value Date/Time     05/07/2017 05:50 PM    CK - MB 2.9 05/07/2017 05:50 PM              Total time: 110  Counseling / coordination time, spent as noted above: 90  > 50% counseling / coordination?: yes with dtr   Prolonged service was provided for  []30 min   [x]75 min in face to face time in the presence of the patient, spent as noted above. Time Start: 2:pmTime End 3:30 pm  Note: this can only be billed with  (initial) or 21 [phone] (follow up). If multiple start / stop times, list each separately.

## 2017-05-10 NOTE — MED STUDENT NOTES
*ATTENTION:  This note has been created by a medical student for educational purposes only. Please do not refer to the content of this note for clinical decision-making, billing, or other purposes. Please see attending physicians note to obtain clinical information on this patient. *       Student Progress Note  Please refer to attendings daily rounding note for full details      Patient: Gisel Forbes MRN: [id number]  CSN: [id number]    YOB: 1930  Age: 80 y.o. Sex: female    DOA: 5/6/2017 LOS:  LOS: 4 days          Chief Complaint:  Altered mental status    Subjective:    Pt is asleep and resting comfortably. Responds to painful stimulus, moves left extremities but not right       Objective:      Visit Vitals    /73    Pulse (!) 106    Temp 99.9 °F (37.7 °C)    Resp 22    Ht 5' 4\" (1.626 m)    Wt 39.2 kg (86 lb 6.7 oz)    SpO2 100%    BMI 14.83 kg/m2         Physical Exam:  Gen: pt is asleep, NAD  HEENT: normocephalic, nontraumatic, trachea midline, intubated  CV: RRR x4, no murmurs, rubs or gallops  Resp: CTA x12, no wheezing, rales or rhonchi, on ventilator  Abd: nondistended, normal bowel sounds, no organomegaly  Extrem:  No peripheral edema in lower extremities bilaterally   Skin: normal color, texture and turgor   Neuro: responds to painful stimulus, no movement of right upper and lower extremities      I have reviewed the patient's Labs and Radiology studies.   Lab Results   Component Value Date/Time    WBC 16.5 05/10/2017 03:40 AM    WBC 6.5 05/30/2012 10:17 AM    Hemoglobin (POC) 9.2 04/07/2010 05:19 PM    HGB 11.2 05/10/2017 03:40 AM    Hematocrit (POC) 27 04/07/2010 05:19 PM    HCT 33.1 05/10/2017 03:40 AM    PLATELET 356 82/51/7017 03:40 AM    MCV 89.9 05/10/2017 03:40 AM     Lab Results   Component Value Date/Time    Sodium 153 05/10/2017 03:40 AM    Potassium 4.0 05/10/2017 03:40 AM    Chloride 118 05/10/2017 03:40 AM    CO2 23 05/10/2017 03:40 AM    Anion gap 12 05/10/2017 03:40 AM    Glucose 142 05/10/2017 03:40 AM    BUN 12 05/10/2017 03:40 AM    Creatinine 1.03 05/10/2017 03:40 AM    BUN/Creatinine ratio 12 05/10/2017 03:40 AM    GFR est AA >60 05/10/2017 03:40 AM    GFR est non-AA 51 05/10/2017 03:40 AM    Calcium 9.7 05/10/2017 03:40 AM    Bilirubin, total 0.9 05/09/2017 03:35 AM    AST (SGOT) 38 05/09/2017 03:35 AM    Alk.  phosphatase 61 05/09/2017 03:35 AM    Protein, total 6.9 05/09/2017 03:35 AM    Albumin 3.4 05/09/2017 03:35 AM    Globulin 3.5 05/09/2017 03:35 AM    A-G Ratio 1.0 05/09/2017 03:35 AM    ALT (SGPT) 27 05/09/2017 03:35 AM       Intake/Output Summary (Last 24 hours) at 05/10/17 0944  Last data filed at 05/10/17 0900   Gross per 24 hour   Intake          1485.88 ml   Output             2520 ml   Net         -1034.12 ml       Current Facility-Administered Medications:     dextrose 5% - 0.9% NaCl with KCl 40 mEq/L infusion, , IntraVENous, CONTINUOUS, Jose Lozano MD, Last Rate: 37.4 mL/hr at 05/10/17 0642    niCARdipine (CARDENE) 25 mg in 0.9% sodium chloride 250 mL infusion, 0-15 mg/hr, IntraVENous, TITRATE, YENIFER Herrera, Stopped at 05/10/17 0159    labetalol (NORMODYNE;TRANDATE) 20 mg/4 mL (5 mg/mL) injection 10 mg, 10 mg, IntraVENous, Q4H PRN, YENIFER Herrera, 10 mg at 05/10/17 0631    amLODIPine (NORVASC) tablet 10 mg, 10 mg, Oral, DAILY, Judith Reynolds MD, 10 mg at 05/10/17 3850    insulin lispro (HUMALOG) injection, , SubCUTAneous, Q6H, Gil Morrow MD, 2 Units at 05/10/17 2864    glucose chewable tablet 16 g, 4 Tab, Oral, PRN, Gil Morrow MD    glucagon (GLUCAGEN) injection 1 mg, 1 mg, IntraMUSCular, PRN, Gil Morrow MD    dextrose (D50W) injection syrg 12.5-25 g, 25-50 mL, IntraVENous, PRN, Gil Morrow MD    fentaNYL citrate (PF) injection 50 mcg, 50 mcg, IntraVENous, Q1H PRN, Inez Steve MD, 50 mcg at 05/06/17 2038    ELECTROLYTE REPLACEMENT PROTOCOL, 1 Each, Other, Q8H, Joy MARINO Guilherme Todd MD, 1 Each at 05/10/17 0600    ELECTROLYTE REPLACEMENT PROTOCOL, 1 Each, Other, Edwar Solares MD, 1 Each at 05/09/17 1400    ELECTROLYTE REPLACEMENT PROTOCOL, 1 Each, Other, Edwar Solares MD, 1 Each at 05/09/17 1400    PHARMACY INFORMATION NOTE, 1 Each, Other, Soco Figueroa MD, 1 Each at 05/10/17 0600    chlorhexidine (PERIDEX) 0.12 % mouthwash 15 mL, 15 mL, Oral, Q12H, Chel Puga MD, 15 mL at 05/10/17 0830    ondansetron (ZOFRAN) injection 1 mg, 1 mg, IntraVENous, Q6H PRN, Chel Puga MD    acetaminophen (TYLENOL) tablet 650 mg, 650 mg, Oral, Q4H PRN, Chel Puga MD    acetaminophen (TYLENOL) suppository 650 mg, 650 mg, Rectal, Q4H PRN, Chel Puga MD    senna-docusate (PERICOLACE) 8.6-50 mg per tablet 2 Tab, 2 Tab, Oral, QHS, Chel Puga MD, Stopped at 05/07/17 2239    bisacodyl (DULCOLAX) tablet 5 mg, 5 mg, Oral, DAILY PRN, Chel Puga MD    bisacodyl (DULCOLAX) suppository 10 mg, 10 mg, Rectal, DAILY PRN, Chel Puga MD    pantoprazole (PROTONIX) 40 mg in sodium chloride 0.9 % 10 mL injection, 40 mg, IntraVENous, Q12H, Chel Puga MD, 40 mg at 05/10/17 0830    albuterol (PROVENTIL VENTOLIN) nebulizer solution 2.5 mg, 2.5 mg, Nebulization, Q4H PRN, Chel Puga MD    folic acid (FOLVITE) 1 mg, thiamine (B-1) 100 mg in 0.9% sodium chloride 50 mL ivpb, , IntraVENous, DAILY, Joy Albarado MD        Assessment:   Basal ganglia hemorrhage       Plan:   Basal ganglia hemorrhage     Neurology consulted     Continue supportive measures     Keep systolic bp less that 609     Palliative care talking with family about care they want     No neurosurgical intervention per neurosurgery poor prognosis       Safia LO [address]  5/10/2017  9:43 AM  *ATTENTION:  This note has been created by a medical student for educational purposes only.   Please do not refer to the content of this note for clinical decision-making, billing, or other purposes. Please see attending physicians note to obtain clinical information on this patient. *

## 2017-05-10 NOTE — PROGRESS NOTES
and Palliative MD Dr Bibi Tristan met with Ms Carcamo's daughter Valeri Mann in conference room off of ICU. Ms Carcamo's daughter Lisbeth Barry was present via phone connection. Family was updated about Ms Carcamo's medical condition/prognosis and their questions were answered. Code status was discussed - both what a partial code and a full DNR looked like for their Mom and what a full code means if her heart stops now while she is intubated. While she was able, Ms Roya Dumas completed an AMD, naming daughter Chris Anton as MPOA; her medical wishes are clear at end of life, \"I do not want any treatments to prolong my life. \" Giuseppe Anton expressed her gratitude for help understanding that her Mom had already made those important decisions. Chris Anton stated that she needed more time to make a decision about signing DDNR for Ms Roya Dumas to be a partial code, no chest compressions and shock when her heart stops. Chris Anton indicated that her mom would not want a trach and peg if she does not improve. Hospice/comfort measures was introduced as an option of treatment for Ms Cecelia Talavera breathing tube is removed.  offered compassionate listening support as Chris Anton described her mom as independent, living alone and not wanting any help. Chris Anton shared that her spouse, her giana, Muslim family are helping her through this \"dark time. \" (Nataliia's brother  in October and a friend  recently.)    Chris Anton and Santos Goldberg expressed their gratitude for our time, information and support. Chaplains will continue to be available for follow up support as needed/requested.     Luis Abebe, Palliative

## 2017-05-10 NOTE — PROGRESS NOTES
1415 - Received report from Mina Hernandez, [facility]. Assumed care of pt.  1500 - NIH completed. Pt will look at person talking but does not follow commands or show that she can see or follow anything in front of her face. Will continue to monitor. 36 - Talking with [facility], DENIS about pt's condition and what we are doing for her at this point. Understands and discussed \"taking out the tube Saturday\".

## 2017-05-10 NOTE — PROGRESS NOTES
[facility]  ICU Progress Note      Name: Pawan Peterson   : 1930   MRN: [id number]   Date: 5/10/2017 11:29 AM     [x]I have reviewed the flowsheet and previous days notes. Events overnight reviewed and discussed with nursing staff. Vital signs and records reviewed. 5/10/2017  CT noted with some improvement in hemorrhage and shift, inc/new hemorrhage in R ventricle and possible SAH  Tmax 99.9, WBC down  Continues to move L side spontaneously  Placed on SBT this am    [x]The patient is unable to give any meaningful history or review of systems because the patient is:  [x]Intubated []Sedated   []Unresponsive      [x]The patient is critically ill on      [x]Mechanical ventilation []Pressors   []BiPAP []                 ROS:Review of systems not obtained due to patient factors.     Medication Review:  · Pressors -  · Sedation -  · Antibiotics -  · Pain -  · GI/ DVT -protonix  · Others (other gtts)    Safety Bundles: VAP Bundle/ CAUTI/ Severe Sepsis Protocol/ Electrolyte Replacement Protocol    Vital Signs:    Visit Vitals    /73    Pulse (!) 106    Temp 99.9 °F (37.7 °C)    Resp 22    Ht 5' 4\" (1.626 m)    Wt 39.2 kg (86 lb 6.7 oz)    SpO2 100%    BMI 14.83 kg/m2       O2 Device: Ventilator       Temp (24hrs), Av.8 °F (37.1 °C), Min:97.8 °F (36.6 °C), Max:99.9 °F (37.7 °C)       Intake/Output:   Last shift:      05/10 0701 - 05/10 1900  In: 104.8 [I.V.:74.8]  Out: 20 [Urine:20]  Last 3 shifts: 1901 - 05/10 07  In: 2355.1 [I.V.:2164.1]  Out: 2750 [Urine:2100]    Intake/Output Summary (Last 24 hours) at 05/10/17 0942  Last data filed at 05/10/17 0900   Gross per 24 hour   Intake          1485.88 ml   Output             2520 ml   Net         -1034.12 ml       Ventilator Settings:  Ventilator  Mode: Assist control, VC+  Respiratory Rate  Resp Rate Observed: 22  Back-Up Rate: 12  Insp Time (sec): 0.9 sec  I:E Ratio: 1:5.58  Ventilator Volumes  Vt Set (ml): 350 ml  Vt Exhaled (Machine Breath) (ml): 363 ml  Ve Observed (l/min): 7.36 l/min  Ventilator Pressures  PIP Observed (cm H2O): 16 cm H2O  Plateau Pressure (cm H2O): 15 cm H2O  MAP (cm H2O): 6.9  PEEP/VENT (cm H2O): 5 cm H20  Auto PEEP Observed (cm H2O):  (UNABLE TO MAINTAIN)    Physical Exam:    General/Neuro: Intubated, off sedation, moves L UE/LE spontaneously, opens eyes to NS and pulls away, WD RLE, ?extension RUE with NS, L pupil pinpoint, breathing over vent  HEENT:  Anicteric sclerae; ETT in place  Resp:  Symmetrical chest expansion, adequate airway entry; no rales/ wheezing/ rhonchi noted  CV:  Regular, no m  GI:  Abdomen soft, non-tender; (+) active bowel sounds, +colostomy  Extremities:  +2 pulses on all extremities; SCD in place  Skin:  Warm; no rashes/ lesions noted    DATA:     Current Facility-Administered Medications   Medication Dose Route Frequency    dextrose 5% - 0.9% NaCl with KCl 40 mEq/L infusion   IntraVENous CONTINUOUS    niCARdipine (CARDENE) 25 mg in 0.9% sodium chloride 250 mL infusion  0-15 mg/hr IntraVENous TITRATE    labetalol (NORMODYNE;TRANDATE) 20 mg/4 mL (5 mg/mL) injection 10 mg  10 mg IntraVENous Q4H PRN    amLODIPine (NORVASC) tablet 10 mg  10 mg Oral DAILY    insulin lispro (HUMALOG) injection   SubCUTAneous Q6H    glucose chewable tablet 16 g  4 Tab Oral PRN    glucagon (GLUCAGEN) injection 1 mg  1 mg IntraMUSCular PRN    dextrose (D50W) injection syrg 12.5-25 g  25-50 mL IntraVENous PRN    fentaNYL citrate (PF) injection 50 mcg  50 mcg IntraVENous Q1H PRN    ELECTROLYTE REPLACEMENT PROTOCOL  1 Each Other Q8H    ELECTROLYTE REPLACEMENT PROTOCOL  1 Each Other Q8H    ELECTROLYTE REPLACEMENT PROTOCOL  1 Each Other Q8H    PHARMACY INFORMATION NOTE  1 Each Other DAILY    chlorhexidine (PERIDEX) 0.12 % mouthwash 15 mL  15 mL Oral Q12H    ondansetron (ZOFRAN) injection 1 mg  1 mg IntraVENous Q6H PRN    acetaminophen (TYLENOL) tablet 650 mg  650 mg Oral Q4H PRN    acetaminophen (TYLENOL) suppository 650 mg  650 mg Rectal Q4H PRN    senna-docusate (PERICOLACE) 8.6-50 mg per tablet 2 Tab  2 Tab Oral QHS    bisacodyl (DULCOLAX) tablet 5 mg  5 mg Oral DAILY PRN    bisacodyl (DULCOLAX) suppository 10 mg  10 mg Rectal DAILY PRN    pantoprazole (PROTONIX) 40 mg in sodium chloride 0.9 % 10 mL injection  40 mg IntraVENous Q12H    albuterol (PROVENTIL VENTOLIN) nebulizer solution 2.5 mg  2.5 mg Nebulization O7C PRN    folic acid (FOLVITE) 1 mg, thiamine (B-1) 100 mg in 0.9% sodium chloride 50 mL ivpb   IntraVENous DAILY         Labs: Results:       Chemistry Recent Labs      05/10/17   0340  05/09/17   1622  05/09/17   0335   05/08/17   0350   GLU  142*   --   144*   --   127*   NA  153*   --   151*   --   152*   K  4.0  4.1  3.5   < >  4.1   CL  118*   --   116*   --   117*   CO2  23   --   24   --   23   BUN  12   --   11   --   11   CREA  1.03   --   1.03   --   1.15   CA  9.7   --   9.7   --   9.4   AGAP  12   --   11   --   12   BUCR  12   --   11*   --   10*   AP   --    --   61   --    --    TP   --    --   6.9   --    --    ALB   --    --   3.4   --    --    GLOB   --    --   3.5   --    --    AGRAT   --    --   1.0   --    --     < > = values in this interval not displayed. CBC w/Diff Recent Labs      05/10/17   0340  05/09/17   0335  05/08/17   1045   WBC  16.5*  18.7*  22.2*   RBC  3.68*  3.42*  3.37*   HGB  11.2*  10.3*  10.2*   HCT  33.1*  30.4*  30.0*   PLT  180  162  153   GRANS  89*  89*  89*   LYMPH  4*  3*  5*   EOS  0  0  0      Coagulation Recent Labs      05/10/17   0340  05/09/17   0335   PTP  14.8  15.6*   INR  1.2  1.3*   APTT  33.9  36.0       Liver Enzymes Recent Labs      05/09/17   0335   TP  6.9   ALB  3.4   AP  61   SGOT  38*      ABG No results found for: PH, PHI, PCO2, PCO2I, PO2, PO2I, HCO3, HCO3I, FIO2, FIO2I   Microbiology No results for input(s): CULT in the last 72 hours.        Telemetry: [x]Sinus tach []A-flutter []Paced    []A-fib []Multiple PVCs                    Imaging:  []I have personally reviewed the patients radiographs  []Radiographs reviewed with radiologist   []No change from prior, tubes and lines in adequate position  []Improved   []Worsening    5/10/17 CXR   1. Lines and tubes in good position. 2. No acute cardiopulmonary disease     5/9/17 CT head  1. Large intraparenchymal hemorrhage centered in the left basal ganglia,  slightly improved from the prior study, with improved right to left midline  shift. Persistent intraventricular extension with improvement in the left  lateral ventricle and increased/new hemorrhage in the right ventricle. A  possible small new areas of subarachnoid hemorrhage. 5/6/17 CT head    There is a large intraparenchymal hemorrhage in the region the left basal  ganglia with mass effect and midline shift to the right by 6 mm. There is  significant extension of this hemorrhage into the ventricular system. There is  dilatation of the right lateral ventricle. Significant small vessel ischemic  disease seen.       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, some improvement repeat CT 5/9 (see above report)  - Neurosurgery and neurology following  - No plans for surgical intervention-poor prognosis per NS  - BP control  - ICH orderset  · Pulm: VDRF due to above  - SBT today  - Vent orderset  - CXR stable today  · CV: HTN  - SBP goal<140  - On norvasc  - cardene gtt, labetalol prn  - Cont IVF  · GI  - Continue protonix  - Started TF, advance as tolerated  · Renal/Metabolic-hypomagnesemia-resolved  - Replace lytes per protocol  ·   - cont purewick or straight cath prn for now, singh can be replaced 5/10 if needed  · ID  - monitor temps, leukocytosis improving        PLAN:   · Prophylaxis - DVT-SCD, GI-protonix  · Discussed in interdisciplinary rounds  · FULL CODE (see ACP on file)  · Palliative care following, continue ongoing family discussions          The patient is: [] acutely ill Risk of deterioration: [] moderate    [x] critically ill  [x] high     [x]See my orders for details    My assessment/plan was discussed with:  [x]nursing []PT/OT    []respiratory therapy [x]   []family []         YENIFER Cruz

## 2017-05-10 NOTE — PROGRESS NOTES
[facility]  Hospitalist Division    Daily progress Note    Patient: Roman Ledesma MRN: [id number]  CSN: [id number]    YOB: 1930  Age: 80 y.o.   Sex: female    DOA: 5/6/2017 LOS:  LOS: 4 days                    Subjective:     CC: ICH     Somnolent , intubated   Moving left randomly, withdraws from pain     Objective:      Visit Vitals    /78    Pulse 92    Temp 97.7 °F (36.5 °C)    Resp 18    Ht 5' 4\" (1.626 m)    Wt 39.2 kg (86 lb 6.7 oz)    SpO2 100%    BMI 14.83 kg/m2       Physical Exam:  NC/AT  NO JVD,TMG  S1/S2 RRR  CTAB, NO WHEEZING  NT,ND, NABS  NO EDEMA  MS 0/5 on Rt, moving Lt randomly         Intake and Output:  Current Shift:  05/10 0701 - 05/10 1900  In: 331.8 [I.V.:261.8]  Out: 20 [Urine:20]  Last three shifts:  05/08 1901 - 05/10 0700  In: 2355.1 [I.V.:2164.1]  Out: 2750 [Urine:2100]    Recent Results (from the past 24 hour(s))   TYPE & SCREEN    Collection Time: 05/09/17  4:22 PM   Result Value Ref Range    Crossmatch Expiration 05/12/2017     ABO/Rh(D) B POSITIVE     Antibody screen NEG    PHOSPHORUS    Collection Time: 05/09/17  4:22 PM   Result Value Ref Range    Phosphorus 2.8 2.5 - 4.9 MG/DL   POTASSIUM    Collection Time: 05/09/17  4:22 PM   Result Value Ref Range    Potassium 4.1 3.5 - 5.5 mmol/L   GLUCOSE, POC    Collection Time: 05/09/17  5:27 PM   Result Value Ref Range    Glucose (POC) 145 (H) 70 - 110 mg/dL   GLUCOSE, POC    Collection Time: 05/10/17 12:07 AM   Result Value Ref Range    Glucose (POC) 150 (H) 70 - 110 mg/dL   CALCIUM, IONIZED    Collection Time: 05/10/17  3:40 AM   Result Value Ref Range    Ionized Calcium 1.19 1.12 - 1.32 MMOL/L   MAGNESIUM    Collection Time: 05/10/17  3:40 AM   Result Value Ref Range    Magnesium 2.3 1.6 - 2.6 mg/dL   PHOSPHORUS    Collection Time: 05/10/17  3:40 AM   Result Value Ref Range    Phosphorus 3.0 2.5 - 4.9 MG/DL   PROTHROMBIN TIME + INR    Collection Time: 05/10/17  3:40 AM Result Value Ref Range    Prothrombin time 14.8 11.5 - 15.2 sec    INR 1.2 0.8 - 1.2     PTT    Collection Time: 05/10/17  3:40 AM   Result Value Ref Range    aPTT 33.9 23.0 - 39.5 SEC   METABOLIC PANEL, BASIC    Collection Time: 05/10/17  3:40 AM   Result Value Ref Range    Sodium 153 (H) 136 - 145 mmol/L    Potassium 4.0 3.5 - 5.5 mmol/L    Chloride 118 (H) 100 - 108 mmol/L    CO2 23 21 - 32 mmol/L    Anion gap 12 3.0 - 18 mmol/L    Glucose 142 (H) 74 - 99 mg/dL    BUN 12 7.0 - 18 MG/DL    Creatinine 1.03 0.6 - 1.3 MG/DL    BUN/Creatinine ratio 12 12 - 20      GFR est AA >60 >60 ml/min/1.73m2    GFR est non-AA 51 (L) >60 ml/min/1.73m2    Calcium 9.7 8.5 - 10.1 MG/DL   CBC WITH AUTOMATED DIFF    Collection Time: 05/10/17  3:40 AM   Result Value Ref Range    WBC 16.5 (H) 4.6 - 13.2 K/uL    RBC 3.68 (L) 4.20 - 5.30 M/uL    HGB 11.2 (L) 12.0 - 16.0 g/dL    HCT 33.1 (L) 35.0 - 45.0 %    MCV 89.9 74.0 - 97.0 FL    MCH 30.4 24.0 - 34.0 PG    MCHC 33.8 31.0 - 37.0 g/dL    RDW 14.8 (H) 11.6 - 14.5 %    PLATELET 197 927 - 024 K/uL    MPV 10.9 9.2 - 11.8 FL    NEUTROPHILS 89 (H) 40 - 73 %    LYMPHOCYTES 4 (L) 21 - 52 %    MONOCYTES 7 3 - 10 %    EOSINOPHILS 0 0 - 5 %    BASOPHILS 0 0 - 2 %    ABS. NEUTROPHILS 14.7 (H) 1.8 - 8.0 K/UL    ABS. LYMPHOCYTES 0.6 (L) 0.9 - 3.6 K/UL    ABS. MONOCYTES 1.1 0.05 - 1.2 K/UL    ABS. EOSINOPHILS 0.0 0.0 - 0.4 K/UL    ABS.  BASOPHILS 0.0 0.0 - 0.06 K/UL    DF AUTOMATED     GLUCOSE, POC    Collection Time: 05/10/17  6:14 AM   Result Value Ref Range    Glucose (POC) 160 (H) 70 - 110 mg/dL   POTASSIUM    Collection Time: 05/10/17 10:25 AM   Result Value Ref Range    Potassium 4.1 3.5 - 5.5 mmol/L   GLUCOSE, POC    Collection Time: 05/10/17 12:12 PM   Result Value Ref Range    Glucose (POC) 180 (H) 70 - 110 mg/dL   POC G3    Collection Time: 05/10/17  1:22 PM   Result Value Ref Range    Device: VENT      FIO2 (POC) 30 %    pH (POC) 7.466 (H) 7.35 - 7.45      pCO2 (POC) 30.9 (LL) 35 - 45 MMHG pO2 (POC) 149 (H) 80 - 100 MMHG    HCO3 (POC) 22.4 22 - 26 MMOL/L    sO2 (POC) 99 (H) 92 - 97 %    Base deficit (POC) 1 mmol/L    Mode CPAP/SPON      PEEP/CPAP (POC) 5 cmH2O    Pressure support 7 cmH2O    Allens test (POC) YES      Total resp. rate 15      Site LEFT RADIAL      Patient temp.  97.7      Specimen type (POC) ARTERIAL      Performed by Halie Zhao          Current Facility-Administered Medications:     dextrose 5% - 0.9% NaCl with KCl 40 mEq/L infusion, , IntraVENous, CONTINUOUS, Lacy Perez MD, Last Rate: 37.4 mL/hr at 05/10/17 0642    niCARdipine (CARDENE) 25 mg in 0.9% sodium chloride 250 mL infusion, 0-15 mg/hr, IntraVENous, TITRATE, Rhianna Cruz, Stopped at 05/10/17 0159    labetalol (NORMODYNE;TRANDATE) 20 mg/4 mL (5 mg/mL) injection 10 mg, 10 mg, IntraVENous, Q4H PRN, YENIFER Cruz, 10 mg at 05/10/17 0957    amLODIPine (NORVASC) tablet 10 mg, 10 mg, Oral, DAILY, Tanisha Oleary MD, 10 mg at 05/10/17 2142    insulin lispro (HUMALOG) injection, , SubCUTAneous, Q6H, Bailey Mitchell MD, 2 Units at 05/10/17 1223    glucose chewable tablet 16 g, 4 Tab, Oral, PRN, Bailey Mitchell MD    glucagon (GLUCAGEN) injection 1 mg, 1 mg, IntraMUSCular, PRN, Bailey Mitchell MD    dextrose (D50W) injection syrg 12.5-25 g, 25-50 mL, IntraVENous, PRN, Bailey Mitchell MD    fentaNYL citrate (PF) injection 50 mcg, 50 mcg, IntraVENous, Q1H PRN, Nneka Mari MD, 50 mcg at 05/06/17 2038    ELECTROLYTE REPLACEMENT PROTOCOL, 1 Each, Other, Silverio Sexton MD, 1 Each at 05/10/17 0600    ELECTROLYTE REPLACEMENT PROTOCOL, 1 Each, Drea, Silverio Sexton MD, 1 Each at 05/09/17 1400    ELECTROLYTE REPLACEMENT PROTOCOL, 1 Each, Drea, Silverio Sexton MD, 1 Each at 05/09/17 1400    PHARMACY INFORMATION NOTE, 1 Each, Other, DAILY, Bailey Mitchell MD, 1 Each at 05/10/17 0600    chlorhexidine (PERIDEX) 0.12 % mouthwash 15 mL, 15 mL, Oral, Q12H, Joy MARINO MD Per, 15 mL at 05/10/17 0830    ondansetron (ZOFRAN) injection 1 mg, 1 mg, IntraVENous, Q6H PRN, Danni Barkley MD    acetaminophen (TYLENOL) tablet 650 mg, 650 mg, Oral, Q4H PRN, Danni Barkley MD    acetaminophen (TYLENOL) suppository 650 mg, 650 mg, Rectal, Q4H PRN, Danni Barkley MD    senna-docusate (PERICOLACE) 8.6-50 mg per tablet 2 Tab, 2 Tab, Oral, QHS, Danni Barkley MD, Stopped at 05/07/17 2239    bisacodyl (DULCOLAX) tablet 5 mg, 5 mg, Oral, DAILY PRN, Danni Barkley MD    bisacodyl (DULCOLAX) suppository 10 mg, 10 mg, Rectal, DAILY PRN, Danni Barkley MD    pantoprazole (PROTONIX) 40 mg in sodium chloride 0.9 % 10 mL injection, 40 mg, IntraVENous, Q12H, Danni Barkley MD, 40 mg at 05/10/17 0830    albuterol (PROVENTIL VENTOLIN) nebulizer solution 2.5 mg, 2.5 mg, Nebulization, Q4H PRN, Danni Barkley MD    folic acid (FOLVITE) 1 mg, thiamine (B-1) 100 mg in 0.9% sodium chloride 50 mL ivpb, , IntraVENous, DAILY, Danni Barkley MD    Lab Results   Component Value Date/Time    Glucose 142 05/10/2017 03:40 AM    Glucose 144 05/09/2017 03:35 AM    Glucose 127 05/08/2017 03:50 AM    Glucose 220 05/07/2017 04:15 AM    Glucose 114 05/06/2017 07:00 PM        Assessment/Plan     Active Problems:    Cerebral hemorrhage ([facility] [address] (5/6/2017)      Dementia (5/9/2017)      Vomiting (5/9/2017)      Altered mental status (5/9/2017)       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

## 2017-05-10 NOTE — PROGRESS NOTES
Problem: Ventilator Management  Goal: *Adequate oxygenation and ventilation  Intubated s/p cerebral hemorrhage      Current ventilator Settings- ACVC+ 12 VT-350, PEEP-5, FIO2-30%      ABG-5/6/17- 2150-pH-7.48, PCO2-45.2, PO2-564, HCO3-33.7, SO2-100      Xray-5/10/-Oceuebs spaces appear clear. No abnormal pulmonary opacities.       Plan: Maintain ventilatory support      Goal: Wean as tolerated

## 2017-05-11 NOTE — ROUTINE PROCESS
Bedside verbal report received from [facility]. Assumed care of patient at 0700.  0910 sbt began with Michele Yost RN  1045 SBT stopped. Passed per Michele Yost RN, patient in no apparent distress. Discussed with Dr. Michael Nelson. Throughout this shift this RN titrated the patient off of nicardipine, see MAR for details. Bedside and Verbal shift change report given to [facility] (oncoming nurse) by Jhonatan Graham RN   (offgoing nurse). Report included the following information SBAR and Kardex, recent vitals. Sidney Barthel

## 2017-05-11 NOTE — ROUTINE PROCESS
1900:  Rec'd Francisco Stallings @ 1900 from [address]. [address], RN. SBAR reviewed with two-nurse check-offs done of meds, dressings, wounds. Also NIH, neuro assessments. Overnight:  Restless, pulls at ostomy, reaches for ETT, no response to voice. Junky cough. Nicardipine titration reflects Ms. Carcamo's activity. Minimal residual.  Large urinary incontinence, pure-wick minimally effective. No calls, visits. Verbal Patient-side shift change report given to NAMAN Israel RN, (oncoming nurse) by Moe Hanna RN  (offgoing nurse). Report included the following information SBAR, Kardex, ED Summary, Procedure Summary, Intake/Output, MAR, Recent Results and Med Rec Status. Included:  Intro, hx, two-RN eval of relevant assessment findings, LDAs, skin, diagnostics and infusions.

## 2017-05-11 NOTE — PROGRESS NOTES
[facility]: [phone]  [facility]: [phone]   [facility]: [phone]    Patient Name: Cheryle Flemings  YOB: 1930    Date of Initial Consult: 5-8-17  Reason for Consult: Care Decisions  Requesting Provider: Dr Teodoro Edgar   Primary Care Physician: Rosalio Mayers., DO      SUMMARY:   Cheryle Flemings is a 80y.o. year old with a past history of colon cancer w/colostomy, hypertension, hypothyroidism, CKD stage 3,  who was admitted on 5/6/2017 from ER/Home with a diagnosis of AMS and  weakness, found to have CVA. Current medical issues leading to Palliative Medicine involvement include: patient was intubated in ER, imaging revealed left sided hemorrhage centered in the basal ganglia with significant interventricular extension and left to right shift, asked to support family to establish goals of care. PALLIATIVE DIAGNOSES:   1. Altered Mental Status  2. Vomiting  3. ICH  4. Dementia       PLAN:   1. Michael Goldsmith NP and I met with the patient, she is intubated, but appears more alert-eyes open but does not reliably track, did not follow any commands for us, was moving left side but did not move to command. No family at bedside, no family called for f/u conversation today, RN shared that dtr still struggling and hesitant to make any decisions. 2. AMS-improved, but not greatly, dtr likely more hesitant to make decisions while she thinks patient is still improving. 3. Vomiting-explained that with the injury to the brain there is often complaints of head ache and sometime nausea and vomiting. Explained that she was intubated to protect her airway, that it did not appear that she was unable to breath, but was believed to be too altered to keep her airway patent especially when she was having emesis. 4. ICH-neurology notes reviewed, had been shared yesterday with dtr.   5. Dementia-family shared that patient had been having some short term memory loss for about 2 yrs, that she lacked insight into her own limitations, wanting to live alone, refusing outside hired help but was unable to do her IADLS-cleaning, shopping, bills, appointments. She asked the same questions repeatedly. Shared that given her underlying dementia her stroke was likely going to cause more deficits than for those with normal brain functioning. 6. Goals of Care and Code Status- code remains FULL CODE, did not meet with dtr today, she had visited earlier. Patient did well today with SBT but b/c dtr has not been able to make a decision regarding code status or re-intubation, ICU team hesitates to extubate until more certain she will not require re-intubation. Will see if can support dtr tomorrow, if she can at least establish that she would not want patient to have [facility] and PEG then we can work with her to understand that over the next few days she will need to allow extubation and how we can support patient to be comfortable and assess her ability to take orally. 7. Initial consult note routed to primary continuity provider  8. Communicated plan of care with: Palliative IDT, ICU team, Attending.        GOALS OF CARE:     [====Goals of Care====]  GOALS OF CARE:  Patient / health care proxy stated goals:       TREATMENT PREFERENCES:   Code Status: Full Code    Advance Care Planning:  Advance Care Planning 5/7/2017   Patient's Healthcare Decision Maker is: Named in scanned ACP document   Primary Decision Maker Name -   Primary Decision Maker Phone Number -   Confirm Advance Directive Yes, not on file       Other:    The palliative care team has discussed with patient / health care proxy about goals of care / treatment preferences for patient.  [====Goals of Care====]    Advance Care Planning 5/7/2017   Patient's Healthcare Decision Maker is: Named in scanned ACP document   Primary Decision Maker Name -   Primary Decision Maker Phone Number -   Confirm Advance Directive Yes, not on file      Stef [phone]     HISTORY:     History obtained from: Chart    CHIEF COMPLAINT: AMS and Vomiting with weakness    HPI/SUBJECTIVE:  98.9, 98,136/63, 20 intubated at 100%, wt 86lb. voiding, Colostomy 80ml. MAR-Nebs, Norvasc, Folvite, Labetalol, Cardene, Protonix, Pericolace, has not rec'd any Propofol or Fentanyl  LABS-WBC 18.7->16.5->14.9, H&H 10.3 & 30.4, Plat 162, INR 1.2, BUN/Creat 11/1.03->17/1.43, Ammonia 32, HgbA1c 6.1, Albumin 3.4, TSH 0.88  Head CT-Large intraparenchymal hemorrhage centered in the left basal ganglia, slightly improved from the prior study, with improved right to left midline shift. Persistent intraventricular extension with improvement in the left  lateral ventricle and increased/new hemorrhage in the right ventricle. A  possible small new areas of subarachnoid hemorrhage. Head CT-There is a large intraparenchymal hemorrhage in the region the left basal  ganglia with mass effect and midline shift to the right by 6 mm. There is  significant extension of this hemorrhage into the ventricular system. There is  dilatation of the right lateral ventricle. Significant small vessel ischemic  disease seen  CXR-  Adequately positioned support devices.         Neuro Consult-\"Large Left ICH:  Neurosurgery evaluated and did no think there was a surgical role. ICH score suggests 97% mortality. Discussed with her daughter the significant residual disability the patient would be left with even if she survived and could be extubated. 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 The daughter said she spoke with palliative care for 2 hours about all of this as well.     Family is still deciding on plans. Discussed with Dr. Kike Robledo.  Getting CT head to evaluate for worsening hydrocephalus\"  Neurosurgery consult-dominant hemisphere large basil ganglia hemorrhage with interventricular extension  PLAN:This is all assuming the setting of dementia and colon cancer. This carries a very poor prognosis for any quality of life. Neurosurgical intervention would not improve the outcome. Discussions are being had with the power of   The patient is:   [] Verbal and participatory  [x] Non-participatory due to:   intubated    Clinical Pain Assessment (nonverbal scale for nonverbal patients):    Resting comfortably-no evidence of pain       FUNCTIONAL ASSESSMENT:     Palliative Performance Scale (PPS):20%          PSYCHOSOCIAL/SPIRITUAL SCREENING:     Advance Care Planning:  Advance Care Planning 5/7/2017   Patient's Healthcare Decision Maker is: Named in scanned ACP document   Primary Decision Maker Name -   Primary Decision Maker Phone Number -   Confirm Advance Directive Yes, not on file        Any spiritual / Pentecostalism concerns:  [] Yes /  [x] No    Caregiver Burnout:  [] Yes /  [x] No /  [] No Caregiver Present      Anticipatory grief assessment:   [x] Normal  / [] Maladaptive       ESAS Anxiety:      ESAS Depression:          REVIEW OF SYSTEMS:     Positive and pertinent negative findings in ROS are noted above in HPI. The following systems were [] reviewed / [x] unable to be reviewed as noted in HPI  Other findings are noted below. Systems: constitutional, ears/nose/mouth/throat, respiratory, gastrointestinal, genitourinary, musculoskeletal, integumentary, neurologic, psychiatric, endocrine. Positive findings noted below. Modified ESAS Completed by: provider                                            PHYSICAL EXAM:     Wt Readings from Last 3 Encounters:   05/11/17 41.4 kg (91 lb 4.3 oz)   05/03/17 41.2 kg (90 lb 12.8 oz)   03/21/17 41.9 kg (92 lb 6.4 oz)     Blood pressure 136/63, pulse 98, temperature 98.9 °F (37.2 °C), resp.  rate 21, height 5' 4\" (1.626 m), weight 41.4 kg (91 lb 4.3 oz), SpO2 100 %. Pain:  Pain Scale 1: Adult Nonverbal Pain Scale  Pain Intensity 1: 1                 Last bowel movement: colostomy 120 ml    Constitutional: eyes open, but was not tracking me, did not nod, nor follow any commands, left arm/hand moving, right side quiet         HISTORY:     Active Problems:    Cerebral hemorrhage (HCC) (5/6/2017)      Dementia (5/9/2017)      Vomiting (5/9/2017)      Altered mental status (5/9/2017)      Past Medical History:   Diagnosis Date    ACP (advance care planning) 11/17/2016    Anemia NEC     Colon cancer ([facility] [address] 4/23/2010    Colon polyps     Descending colon; Sigmoid colon X2    Diverticulosis     Gastric polyps     GERD (gastroesophageal reflux disease)     Glaucoma 4/23/2010    Hypertension 4/23/2010    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   Social History   Substance Use Topics    Smoking status: Former Smoker     Packs/day: 0.25     Years: 10.00     Quit date: 1992    Smokeless tobacco: Never Used    Alcohol use No     Allergies   Allergen Reactions    Penicillins Not Reported This Time      Current Facility-Administered Medications   Medication Dose Route Frequency    potassium phosphate 12 mmol in 0.9% sodium chloride 250 mL infusion   IntraVENous ONCE    dextrose 5% - 0.9% NaCl with KCl 40 mEq/L infusion   IntraVENous CONTINUOUS    niCARdipine (CARDENE) 25 mg in 0.9% sodium chloride 250 mL infusion  0-15 mg/hr IntraVENous TITRATE    labetalol (NORMODYNE;TRANDATE) 20 mg/4 mL (5 mg/mL) injection 10 mg  10 mg IntraVENous Q4H PRN    amLODIPine (NORVASC) tablet 10 mg  10 mg Oral DAILY    insulin lispro (HUMALOG) injection   SubCUTAneous Q6H    glucose chewable tablet 16 g  4 Tab Oral PRN    glucagon (GLUCAGEN) injection 1 mg  1 mg IntraMUSCular PRN    dextrose (D50W) injection syrg 12.5-25 g  25-50 mL IntraVENous PRN    fentaNYL citrate (PF) injection 50 mcg  50 mcg IntraVENous Q1H PRN    ELECTROLYTE REPLACEMENT PROTOCOL  1 Each Other Q8H    ELECTROLYTE REPLACEMENT PROTOCOL  1 Each Other Q8H    ELECTROLYTE REPLACEMENT PROTOCOL  1 Each Other Q8H    PHARMACY INFORMATION NOTE  1 Each Other DAILY    chlorhexidine (PERIDEX) 0.12 % mouthwash 15 mL  15 mL Oral Q12H    ondansetron (ZOFRAN) injection 1 mg  1 mg IntraVENous Q6H PRN    acetaminophen (TYLENOL) tablet 650 mg  650 mg Oral Q4H PRN    acetaminophen (TYLENOL) suppository 650 mg  650 mg Rectal Q4H PRN    senna-docusate (PERICOLACE) 8.6-50 mg per tablet 2 Tab  2 Tab Oral QHS    bisacodyl (DULCOLAX) tablet 5 mg  5 mg Oral DAILY PRN    bisacodyl (DULCOLAX) suppository 10 mg  10 mg Rectal DAILY PRN    pantoprazole (PROTONIX) 40 mg in sodium chloride 0.9 % 10 mL injection  40 mg IntraVENous Q12H    albuterol (PROVENTIL VENTOLIN) nebulizer solution 2.5 mg  2.5 mg Nebulization O4K PRN    folic acid (FOLVITE) 1 mg, thiamine (B-1) 100 mg in 0.9% sodium chloride 50 mL ivpb   IntraVENous DAILY        LAB AND IMAGING FINDINGS:     Lab Results   Component Value Date/Time    WBC 14.9 05/11/2017 03:40 AM    HGB 10.4 05/11/2017 03:40 AM    PLATELET 444 06/41/0763 03:40 AM     Lab Results   Component Value Date/Time    Sodium 155 05/11/2017 03:40 AM    Potassium 3.9 05/11/2017 03:40 AM    Chloride 124 05/11/2017 03:40 AM    CO2 23 05/11/2017 03:40 AM    BUN 17 05/11/2017 03:40 AM    Creatinine 1.43 05/11/2017 03:40 AM    Calcium 9.4 05/11/2017 03:40 AM    Magnesium 2.4 05/11/2017 03:40 AM    Phosphorus 1.5 05/11/2017 03:40 AM      Lab Results   Component Value Date/Time    AST (SGOT) 38 05/09/2017 03:35 AM    Alk. phosphatase 61 05/09/2017 03:35 AM    Protein, total 6.9 05/09/2017 03:35 AM    Albumin 3.4 05/09/2017 03:35 AM    Globulin 3.5 05/09/2017 03:35 AM    GGT 33 12/09/2015 02:06 PM     Lab Results   Component Value Date/Time    INR 1.3 05/11/2017 03:40 AM    Prothrombin time 15.7 05/11/2017 03:40 AM    aPTT 33.3 05/11/2017 03:40 AM      Lab Results   Component Value Date/Time    Iron 29 06/06/2016 11:47 AM    TIBC 217 06/06/2016 11:47 AM    Iron % saturation 13 06/06/2016 11:47 AM    Ferritin 140 05/03/2017 11:45 AM      No results found for: PH, PCO2, PO2  No components found for: [place]   Lab Results   Component Value Date/Time     05/07/2017 05:50 PM    CK - MB 2.9 05/07/2017 05:50 PM              Total time: 20  Counseling / coordination time, spent as noted above: 10  > 50% counseling / coordination?: yes with dtr   Prolonged service was provided for  []30 min   []75 min in face to face time in the presence of the patient, spent as noted above. Time Start: Time End  Note: this can only be billed with 90490 (initial) or 29874 (follow up). If multiple start / stop times, list each separately.

## 2017-05-11 NOTE — PROGRESS NOTES
Neurology Progress Note    Admit Date: 5/6/2017  Length of Stay: 5  Primary Care: Suzzette Boeck., DO       Assessment:    Principle Problem: <principal problem not specified>     Problem List: Active Problems:    Cerebral hemorrhage ([address] (5/6/2017)      Dementia (5/9/2017)      Vomiting (5/9/2017)      Altered mental status (5/9/2017)        Plan:    Large Left BG ICH  Neurosurgery evaluated and did not feel they had a role. Repeat CT head shows some modest improvement in B.G. hemorrhage size and more hemorrhage in the ventricles now.  -I again do not think there is a reasonable chance for independent living. If she can survive the hospital course I think there will certainly be right sided paralysis and possibly language impairment. DVT prophylaxis. No antiplatelets.        Interim History: no clinical changes overnight. Had head CT which showed a modest improvement in hemorrhage. Results reviewed:  CT Head 5/10/2017  Large intraparenchymal hemorrhage centered in the left basal ganglia,  slightly improved from the prior study, with improved right to left midline  shift. Persistent intraventricular extension with improvement in the left  lateral ventricle and increased/new hemorrhage in the right ventricle. A  possible small new areas of subarachnoid hemorrhage. IAllergies: Allergies   Allergen Reactions    Penicillins Not Reported This Time         Vital Signs:   Visit Vitals    /65    Pulse 89    Temp 98.4 °F (36.9 °C)    Resp 15    Ht 5' 4\" (1.626 m)    Wt 41.4 kg (91 lb 4.3 oz)    SpO2 100%    BMI 15.67 kg/m2        Neurological examination:   · Neuro: Not following commands. + VORs, surgical pupil on the right, pinpoint on the left. Moves head side to side when stimulated. opens eyes to noxious stimulus. Moves left arm and leg to noxious stimuli. Right dense hemiplegia. Does not blink to VT. Review of Systems: unable to obtain. 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    Thyroid disease       FH:   Family History   Problem Relation Age of Onset    Heart Attack Father     Diabetes Mother       SH:   Social History     Social History    Marital status:      Spouse name: N/A    Number of children: N/A    Years of education: N/A     Social History Main Topics    Smoking status: Former Smoker     Packs/day: 0.25     Years: 10.00     Quit date: 1992    Smokeless tobacco: Never Used    Alcohol use No    Drug use: No    Sexual activity: Not Asked     Other Topics Concern    None     Social History Narrative          Medications:    [x] REVIEWED  Current Facility-Administered Medications   Medication Dose Route Frequency    dextrose 5% - 0.9% NaCl with KCl 40 mEq/L infusion   IntraVENous CONTINUOUS    niCARdipine (CARDENE) 25 mg in 0.9% sodium chloride 250 mL infusion  0-15 mg/hr IntraVENous TITRATE    labetalol (NORMODYNE;TRANDATE) 20 mg/4 mL (5 mg/mL) injection 10 mg  10 mg IntraVENous Q4H PRN    amLODIPine (NORVASC) tablet 10 mg  10 mg Oral DAILY    insulin lispro (HUMALOG) injection   SubCUTAneous Q6H    glucose chewable tablet 16 g  4 Tab Oral PRN    glucagon (GLUCAGEN) injection 1 mg  1 mg IntraMUSCular PRN    dextrose (D50W) injection syrg 12.5-25 g  25-50 mL IntraVENous PRN    fentaNYL citrate (PF) injection 50 mcg  50 mcg IntraVENous Q1H PRN    ELECTROLYTE REPLACEMENT PROTOCOL  1 Each Other Q8H    ELECTROLYTE REPLACEMENT PROTOCOL  1 Each Other Q8H    ELECTROLYTE REPLACEMENT PROTOCOL  1 Each Other Q8H    PHARMACY INFORMATION NOTE  1 Each Other DAILY    chlorhexidine (PERIDEX) 0.12 % mouthwash 15 mL  15 mL Oral Q12H    ondansetron (ZOFRAN) injection 1 mg  1 mg IntraVENous Q6H PRN    acetaminophen (TYLENOL) tablet 650 mg  650 mg Oral Q4H PRN    acetaminophen (TYLENOL) suppository 650 mg  650 mg Rectal Q4H PRN    senna-docusate (PERICOLACE) 8.6-50 mg per tablet 2 Tab  2 Tab Oral QHS    bisacodyl (DULCOLAX) tablet 5 mg  5 mg Oral DAILY PRN    bisacodyl (DULCOLAX) suppository 10 mg  10 mg Rectal DAILY PRN    pantoprazole (PROTONIX) 40 mg in sodium chloride 0.9 % 10 mL injection  40 mg IntraVENous Q12H    albuterol (PROVENTIL VENTOLIN) nebulizer solution 2.5 mg  2.5 mg Nebulization G1F PRN    folic acid (FOLVITE) 1 mg, thiamine (B-1) 100 mg in 0.9% sodium chloride 50 mL ivpb   IntraVENous DAILY      Data:      [x] REVIEWED  Recent Results (from the past 24 hour(s))   GLUCOSE, POC    Collection Time: 05/10/17  6:51 PM   Result Value Ref Range    Glucose (POC) 165 (H) 70 - 110 mg/dL   GLUCOSE, POC    Collection Time: 05/11/17 12:38 AM   Result Value Ref Range    Glucose (POC) 185 (H) 70 - 110 mg/dL   CALCIUM, IONIZED    Collection Time: 05/11/17  3:40 AM   Result Value Ref Range    Ionized Calcium 1.30 1.12 - 1.32 MMOL/L   MAGNESIUM    Collection Time: 05/11/17  3:40 AM   Result Value Ref Range    Magnesium 2.4 1.6 - 2.6 mg/dL   PHOSPHORUS    Collection Time: 05/11/17  3:40 AM   Result Value Ref Range    Phosphorus 1.5 (L) 2.5 - 4.9 MG/DL   PROTHROMBIN TIME + INR    Collection Time: 05/11/17  3:40 AM   Result Value Ref Range    Prothrombin time 15.7 (H) 11.5 - 15.2 sec    INR 1.3 (H) 0.8 - 1.2     PTT    Collection Time: 05/11/17  3:40 AM   Result Value Ref Range    aPTT 33.3 23.0 - 36.4 SEC   CBC WITH AUTOMATED DIFF    Collection Time: 05/11/17  3:40 AM   Result Value Ref Range    WBC 14.9 (H) 4.6 - 13.2 K/uL    RBC 3.47 (L) 4.20 - 5.30 M/uL    HGB 10.4 (L) 12.0 - 16.0 g/dL    HCT 31.2 (L) 35.0 - 45.0 %    MCV 89.9 74.0 - 97.0 FL    MCH 30.0 24.0 - 34.0 PG    MCHC 33.3 31.0 - 37.0 g/dL    RDW 14.6 (H) 11.6 - 14.5 %    PLATELET 986 211 - 430 K/uL    MPV 10.8 9.2 - 11.8 FL    NEUTROPHILS 86 (H) 40 - 73 %    LYMPHOCYTES 5 (L) 21 - 52 %    MONOCYTES 9 3 - 10 %    EOSINOPHILS 0 0 - 5 %    BASOPHILS 0 0 - 2 %    ABS. NEUTROPHILS 12.7 (H) 1.8 - 8.0 K/UL    ABS. LYMPHOCYTES 0.7 (L) 0.9 - 3.6 K/UL    ABS. MONOCYTES 1.4 (H) 0.05 - 1.2 K/UL    ABS. EOSINOPHILS 0.0 0.0 - 0.4 K/UL    ABS.  BASOPHILS 0.0 0.0 - 0.06 K/UL    DF AUTOMATED     METABOLIC PANEL, BASIC    Collection Time: 05/11/17  3:40 AM   Result Value Ref Range    Sodium 155 (H) 136 - 145 mmol/L    Potassium 3.9 3.5 - 5.5 mmol/L    Chloride 124 (H) 100 - 108 mmol/L    CO2 23 21 - 32 mmol/L    Anion gap 8 3.0 - 18 mmol/L    Glucose 152 (H) 74 - 99 mg/dL    BUN 17 7.0 - 18 MG/DL    Creatinine 1.43 (H) 0.6 - 1.3 MG/DL    BUN/Creatinine ratio 12 12 - 20      GFR est AA 42 (L) >60 ml/min/1.73m2    GFR est non-AA 35 (L) >60 ml/min/1.73m2    Calcium 9.4 8.5 - 10.1 MG/DL   GLUCOSE, POC    Collection Time: 05/11/17  5:50 AM   Result Value Ref Range    Glucose (POC) 93 70 - 110 mg/dL   GLUCOSE, POC    Collection Time: 05/11/17 11:13 AM   Result Value Ref Range    Glucose (POC) 200 (H) 70 - 110 mg/dL   POTASSIUM    Collection Time: 05/11/17  3:04 PM   Result Value Ref Range    Potassium 4.2 3.5 - 5.5 mmol/L

## 2017-05-11 NOTE — DIABETES MGMT
NUTRITIONAL RE-ASSESSMENT GLYCEMIC CONTROL/ PLAN OF CARE     Wiley Huddleston           80 y.o.           5/6/2017                 1. Spontaneous intraparenchymal intracranial hemorrhage, acute ([address]    2. Hypertensive emergency    3. Altered mental status, unspecified altered mental status type    4. Dementia associated with other underlying disease without behavioral disturbance    5. Nausea and vomiting, intractability of vomiting not specified, unspecified vomiting type       INTERVENTIONS/PLAN:   1. Suggest increasing Osmolite 1 kaden tube feeding by 10 ml/hr every 6 hours to goal rate of 60 ml/hr to provide 1526 calories, 64 grams protein, 1214 ml free water/d, closely meeting estimated nutrient needs. 2.  Will monitor tube feeding, labs and weights. ASSESSMENT:   Pt is 78% ideal wt; BMI (calculated): 16.2 kg/m2 (underweight classification). Pt appears thin yet weight is relatively stable since prior admission in 2014. Chart review indicates pt lost weight following her colostomy procedure in 2010 and was not able to gain weight back. Pt is currently on vent/NPO with trickle tube feeding. Labs noted. Pt with hypernatremia. A1C is in prediabetes range. Current blood glucose is in target range. Nursing notes indicate pt with stage 1 sore, sacrum. 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 Diet: NPO with Osmolite 1 kaden tube feeding at 10 ml/hr  Residuals per I and O's - 5 ml 5/11/17  Last BM per I and O's -  5/11/17; brown, watery, loose (colostomy)    No data found.     Medications: [x]                Reviewed   Pertinent:  Folvite, Klor Con,  Corrective Humalog, normal insulin resistant dosing ACHS  IVF:  D5 NS with KCl 40 mEq at 37.4 ml/hr    Most Recent POC Glucose:   Recent Labs 05/11/17   0340  05/10/17   0340  05/09/17   0335   GLU  152*  142*  144*         Labs:   Lab Results   Component Value Date/Time    Hemoglobin A1c 6.1 05/06/2017 07:00 PM     Lab Results   Component Value Date/Time    Sodium 155 05/11/2017 03:40 AM    Potassium 3.9 05/11/2017 03:40 AM    Chloride 124 05/11/2017 03:40 AM    CO2 23 05/11/2017 03:40 AM    Anion gap 8 05/11/2017 03:40 AM    Glucose 152 05/11/2017 03:40 AM    BUN 17 05/11/2017 03:40 AM    Creatinine 1.43 05/11/2017 03:40 AM    Calcium 9.4 05/11/2017 03:40 AM    Magnesium 2.4 05/11/2017 03:40 AM    Phosphorus 1.5 05/11/2017 03:40 AM    Albumin 3.4 05/09/2017 03:35 AM       Anthropometrics: IBW : 54.6 kg (120 lb 5.9 oz), % IBW (Calculated): 78.2 %, BMI (calculated): 15.7  Wt Readings from Last 1 Encounters:   05/11/17 41.4 kg (91 lb 4.3 oz)     5/8/167    Admission weight - 42.7 kg    Last 3 Recorded Weights in this Encounter    05/09/17 0400 05/10/17 0701 05/11/17 0700   Weight: 39.4 kg (86 lb 13.8 oz) 39.2 kg (86 lb 6.7 oz) 41.4 kg (91 lb 4.3 oz)     12/1/14 weight - 45.4 kg  Wt Readings from Last 3 Encounters:   05/11/17 41.4 kg (91 lb 4.3 oz)   05/03/17 41.2 kg (90 lb 12.8 oz)   03/21/17 41.9 kg (92 lb 6.4 oz)     Ht Readings from Last 1 Encounters:   05/06/17 5' 4\" (1.626 m)       Estimated Nutrition Needs:  7644 Kcals/day,   Fluid (ml): 1500 ml  Based on:   [x]          Actual BW    []          ABW   []            Adjusted BW        Nutrition Diagnoses:   Inadequate oral food and beverage intake due to intubation as evidenced by NPO orders. Underweight due to inadequate energy intake as evidenced by BMI of 16.2 kg/m2 and pt is 78% ideal wt. Nutrition Interventions:   Tube feeding advanced  Goal:   1. Met 5/11/17 - Blood glucose will be within target range of  mg/dL by 5/11/1.  2.  New goal 5/11/7 - Tube feeding will meet > 75% estimated energy and protein needs by 5/15/17.   3.  On going 5/11/17 - Weight maintenance (+/- 1-2 kg) or gradual weight gain of 1-2 lbs by 5/18/17.         Nutrition Monitoring and Evaluation      []     Monitor po intake on meal rounds  [x]     Continue inpatient monitoring and intervention  []     Other:      Nutrition Risk:  [x]   High     []  Moderate    []  Minimal/Uncompromised    Alexandria Hawthorne RD, CDE   Office:  [address] [place] [address] Pager:  [phone]

## 2017-05-11 NOTE — PROGRESS NOTES
[facility]  ICU Progress Note      Name: Vero Ferrari   : 1930   MRN: [id number]   Date: 2017 11:29 AM     [x]I have reviewed the flowsheet and previous days notes. Events overnight reviewed and discussed with nursing staff. Vital signs and records reviewed. 2017  No overnight events  WBC down  On [email or web address]  Eyes open, moves L side spontaneously, ? FC on LLE  Tolerated SBT    [x]The patient is unable to give any meaningful history or review of systems because the patient is:  [x]Intubated []Sedated   []Unresponsive      [x]The patient is critically ill on      [x]Mechanical ventilation []Pressors   []BiPAP []                 ROS:Review of systems not obtained due to patient factors.     Medication Review:  · Pressors -  · Sedation -  · Antibiotics -  · Pain -  · GI/ DVT -protonix  · Others (other gtts)-cardene    Safety Bundles: VAP Bundle/ CAUTI/ Severe Sepsis Protocol/ Electrolyte Replacement Protocol    Vital Signs:    Visit Vitals    /63    Pulse 98    Temp 98.9 °F (37.2 °C)    Resp 21    Ht 5' 4\" (1.626 m)    Wt 41.4 kg (91 lb 4.3 oz)    SpO2 100%    BMI 15.67 kg/m2       O2 Device: Endotracheal tube       Temp (24hrs), Av.2 °F (37.3 °C), Min:97.7 °F (36.5 °C), Max:100.1 °F (37.8 °C)       Intake/Output:   Last shift:      701 - 1900  In: 196.5 [I.V.:136.5]  Out: -   Last 3 shifts: 1901 -  07  In: 2207.4 [I.V.:1936.4]  Out: 1400 [Urine:1120]    Intake/Output Summary (Last 24 hours) at 17 1109  Last data filed at 17 0900   Gross per 24 hour   Intake          1497.19 ml   Output               80 ml   Net          1417.19 ml       Ventilator Settings:  Ventilator  Mode: Pressure support, Spontaneous  Respiratory Rate  Resp Rate Observed: 20  Back-Up Rate: 12  Insp Time (sec): 0.9 sec  I:E Ratio: 1:5.58  Ventilator Volumes  Vt Set (ml): 350 ml  Vt Exhaled (Machine Breath) (ml): 421 ml  Vt Spont (ml): 306 ml  Ve Observed (l/min): 6.39 l/min  Ventilator Pressures  Pressure Support (cm H2O): 7 cm H2O  PIP Observed (cm H2O): 13 cm H2O  Plateau Pressure (cm H2O): 15 cm H2O  MAP (cm H2O): 8.3  PEEP/VENT (cm H2O): 5 cm H20  Auto PEEP Observed (cm H2O):  (unable to obtain)    Physical Exam:    General/Neuro: Intubated, off sedation, moves L UE/LE spontaneously, awake-no tracking, WD RLE with NS, L pupil pinpoint, ?  Moves LLE when asked  HEENT:  Anicteric sclerae; ETT in place  Resp:  Symmetrical chest expansion, adequate airway entry; no rales/ wheezing/ rhonchi noted  CV:  Regular, no m  GI:  Abdomen soft, non-tender; (+) active bowel sounds, +colostomy  Extremities:  +2 pulses on all extremities; SCD in place  Skin:  Warm; no rashes/ lesions noted    DATA:     Current Facility-Administered Medications   Medication Dose Route Frequency    potassium phosphate 12 mmol in 0.9% sodium chloride 250 mL infusion   IntraVENous ONCE    dextrose 5% - 0.9% NaCl with KCl 40 mEq/L infusion   IntraVENous CONTINUOUS    niCARdipine (CARDENE) 25 mg in 0.9% sodium chloride 250 mL infusion  0-15 mg/hr IntraVENous TITRATE    labetalol (NORMODYNE;TRANDATE) 20 mg/4 mL (5 mg/mL) injection 10 mg  10 mg IntraVENous Q4H PRN    amLODIPine (NORVASC) tablet 10 mg  10 mg Oral DAILY    insulin lispro (HUMALOG) injection   SubCUTAneous Q6H    glucose chewable tablet 16 g  4 Tab Oral PRN    glucagon (GLUCAGEN) injection 1 mg  1 mg IntraMUSCular PRN    dextrose (D50W) injection syrg 12.5-25 g  25-50 mL IntraVENous PRN    fentaNYL citrate (PF) injection 50 mcg  50 mcg IntraVENous Q1H PRN    ELECTROLYTE REPLACEMENT PROTOCOL  1 Each Other Q8H    ELECTROLYTE REPLACEMENT PROTOCOL  1 Each Other Q8H    ELECTROLYTE REPLACEMENT PROTOCOL  1 Each Other Q8H    PHARMACY INFORMATION NOTE  1 Each Other DAILY    chlorhexidine (PERIDEX) 0.12 % mouthwash 15 mL  15 mL Oral Q12H    ondansetron (ZOFRAN) injection 1 mg  1 mg IntraVENous Q6H PRN    acetaminophen (TYLENOL) tablet 650 mg  650 mg Oral Q4H PRN    acetaminophen (TYLENOL) suppository 650 mg  650 mg Rectal Q4H PRN    senna-docusate (PERICOLACE) 8.6-50 mg per tablet 2 Tab  2 Tab Oral QHS    bisacodyl (DULCOLAX) tablet 5 mg  5 mg Oral DAILY PRN    bisacodyl (DULCOLAX) suppository 10 mg  10 mg Rectal DAILY PRN    pantoprazole (PROTONIX) 40 mg in sodium chloride 0.9 % 10 mL injection  40 mg IntraVENous Q12H    albuterol (PROVENTIL VENTOLIN) nebulizer solution 2.5 mg  2.5 mg Nebulization H3M PRN    folic acid (FOLVITE) 1 mg, thiamine (B-1) 100 mg in 0.9% sodium chloride 50 mL ivpb   IntraVENous DAILY         Labs: Results:       Chemistry Recent Labs      05/11/17   0340  05/10/17   1025  05/10/17   0340   05/09/17   0335   GLU  152*   --   142*   --   144*   NA  155*   --   153*   --   151*   K  3.9  4.1  4.0   < >  3.5   CL  124*   --   118*   --   116*   CO2  23   --   23   --   24   BUN  17   --   12   --   11   CREA  1.43*   --   1.03   --   1.03   CA  9.4   --   9.7   --   9.7   AGAP  8   --   12   --   11   BUCR  12   --   12   --   11*   AP   --    --    --    --   61   TP   --    --    --    --   6.9   ALB   --    --    --    --   3.4   GLOB   --    --    --    --   3.5   AGRAT   --    --    --    --   1.0    < > = values in this interval not displayed.       CBC w/Diff Recent Labs      05/11/17   0340  05/10/17   0340 05/09/17   0335   WBC  14.9*  16.5*  18.7*   RBC  3.47*  3.68*  3.42*   HGB  10.4*  11.2*  10.3*   HCT  31.2*  33.1*  30.4*   PLT  190  180  162   GRANS  86*  89*  89*   LYMPH  5*  4*  3*   EOS  0  0  0      Coagulation Recent Labs      05/11/17   0340  05/10/17   0340   PTP  15.7*  14.8   INR  1.3*  1.2   APTT  33.3  33.9       Liver Enzymes Recent Labs      05/09/17   0335   TP  6.9   ALB  3.4   AP  61   SGOT  38*      ABG Lab Results   Component Value Date/Time    PHI 7.466 (H) 05/10/2017 01:22 PM    PCO2I 30.9 (LL) 05/10/2017 01:22 PM    PO2I 149 (H) 05/10/2017 01:22 PM    HCO3I 22.4 05/10/2017 01:22 PM    FIO2I 30 05/10/2017 01:22 PM      Microbiology No results for input(s): CULT in the last 72 hours. Telemetry: [x]Sinus tach []A-flutter []Paced    []A-fib []Multiple PVCs                    Imaging:  [x]I have personally reviewed the patients radiographs  []Radiographs reviewed with radiologist   []No change from prior, tubes and lines in adequate position  []Improved   []Worsening    5/10/17 CXR   1. Lines and tubes in good position. 2. No acute cardiopulmonary disease     5/9/17 CT head  1. Large intraparenchymal hemorrhage centered in the left basal ganglia,  slightly improved from the prior study, with improved right to left midline  shift. Persistent intraventricular extension with improvement in the left  lateral ventricle and increased/new hemorrhage in the right ventricle. A  possible small new areas of subarachnoid hemorrhage. 5/6/17 CT head    There is a large intraparenchymal hemorrhage in the region the left basal  ganglia with mass effect and midline shift to the right by 6 mm. There is  significant extension of this hemorrhage into the ventricular system. There is  dilatation of the right lateral ventricle. Significant small vessel ischemic  disease seen.       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, some improvement repeat CT 5/9 (see above report)  - Neurosurgery and neurology following  - No plans for surgical intervention-poor prognosis per NS  - BP control  - ICH orderset  · Pulm: VDRF due to above  - SBT today-tolerated  - Vent orderset  - CXR stable  · CV: HTN  - SBP goal<140  - On norvasc  - cardene gtt, labetalol prn  - Cont IVF  · GI  - Continue protonix  - Advance TF  · Renal/Metabolic-hypomagnesemia-resolved  - Replace lytes per protocol  ·   - cont purewick or straight cath prn for now, singh can be replaced if needed  · ID  - monitor temps, leukocytosis improving        PLAN:   · Prophylaxis - DVT-SCD, GI-protonix  · Discussed in interdisciplinary rounds  · FULL CODE (see ACP on file)  · Palliative care following, continue ongoing family discussions          The patient is: [] acutely ill Risk of deterioration: [] moderate    [x] critically ill  [x] high     [x]See my orders for details    My assessment/plan was discussed with:  [x]nursing []PT/OT    [x]respiratory therapy [x]   []family []         YENIFER Hudson

## 2017-05-11 NOTE — PROGRESS NOTES
Problem: Ventilator Management  Goal: *Adequate oxygenation and ventilation  0716  Received pt on ventilator this am. Settings: AC/VC+, 12/350/30%/+5. Sats-100%, HR-99, RR-18.     0910 SBT started. PS-7, PEEP-+5.     1045 SBT stopped. Pt passed. Pts family still in the process of deciding about her care, therefore, she will not be extubated at this time.

## 2017-05-11 NOTE — CDMP QUERY
In order to reflect the patients  low  BMI , please consider the following diagnosis of  underweight    Patients height   5'  4\"   Patients weight   91 lbs  BMI  15.7      =>  =>Other Explanation of clinical findings  =>Unable to Determine (no explanation of clinical findings)      Please clarify and document your clinical opinion in the progress notes and discharge summary including the definitive and/or presumptive diagnosis, (suspected or probable), related to the above clinical findings. Please include clinical findings supporting your diagnosis. If you DECLINE this query or would like to communicate with [facility], please utilize the \"[facility] message box\" at the TOP of the Progress Note on the right.       Thank you,  Niharika Cantu RN [facility]  [phone]

## 2017-05-11 NOTE — PROGRESS NOTES
[facility]  Hospitalist Division    Daily progress Note    Patient: Himanshu Yoder MRN: [id number]  CSN: [id number]    YOB: 1930  Age: 80 y.o.   Sex: female    DOA: 5/6/2017 LOS:  LOS: 5 days                    Subjective:     CC: ICH     Somnolent but responds t pain , intubated   Moving left ext randomly    Objective:      Visit Vitals    /65    Pulse 89    Temp 98.4 °F (36.9 °C)    Resp 15    Ht 5' 4\" (1.626 m)    Wt 41.4 kg (91 lb 4.3 oz)    SpO2 100%    BMI 15.67 kg/m2       Physical Exam:  NC/AT  NO JVD,TMG  S1/S2 RRR  CTAB, NO WHEEZING  NT,ND, NABS  NO EDEMA  MS 0/5 on Rt, moving Lt randomly         Intake and Output:  Current Shift:  05/11 0701 - 05/11 1900  In: 694.5 [I.V.:504.5]  Out: -   Last three shifts:  05/09 1901 - 05/11 0700  In: 2207.4 [I.V.:1936.4]  Out: 1400 [Urine:1120]    Recent Results (from the past 24 hour(s))   GLUCOSE, POC    Collection Time: 05/10/17  6:51 PM   Result Value Ref Range    Glucose (POC) 165 (H) 70 - 110 mg/dL   GLUCOSE, POC    Collection Time: 05/11/17 12:38 AM   Result Value Ref Range    Glucose (POC) 185 (H) 70 - 110 mg/dL   CALCIUM, IONIZED    Collection Time: 05/11/17  3:40 AM   Result Value Ref Range    Ionized Calcium 1.30 1.12 - 1.32 MMOL/L   MAGNESIUM    Collection Time: 05/11/17  3:40 AM   Result Value Ref Range    Magnesium 2.4 1.6 - 2.6 mg/dL   PHOSPHORUS    Collection Time: 05/11/17  3:40 AM   Result Value Ref Range    Phosphorus 1.5 (L) 2.5 - 4.9 MG/DL   PROTHROMBIN TIME + INR    Collection Time: 05/11/17  3:40 AM   Result Value Ref Range    Prothrombin time 15.7 (H) 11.5 - 15.2 sec    INR 1.3 (H) 0.8 - 1.2     PTT    Collection Time: 05/11/17  3:40 AM   Result Value Ref Range    aPTT 33.3 23.0 - 36.4 SEC   CBC WITH AUTOMATED DIFF    Collection Time: 05/11/17  3:40 AM   Result Value Ref Range    WBC 14.9 (H) 4.6 - 13.2 K/uL    RBC 3.47 (L) 4.20 - 5.30 M/uL    HGB 10.4 (L) 12.0 - 16.0 g/dL    HCT 31.2 (L) 35.0 - 45.0 %    MCV 89.9 74.0 - 97.0 FL    MCH 30.0 24.0 - 34.0 PG    MCHC 33.3 31.0 - 37.0 g/dL    RDW 14.6 (H) 11.6 - 14.5 %    PLATELET 120 983 - 346 K/uL    MPV 10.8 9.2 - 11.8 FL    NEUTROPHILS 86 (H) 40 - 73 %    LYMPHOCYTES 5 (L) 21 - 52 %    MONOCYTES 9 3 - 10 %    EOSINOPHILS 0 0 - 5 %    BASOPHILS 0 0 - 2 %    ABS. NEUTROPHILS 12.7 (H) 1.8 - 8.0 K/UL    ABS. LYMPHOCYTES 0.7 (L) 0.9 - 3.6 K/UL    ABS. MONOCYTES 1.4 (H) 0.05 - 1.2 K/UL    ABS. EOSINOPHILS 0.0 0.0 - 0.4 K/UL    ABS.  BASOPHILS 0.0 0.0 - 0.06 K/UL    DF AUTOMATED     METABOLIC PANEL, BASIC    Collection Time: 05/11/17  3:40 AM   Result Value Ref Range    Sodium 155 (H) 136 - 145 mmol/L    Potassium 3.9 3.5 - 5.5 mmol/L    Chloride 124 (H) 100 - 108 mmol/L    CO2 23 21 - 32 mmol/L    Anion gap 8 3.0 - 18 mmol/L    Glucose 152 (H) 74 - 99 mg/dL    BUN 17 7.0 - 18 MG/DL    Creatinine 1.43 (H) 0.6 - 1.3 MG/DL    BUN/Creatinine ratio 12 12 - 20      GFR est AA 42 (L) >60 ml/min/1.73m2    GFR est non-AA 35 (L) >60 ml/min/1.73m2    Calcium 9.4 8.5 - 10.1 MG/DL   GLUCOSE, POC    Collection Time: 05/11/17  5:50 AM   Result Value Ref Range    Glucose (POC) 93 70 - 110 mg/dL   GLUCOSE, POC    Collection Time: 05/11/17 11:13 AM   Result Value Ref Range    Glucose (POC) 200 (H) 70 - 110 mg/dL   POTASSIUM    Collection Time: 05/11/17  3:04 PM   Result Value Ref Range    Potassium 4.2 3.5 - 5.5 mmol/L         Current Facility-Administered Medications:     dextrose 5% - 0.9% NaCl with KCl 40 mEq/L infusion, , IntraVENous, CONTINUOUS, Krystal Grant MD, Last Rate: 37.4 mL/hr at 05/11/17 0838    niCARdipine (CARDENE) 25 mg in 0.9% sodium chloride 250 mL infusion, 0-15 mg/hr, IntraVENous, TITRATE, YENIFER Mijares, Stopped at 05/11/17 1318    labetalol (NORMODYNE;TRANDATE) 20 mg/4 mL (5 mg/mL) injection 10 mg, 10 mg, IntraVENous, Q4H PRN, YENIFER Mijares, 10 mg at 05/10/17 1504    amLODIPine (NORVASC) tablet 10 mg, 10 mg, Oral, Peggy Orellana MD, 10 mg at 05/11/17 1118    insulin lispro (HUMALOG) injection, , SubCUTAneous, Q6H, Dagmar Valdez MD, 4 Units at 05/11/17 1208    glucose chewable tablet 16 g, 4 Tab, Oral, PRN, Dagmar Valdez MD    glucagon (GLUCAGEN) injection 1 mg, 1 mg, IntraMUSCular, PRN, Dagmar Valdez MD    dextrose (D50W) injection syrg 12.5-25 g, 25-50 mL, IntraVENous, PRN, Dagmar Valdez MD    fentaNYL citrate (PF) injection 50 mcg, 50 mcg, IntraVENous, Q1H PRN, Coy Mayer MD, 50 mcg at 05/06/17 2038  [place], 1 Each, Other, Dwain Simpson MD, 1 Each at 05/11/17 1400  [place], 1 Each, Other, Dwain Simpson MD, 1 Each at 05/11/17 1400  [place], 1 Each, Other, Dwain Simpson MD, 1 Each at 05/11/17 1400    PHARMACY INFORMATION NOTE, 1 Each, Other, Alee Munoz MD, 1 Each at 05/11/17 0600    chlorhexidine (PERIDEX) 0.12 % mouthwash 15 mL, 15 mL, Oral, Q12H, Dagmar Valdez MD, 15 mL at 05/11/17 0822    ondansetron (ZOFRAN) injection 1 mg, 1 mg, IntraVENous, Q6H PRN, Dagmar Valdez MD    acetaminophen (TYLENOL) tablet 650 mg, 650 mg, Oral, Q4H PRN, Dagmar Valdez MD    acetaminophen (TYLENOL) suppository 650 mg, 650 mg, Rectal, Q4H PRN, Dagmar Valdez MD    senna-docusate (PERICOLACE) 8.6-50 mg per tablet 2 Tab, 2 Tab, Oral, QHS, Dagmar Valdez MD, 2 Tab at 05/10/17 2119    bisacodyl (DULCOLAX) tablet 5 mg, 5 mg, Oral, DAILY PRN, Dagmar Valdez MD    bisacodyl (DULCOLAX) suppository 10 mg, 10 mg, Rectal, DAILY PRN, Dagmar Valdez MD    pantoprazole (PROTONIX) 40 mg in sodium chloride 0.9 % 10 mL injection, 40 mg, IntraVENous, Q12H, Joy Albarado MD, 40 mg at 05/11/17 9110    albuterol (PROVENTIL VENTOLIN) nebulizer solution 2.5 mg, 2.5 mg, Nebulization, Q4H PRN, Dagmar Valdez MD    folic acid (FOLVITE) 1 mg, thiamine (B-1) 100 mg in 0.9% sodium chloride 50 mL ivpb, , IntraVENous, DAILY, Davey Kayser, MD    Lab Results   Component Value Date/Time    Glucose 152 05/11/2017 03:40 AM    Glucose 142 05/10/2017 03:40 AM    Glucose 144 05/09/2017 03:35 AM    Glucose 127 05/08/2017 03:50 AM    Glucose 220 05/07/2017 04:15 AM        Assessment/Plan     Active Problems:    Cerebral hemorrhage ([facility] [address] (5/6/2017)      Dementia (5/9/2017)      Vomiting (5/9/2017)      Altered mental status (5/9/2017)       Left basal ganglia ICH with a 6 mm midline shift  Neurosurgery and neurology rec noted  Repeat CT reviewed   On Norvasc and PRN Labetalol with SBP goal of <217  Metabolic encephalopathy related to cerebral hemorrhage  Resp failure 2/2 above   HTN  Hypothyroidism   Hypernatremia   Underweight    Overall prognosis is very poor  Cont supportive care          Foreign Pabon MD  5/11/2017, 4:55 PM

## 2017-05-12 NOTE — PROGRESS NOTES
05/12/17 1544   Airway Procedures   $$ Airway Procedures Sputum Induction  (Sample collected in [employer] trap and sent to lab for C+S)

## 2017-05-12 NOTE — ROUTINE PROCESS
0700 bedside verbal report from [employer] received. 1420 straight cath performed, urine sample sent to lab   Bedside and Verbal shift change report given to Tim (oncoming nurse) by Jolene Gray RN   (offgoing nurse). Report included the following information SBAR and Kardex.

## 2017-05-12 NOTE — PROGRESS NOTES
Neurology Progress Note    Admit Date: 5/6/2017  Length of Stay: 6  Primary Care: Ricardo Puri DO       Assessment:    Principle Problem: <principal problem not specified>     Problem List: Active Problems:    Cerebral hemorrhage ([facility] [address] (5/6/2017)      Dementia (5/9/2017)      Vomiting (5/9/2017)      Altered mental status (5/9/2017)        Plan:    Large Left BG ICH  Neurosurgery evaluated and did not feel they had a role. Repeat CT head on 5/8/17 shows some modest improvement in B.G. hemorrhage size and more hemorrhage in the ventricles. -I again do not think there is a reasonable chance for independent living. If she can survive the hospital course I think there will certainly be right sided paralysis. I am not certain if there would be language impairment or not and I updated those expectations with the daughter today. DVT prophylaxis. Interim History: She seems a little easier to arouse in the last 2 days. No major changes. Allergies: Allergies   Allergen Reactions    Penicillins Not Reported This Time         Vital Signs:   Visit Vitals    /67    Pulse 90    Temp 98.6 °F (37 °C)    Resp 22    Ht 5' 4\" (1.626 m)    Wt 43.2 kg (95 lb 3.8 oz)    SpO2 100%    BMI 16.35 kg/m2        Neurological examination:   · Neuro: Not following commands. + VORs, surgical pupil on the right, pinpoint on the left and neither have ever reacted for me. . opens eyes to mild stimulus. Moves left arm and leg. Does not move right arm to noxious stimuli. Minimal withdrawal of right leg to noxious stimuli. Does not blink to VT. Review of Systems: unable to obtain.                                                        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    Thyroid disease       FH:   Family History   Problem Relation Age of Onset    Heart Attack Father     Diabetes Mother       SH:   Social History     Social History    Marital status:      Spouse name: N/A    Number of children: N/A    Years of education: N/A     Social History Main Topics    Smoking status: Former Smoker     Packs/day: 0.25     Years: 10.00     Quit date: 1992    Smokeless tobacco: Never Used    Alcohol use No    Drug use: No    Sexual activity: Not Asked     Other Topics Concern    None     Social History Narrative          Medications:    [x] REVIEWED  Current Facility-Administered Medications   Medication Dose Route Frequency    metoprolol tartrate (LOPRESSOR) tablet 12.5 mg  12.5 mg Oral Q12H    insulin detemir (LEVEMIR) injection 5 Units  5 Units SubCUTAneous Q24H    fentaNYL citrate (PF) injection 50 mcg  50 mcg IntraVENous Q1H PRN    [START ON 5/13/2017] levoFLOXacin (LEVAQUIN) 250 mg in D5W IVPB  250 mg IntraVENous Q24H    niCARdipine (CARDENE) 25 mg in 0.9% sodium chloride 250 mL infusion  0-15 mg/hr IntraVENous TITRATE    labetalol (NORMODYNE;TRANDATE) 20 mg/4 mL (5 mg/mL) injection 10 mg  10 mg IntraVENous Q4H PRN    amLODIPine (NORVASC) tablet 10 mg  10 mg Oral DAILY    insulin lispro (HUMALOG) injection   SubCUTAneous Q6H    glucose chewable tablet 16 g  4 Tab Oral PRN    glucagon (GLUCAGEN) injection 1 mg  1 mg IntraMUSCular PRN    dextrose (D50W) injection syrg 12.5-25 g  25-50 mL IntraVENous PRN    ELECTROLYTE REPLACEMENT PROTOCOL  1 Each Other Q8H    ELECTROLYTE REPLACEMENT PROTOCOL  1 Each Other Q8H    ELECTROLYTE REPLACEMENT PROTOCOL  1 Each Other Q8H    PHARMACY INFORMATION NOTE  1 Each Other DAILY    chlorhexidine (PERIDEX) 0.12 % mouthwash 15 mL  15 mL Oral Q12H    ondansetron (ZOFRAN) injection 1 mg  1 mg IntraVENous Q6H PRN    acetaminophen (TYLENOL) tablet 650 mg  650 mg Oral Q4H PRN    acetaminophen (TYLENOL) suppository 650 mg  650 mg Rectal Q4H PRN    senna-docusate (PERICOLACE) 8.6-50 mg per tablet 2 Tab  2 Tab Oral QHS    bisacodyl (DULCOLAX) tablet 5 mg  5 mg Oral DAILY PRN    bisacodyl (DULCOLAX) suppository 10 mg  10 mg Rectal DAILY PRN    pantoprazole (PROTONIX) 40 mg in sodium chloride 0.9 % 10 mL injection  40 mg IntraVENous Q12H    albuterol (PROVENTIL VENTOLIN) nebulizer solution 2.5 mg  2.5 mg Nebulization L1Z PRN    folic acid (FOLVITE) 1 mg, thiamine (B-1) 100 mg in 0.9% sodium chloride 50 mL ivpb   IntraVENous DAILY      Data:      [x] REVIEWED  Recent Results (from the past 24 hour(s))   GLUCOSE, POC    Collection Time: 05/11/17 11:21 PM   Result Value Ref Range    Glucose (POC) 241 (H) 70 - 110 mg/dL   AMYLASE    Collection Time: 05/12/17  2:45 AM   Result Value Ref Range    Amylase 217 (H) 25 - 115 U/L   HEPATIC FUNCTION PANEL    Collection Time: 05/12/17  2:45 AM   Result Value Ref Range    Protein, total 6.5 6.4 - 8.2 g/dL    Albumin 2.7 (L) 3.4 - 5.0 g/dL    Globulin 3.8 2.0 - 4.0 g/dL    A-G Ratio 0.7 (L) 0.8 - 1.7      Bilirubin, total 0.4 0.2 - 1.0 MG/DL    Bilirubin, direct 0.1 0.0 - 0.2 MG/DL    Alk.  phosphatase 68 45 - 117 U/L    AST (SGOT) 35 15 - 37 U/L    ALT (SGPT) 32 13 - 56 U/L   LIPASE    Collection Time: 05/12/17  2:45 AM   Result Value Ref Range    Lipase 387 73 - 393 U/L   MAGNESIUM    Collection Time: 05/12/17  2:45 AM   Result Value Ref Range    Magnesium 2.2 1.6 - 2.6 mg/dL   PHOSPHORUS    Collection Time: 05/12/17  2:45 AM   Result Value Ref Range    Phosphorus 2.5 2.5 - 4.9 MG/DL   PROTHROMBIN TIME + INR    Collection Time: 05/12/17  2:45 AM   Result Value Ref Range    Prothrombin time 15.2 11.5 - 15.2 sec    INR 1.3 (H) 0.8 - 1.2     PTT    Collection Time: 05/12/17  2:45 AM   Result Value Ref Range    aPTT 27.5 23.0 - 67.0 SEC   METABOLIC PANEL, BASIC    Collection Time: 05/12/17  2:45 AM   Result Value Ref Range    Sodium 157 (H) 136 - 145 mmol/L    Potassium 4.8 3.5 - 5.5 mmol/L    Chloride 125 (H) 100 - 108 mmol/L    CO2 22 21 - 32 mmol/L    Anion gap 10 3.0 - 18 mmol/L    Glucose 181 (H) 74 - 99 mg/dL    BUN 18 7.0 - 18 MG/DL    Creatinine 1.48 (H) 0.6 - 1.3 MG/DL    BUN/Creatinine ratio 12 12 - 20      GFR est AA 41 (L) >60 ml/min/1.73m2    GFR est non-AA 33 (L) >60 ml/min/1.73m2    Calcium 9.3 8.5 - 10.1 MG/DL   CBC WITH AUTOMATED DIFF    Collection Time: 05/12/17  4:00 AM   Result Value Ref Range    WBC 14.7 (H) 4.6 - 13.2 K/uL    RBC 3.64 (L) 4.20 - 5.30 M/uL    HGB 10.9 (L) 12.0 - 16.0 g/dL    HCT 32.7 (L) 35.0 - 45.0 %    MCV 89.8 74.0 - 97.0 FL    MCH 29.9 24.0 - 34.0 PG    MCHC 33.3 31.0 - 37.0 g/dL    RDW 14.9 (H) 11.6 - 14.5 %    PLATELET 629 752 - 069 K/uL    MPV 10.8 9.2 - 11.8 FL    NEUTROPHILS 86 (H) 40 - 73 %    LYMPHOCYTES 5 (L) 21 - 52 %    MONOCYTES 9 3 - 10 %    EOSINOPHILS 0 0 - 5 %    BASOPHILS 0 0 - 2 %    ABS. NEUTROPHILS 12.7 (H) 1.8 - 8.0 K/UL    ABS. LYMPHOCYTES 0.8 (L) 0.9 - 3.6 K/UL    ABS. MONOCYTES 1.3 (H) 0.05 - 1.2 K/UL    ABS. EOSINOPHILS 0.0 0.0 - 0.4 K/UL    ABS.  BASOPHILS 0.0 0.0 - 0.06 K/UL    DF AUTOMATED     AMMONIA    Collection Time: 05/12/17  5:30 AM   Result Value Ref Range    Ammonia 24 11 - 32 UMOL/L   CALCIUM, IONIZED    Collection Time: 05/12/17  5:30 AM   Result Value Ref Range    Ionized Calcium 1.25 1.12 - 1.32 MMOL/L   GLUCOSE, POC    Collection Time: 05/12/17  5:54 AM   Result Value Ref Range    Glucose (POC) 150 (H) 70 - 110 mg/dL   OCCULT BLOOD, STOOL    Collection Time: 05/12/17  8:00 AM   Result Value Ref Range    Occult blood, stool NEGATIVE  NEG     GLUCOSE, POC    Collection Time: 05/12/17 11:14 AM   Result Value Ref Range    Glucose (POC) 345 (H) 70 - 110 mg/dL   GLUCOSE, POC    Collection Time: 05/12/17 11:15 AM   Result Value Ref Range    Glucose (POC) 299 (H) 70 - 110 mg/dL   CULTURE, BLOOD    Collection Time: 05/12/17 12:43 PM   Result Value Ref Range    Special Requests: PERIPHERAL      Culture result: PENDING    LACTIC ACID, PLASMA    Collection Time: 05/12/17 12:43 PM   Result Value Ref Range    Lactic acid 2.3 (HH) 0.4 - 2.0 MMOL/L   URINALYSIS W/MICROSCOPIC    Collection Time: 05/12/17  2:20 PM   Result Value Ref Range    Color YELLOW      Appearance CLOUDY      Specific gravity 1.013 1.005 - 1.030      pH (UA) 5.0 5.0 - 8.0      Protein NEGATIVE  NEG mg/dL    Glucose 250 (A) NEG mg/dL    Ketone NEGATIVE  NEG mg/dL    Bilirubin NEGATIVE  NEG      Blood MODERATE (A) NEG      Urobilinogen 0.2 0.2 - 1.0 EU/dL    Nitrites NEGATIVE  NEG      Leukocyte Esterase LARGE (A) NEG      WBC >100 (H) 0 - 4 /hpf    RBC 4 to 10 0 - 5 /hpf    Epithelial cells FEW 0 - 5 /lpf    Bacteria 3+ (A) NEG /hpf   LACTIC ACID, PLASMA    Collection Time: 05/12/17  4:30 PM   Result Value Ref Range    Lactic acid 1.9 0.4 - 2.0 MMOL/L   GLUCOSE, POC    Collection Time: 05/12/17  5:07 PM   Result Value Ref Range    Glucose (POC) 225 (H) 70 - 110 mg/dL

## 2017-05-12 NOTE — PROGRESS NOTES
05/12/17 1200   Weaning Parameters   Spontaneous Breathing Trial Complete Yes  (Low tidal volume and minute ventilation)

## 2017-05-12 NOTE — PROGRESS NOTES
Reason for Renal Dosing:  Per Renal Dosing Policy    Patient clinical status and labs ordered/reviewed. Pt Weight Weight: 43.2 kg (95 lb 3.8 oz)   Serum Creatinine Lab Results   Component Value Date/Time    Creatinine 1.48 05/12/2017 02:45 AM    Creatinine (POC) 1.0 12/19/2013 09:59 AM       Creatinine Clearance Estimated Creatinine Clearance: 18.6 mL/min (based on Cr of 1.48). BUN Lab Results   Component Value Date/Time    BUN 18 05/12/2017 02:45 AM    BUN (POC) 10 04/07/2010 05:19 PM       WBC Lab Results   Component Value Date/Time    WBC 14.7 05/12/2017 04:00 AM      Temperature Temp: 98.6 °F (37 °C)   HR Pulse (Heart Rate): 80     BP BP: 123/56           Drug type: Antibiotic indicated for Urinary Tract Infection    Drug/dose: Levofloxacin 500 mg every 24 hours was adjusted to Levofloxacin 500 mg once, then 250 mg every 24 hours     Continue to monitor.     Signed Ed Ill, PHARMD  Date 5/12/2017  Time 4:27 PM

## 2017-05-12 NOTE — PROGRESS NOTES
Formerly named [facility]: [phone] ([phone])  [facility]: [phone]   [facility]: [phone]    Patient Name: Vero Ferrari  YOB: 1930    Date of Initial Consult: 5-8-17  Reason for Consult: Care Decisions  Requesting Provider: Dr Erica Stahl   Primary Care Physician: Wendy Larson., DO      SUMMARY:   Vero Ferrari is a 80y.o. year old with a past history of colon cancer w/colostomy, hypertension, hypothyroidism, CKD stage 3,  who was admitted on 5/6/2017 from ER/Home with a diagnosis of AMS and  weakness, found to have CVA. Current medical issues leading to Palliative Medicine involvement include: patient was intubated in ER, imaging revealed left sided hemorrhage centered in the basal ganglia with significant interventricular extension and left to right shift, asked to support family to establish goals of care. PALLIATIVE DIAGNOSES:   1. Altered Mental Status  2. Vomiting  3. ICH  4. Dementia       PLAN:   1. Walt Aly NP and I met with the patient, she is intubated, did not open eyes during our visit, but nurses advise she has been restless, moving her left hand, trying to touch ETT, her colostomy. We returned to ICU when Dtr arrived, she did not want to talk more than at the bedside, voicing that she finds talking about how she is doing too distressing. 2. AMS-not following commands, dtr is pleased that she is still alive, does not want to review prognosis for independent life. 3. Vomiting-explained that with the injury to the brain there is often complaints of head ache and sometime nausea and vomiting. Explained that she was intubated to protect her airway, that it did not appear that she was unable to breath, but was believed to be too altered to keep her airway patent especially when she was having emesis. 4. ICH-neurology notes reviewed, had been shared yesterday with dtr.  She does not want to discuss details at this time.  5. Dementia-family shared that patient had been having some short term memory loss for about 2 yrs, that she lacked insight into her own limitations, wanting to live alone, refusing outside hired help but was unable to do her IADLS-cleaning, shopping, bills, appointments. She asked the same questions repeatedly. Shared that given her underlying dementia her stroke was likely going to cause more deficits than for those with normal brain functioning. 6. Goals of Care and Code Status- code remains FULL CODE, dtr was very withdrawn, declines to discuss her mother's wishes, or the possibility that she is uncomfortable/suffering. She shared that her step sister Julianne Phelps was flying in tomorrow around 11am, that she wants her to see their mother the way she is so she can be part of the process. She voiced that she will be the one to make the decision, as she is the mPOA but she does not want it to be all her burden. She may not have a clear understanding of the process, she asked if the ETT would be removed at day 7, explained to her again that patient was not demonstrating that she could protect her airway and so extubation now would be for her comfort and not because she was improved, that if she wanted to continue full aggressive measures she would move forward with [facility] and PEG and the decision would be made around day 7 and then surgery would be scheduled. Did tell her that she could honor her mother's AMD and allow her to be comfortable and remove the ETT tomorrow, but she is not willing to discuss this. She said her spouse was back in [facility], would encourage her to have spouse present as he appeared to understand and support her. 7. Initial consult note routed to primary continuity provider  8. Communicated plan of care with: Palliative IDT, ICU team, Attending.        GOALS OF CARE:     [====Goals of Care====]  GOALS OF CARE:  Patient / health care proxy stated goals:       TREATMENT PREFERENCES:   Code Status: Full Code    Advance Care Planning:  Advance Care Planning 5/7/2017   Patient's Healthcare Decision Maker is: Named in scanned ACP document   Primary Decision Maker Name -   Primary Decision Maker Phone Number -   Confirm Advance Directive Yes, not on file       Other:    The palliative care team has discussed with patient / health care proxy about goals of care / treatment preferences for patient.  [====Goals of Care====]    Advance Care Planning 5/7/2017   Patient's Healthcare Decision Maker is: Named in scanned ACP document   Primary Decision Maker Name -   Primary Decision Maker Phone Number -   Confirm Advance Directive Yes, not on file      Stef [phone]     HISTORY:     History obtained from: Chart    CHIEF COMPLAINT: AMS and Vomiting with weakness    HPI/SUBJECTIVE:  99.8, 95,135/68, 18 intubated at 100%, wt 86lb. voiding, Colostomy 170ml. MAR-Nebs, Norvasc, Folvite, Labetalol, Cardene, Protonix, Pericolace, has not rec'd any Propofol or Fentanyl  LABS-WBC 18.7->16.5->14.9, H&H 10.3 & 30.4, Plat 162, INR 1.2, BUN/Creat 11/1.03->17/1.43, Ammonia 32, HgbA1c 6.1, Albumin 3.4, TSH 0.88  Head CT-Large intraparenchymal hemorrhage centered in the left basal ganglia, slightly improved from the prior study, with improved right to left midline shift. Persistent intraventricular extension with improvement in the left  lateral ventricle and increased/new hemorrhage in the right ventricle. A  possible small new areas of subarachnoid hemorrhage. Head CT-There is a large intraparenchymal hemorrhage in the region the left basal  ganglia with mass effect and midline shift to the right by 6 mm. There is  significant extension of this hemorrhage into the ventricular system. There is  dilatation of the right lateral ventricle.  Significant small vessel ischemic  disease seen  CXR-  Adequately positioned support devices.         Neuro Consult-\"Large Left ICH:  Neurosurgery evaluated and did no think there was a surgical role. ICH score suggests 97% mortality. Discussed with her daughter the significant residual disability the patient would be left with even if she survived and could be extubated. 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 The daughter said she spoke with palliative care for 2 hours about all of this as well.     Family is still deciding on plans. Discussed with Dr. Letitia Rosario. Getting CT head to evaluate for worsening hydrocephalus\"  Neurosurgery consult-dominant hemisphere large basil ganglia hemorrhage with interventricular extension  PLAN:This is all assuming the setting of dementia and colon cancer. This carries a very poor prognosis for any quality of life. Neurosurgical intervention would not improve the outcome. Discussions are being had with the power of   The patient is:   [] Verbal and participatory  [x] Non-participatory due to:   intubated    Clinical Pain Assessment (nonverbal scale for nonverbal patients):    Resting comfortably-no evidence of pain at our visit       FUNCTIONAL ASSESSMENT:     Palliative Performance Scale (PPS):30%          PSYCHOSOCIAL/SPIRITUAL SCREENING:     Advance Care Planning:  Advance Care Planning 5/7/2017   Patient's Healthcare Decision Maker is: Named in scanned ACP document   Primary Decision Maker Name -   Primary Decision Maker Phone Number -   Confirm Advance Directive Yes, not on file        Any spiritual / Synagogue concerns:  [] Yes /  [x] No    Caregiver Burnout:  [] Yes /  [x] No /  [] No Caregiver Present      Anticipatory grief assessment:   [x] Normal  / [] Maladaptive       ESAS Anxiety:      ESAS Depression:          REVIEW OF SYSTEMS:     Positive and pertinent negative findings in ROS are noted above in HPI.   The following systems were [] reviewed / [x] unable to be reviewed as noted in HPI  Other findings are noted below. Systems: constitutional, ears/nose/mouth/throat, respiratory, gastrointestinal, genitourinary, musculoskeletal, integumentary, neurologic, psychiatric, endocrine. Positive findings noted below. Modified ESAS Completed by: provider                                            PHYSICAL EXAM:     Wt Readings from Last 3 Encounters:   05/12/17 43.2 kg (95 lb 3.8 oz)   05/03/17 41.2 kg (90 lb 12.8 oz)   03/21/17 41.9 kg (92 lb 6.4 oz)     Blood pressure (!) 135/113, pulse 97, temperature 99.8 °F (37.7 °C), resp. rate 17, height 5' 4\" (1.626 m), weight 43.2 kg (95 lb 3.8 oz), SpO2 100 %. Pain:  Pain Scale 1: Adult Nonverbal Pain Scale  Pain Intensity 1: 0                 Last bowel movement: colostomy 170 ml    Constitutional:intubated, eyes closed, did not follow commands, but was noted to move left leg, alejandro up knee         HISTORY:     Active Problems:    Cerebral hemorrhage (HCC) (5/6/2017)      Dementia (5/9/2017)      Vomiting (5/9/2017)      Altered mental status (5/9/2017)      Past Medical History:   Diagnosis Date    ACP (advance care planning) 11/17/2016    Anemia NEC     Colon cancer ([facility] [address] 4/23/2010    Colon polyps     Descending colon; Sigmoid colon X2    Diverticulosis     Gastric polyps     GERD (gastroesophageal reflux disease)     Glaucoma 4/23/2010    Hypertension 4/23/2010    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   Social History   Substance Use Topics    Smoking status: Former Smoker     Packs/day: 0.25     Years: 10.00 Quit date: 1992    Smokeless tobacco: Never Used    Alcohol use No     Allergies   Allergen Reactions    Penicillins Not Reported This Time      Current Facility-Administered Medications   Medication Dose Route Frequency    dextrose 5% and 0.9% NaCl infusion  37 mL/hr IntraVENous CONTINUOUS    niCARdipine (CARDENE) 25 mg in 0.9% sodium chloride 250 mL infusion  0-15 mg/hr IntraVENous TITRATE    labetalol (NORMODYNE;TRANDATE) 20 mg/4 mL (5 mg/mL) injection 10 mg  10 mg IntraVENous Q4H PRN    amLODIPine (NORVASC) tablet 10 mg  10 mg Oral DAILY    insulin lispro (HUMALOG) injection   SubCUTAneous Q6H    glucose chewable tablet 16 g  4 Tab Oral PRN    glucagon (GLUCAGEN) injection 1 mg  1 mg IntraMUSCular PRN    dextrose (D50W) injection syrg 12.5-25 g  25-50 mL IntraVENous PRN    fentaNYL citrate (PF) injection 50 mcg  50 mcg IntraVENous Q1H PRN    ELECTROLYTE REPLACEMENT PROTOCOL  1 Each Other Q8H    ELECTROLYTE REPLACEMENT PROTOCOL  1 Each Other Q8H    ELECTROLYTE REPLACEMENT PROTOCOL  1 Each Other Q8H    PHARMACY INFORMATION NOTE  1 Each Other DAILY    chlorhexidine (PERIDEX) 0.12 % mouthwash 15 mL  15 mL Oral Q12H    ondansetron (ZOFRAN) injection 1 mg  1 mg IntraVENous Q6H PRN    acetaminophen (TYLENOL) tablet 650 mg  650 mg Oral Q4H PRN    acetaminophen (TYLENOL) suppository 650 mg  650 mg Rectal Q4H PRN    senna-docusate (PERICOLACE) 8.6-50 mg per tablet 2 Tab  2 Tab Oral QHS    bisacodyl (DULCOLAX) tablet 5 mg  5 mg Oral DAILY PRN    bisacodyl (DULCOLAX) suppository 10 mg  10 mg Rectal DAILY PRN    pantoprazole (PROTONIX) 40 mg in sodium chloride 0.9 % 10 mL injection  40 mg IntraVENous Q12H    albuterol (PROVENTIL VENTOLIN) nebulizer solution 2.5 mg  2.5 mg Nebulization K6B PRN    folic acid (FOLVITE) 1 mg, thiamine (B-1) 100 mg in 0.9% sodium chloride 50 mL ivpb   IntraVENous DAILY        LAB AND IMAGING FINDINGS:     Lab Results   Component Value Date/Time    WBC 14.7 05/12/2017 04:00 AM    HGB 10.9 05/12/2017 04:00 AM    PLATELET 483 20/57/7336 04:00 AM     Lab Results   Component Value Date/Time    Sodium 157 05/12/2017 02:45 AM    Potassium 4.8 05/12/2017 02:45 AM    Chloride 125 05/12/2017 02:45 AM    CO2 22 05/12/2017 02:45 AM    BUN 18 05/12/2017 02:45 AM    Creatinine 1.48 05/12/2017 02:45 AM    Calcium 9.3 05/12/2017 02:45 AM    Magnesium 2.2 05/12/2017 02:45 AM    Phosphorus 2.5 05/12/2017 02:45 AM      Lab Results   Component Value Date/Time    AST (SGOT) 35 05/12/2017 02:45 AM    Alk. phosphatase 68 05/12/2017 02:45 AM    Protein, total 6.5 05/12/2017 02:45 AM    Albumin 2.7 05/12/2017 02:45 AM    Globulin 3.8 05/12/2017 02:45 AM    GGT 33 12/09/2015 02:06 PM     Lab Results   Component Value Date/Time    INR 1.3 05/12/2017 02:45 AM    Prothrombin time 15.2 05/12/2017 02:45 AM    aPTT 27.5 05/12/2017 02:45 AM      Lab Results   Component Value Date/Time    Iron 29 06/06/2016 11:47 AM    TIBC 217 06/06/2016 11:47 AM    Iron % saturation 13 06/06/2016 11:47 AM    Ferritin 140 05/03/2017 11:45 AM      No results found for: PH, PCO2, PO2  No components found for: [place]   Lab Results   Component Value Date/Time     05/07/2017 05:50 PM    CK - MB 2.9 05/07/2017 05:50 PM              Total time: 70  Counseling / coordination time, spent as noted above: 40  > 50% counseling / coordination?: yes with dtr   Prolonged service was provided for  [x]30 min   []75 min in face to face time in the presence of the patient, spent as noted above. Time Start: 1:30pmTime End 2:40pm  Note: this can only be billed with 26210 (initial) or 21 [phone] (follow up). If multiple start / stop times, list each separately.

## 2017-05-12 NOTE — PROGRESS NOTES
5136: Received pt on ventilator with following settings: ACVC+ RR 12, , FIO2 30%, PEEP 5. Pt placed on spontaneous breathing trial with pressure support of 7 PEEP of 5. Goal is to exercise respiratory muscles. Vent check performed. ETT suctioned moderate amount of thick white secretions. 1200: Low minute ventilation and low tidal volume alarms triggered. Increased work of breathing observed with accessory muscle use. Pt was having difficulty staying on spontaneous breathing trial. Pt placed back on previous vent settings. Vent check performed by RT Pozo at 1211.     1552: Vent check performed. Sputum sample collected and sent to lab for C+S.

## 2017-05-12 NOTE — ROUTINE PROCESS
1900:  Rec'd Gisel Forbes @ 1900 from Cachorro Bernal RN. SBAR reviewed with two-nurse check-offs done of meds, dressings, wounds, LDA's & revelant assessment findings. Overnight:  Slept poorly, often reaching for ETT or colostomy pouch. Increased ETT secretions, difficult to suction. Needing IV antihypertensives to reach BP goal.  No residuals, TF advanced to goal per RD's note. Remains on queenie-colace despite watery stool in ostomy. Large urine output but Ms. Donita Vela seems very uncomfortable with incontinence and the Pure-Wick as she HR & BP both increase for a period before she voids. Daughter was in early but then left.     0720:  Verbal Patient-side shift change report given to NAMAN Israel RN, (oncoming nurse) by Itz Braxton RN  (offgoing nurse). Report included the following information SBAR, Kardex, ED Summary, Procedure Summary, Intake/Output, MAR, Recent Results and Med Rec Status. Included:  Intro, hx, two-RN eval of relevant assessment findings, LDAs, skin, diagnostics and infusions.

## 2017-05-12 NOTE — PROGRESS NOTES
[facility]  ICU Progress Note      Name: Dustin Jung   : 1930   MRN: [id number]   Date: 2017 11:29 AM     [x]I have reviewed the flowsheet and previous days notes. Events overnight reviewed and discussed with nursing staff. Vital signs and records reviewed. 2017  No overnight events, now febrile 101.2  Currently off cardene  Tolerating TF well-at goal  NA, glucose up  Tolerating SBT, increased secretions this am    [x]The patient is unable to give any meaningful history or review of systems because the patient is:  [x]Intubated []Sedated   []Unresponsive      [x]The patient is critically ill on      [x]Mechanical ventilation []Pressors   []BiPAP []                 ROS:Review of systems not obtained due to patient factors.     Medication Review:  · Pressors -  · Sedation -  · Antibiotics -  · Pain -  · GI/ DVT -protonix  · Others (other gtts)-cardene-off    Safety Bundles: VAP Bundle/ CAUTI/ Severe Sepsis Protocol/ Electrolyte Replacement Protocol    Vital Signs:    Visit Vitals    /62    Pulse 96    Temp 99.8 °F (37.7 °C)    Resp 19    Ht 5' 4\" (1.626 m)    Wt 43.2 kg (95 lb 3.8 oz)    SpO2 100%    BMI 16.35 kg/m2       O2 Device: Endotracheal tube, Ventilator       Temp (24hrs), Av.3 °F (37.4 °C), Min:98.4 °F (36.9 °C), Max:100.5 °F (38.1 °C)       Intake/Output:   Last shift:      701 - 1900  In: 224.6 [I.V.:104.6]  Out: -   Last 3 shifts: 05/10 1901 -  07  In: 2928.6 [I.V.:2108.6]  Out: 1151 [Urine:901]    Intake/Output Summary (Last 24 hours) at 17 1016  Last data filed at 17 0900   Gross per 24 hour   Intake          1950.33 ml   Output             1071 ml   Net           879.33 ml       Ventilator Settings:  Ventilator  Mode: Spontaneous, Pressure support  Respiratory Rate  Resp Rate Observed: 17  Back-Up Rate: 12  Insp Time (sec): 0.9 sec  Insp Flow (l/min): 42 l/min  I:E Ratio: 1:5.39  Ventilator Volumes  Vt Set (ml): 350 ml  Vt Exhaled (Machine Breath) (ml): 360 ml  Vt Spont (ml): 370 ml  Ve Observed (l/min): 6.65 l/min  Ventilator Pressures  Pressure Support (cm H2O): 7 cm H2O  PIP Observed (cm H2O): 13 cm H2O  Plateau Pressure (cm H2O): 14 cm H2O  MAP (cm H2O): 8  PEEP/VENT (cm H2O): 5 cm H20  Auto PEEP Observed (cm H2O): 0 cm H2O    Physical Exam:    General/Neuro: Intubated, off sedation, moves L UE/LE spontaneously, WD RLE with NS, L pupil pinpoint  HEENT:  Anicteric sclerae; ETT in place  Resp:  Symmetrical chest expansion, adequate airway entry; few coarse rhonchi B  CV:  Regular, no m  GI:  Abdomen soft, non-tender; (+) active bowel sounds, +colostomy  Extremities:  +2 pulses on all extremities; SCD in place  Skin:  Warm; no rashes/ lesions noted    DATA:     Current Facility-Administered Medications   Medication Dose Route Frequency    metoprolol tartrate (LOPRESSOR) tablet 12.5 mg  12.5 mg Oral Q12H    niCARdipine (CARDENE) 25 mg in 0.9% sodium chloride 250 mL infusion  0-15 mg/hr IntraVENous TITRATE    labetalol (NORMODYNE;TRANDATE) 20 mg/4 mL (5 mg/mL) injection 10 mg  10 mg IntraVENous Q4H PRN    amLODIPine (NORVASC) tablet 10 mg  10 mg Oral DAILY    insulin lispro (HUMALOG) injection   SubCUTAneous Q6H    glucose chewable tablet 16 g  4 Tab Oral PRN    glucagon (GLUCAGEN) injection 1 mg  1 mg IntraMUSCular PRN    dextrose (D50W) injection syrg 12.5-25 g  25-50 mL IntraVENous PRN    fentaNYL citrate (PF) injection 50 mcg  50 mcg IntraVENous Q1H PRN    ELECTROLYTE REPLACEMENT PROTOCOL  1 Each Other Q8H    ELECTROLYTE REPLACEMENT PROTOCOL  1 Each Other Q8H    ELECTROLYTE REPLACEMENT PROTOCOL  1 Each Other Q8H    PHARMACY INFORMATION NOTE  1 Each Other DAILY    chlorhexidine (PERIDEX) 0.12 % mouthwash 15 mL  15 mL Oral Q12H    ondansetron (ZOFRAN) injection 1 mg  1 mg IntraVENous Q6H PRN    acetaminophen (TYLENOL) tablet 650 mg  650 mg Oral Q4H PRN    acetaminophen (TYLENOL) suppository 650 mg  650 mg Rectal Q4H PRN    senna-docusate (PERICOLACE) 8.6-50 mg per tablet 2 Tab  2 Tab Oral QHS    bisacodyl (DULCOLAX) tablet 5 mg  5 mg Oral DAILY PRN    bisacodyl (DULCOLAX) suppository 10 mg  10 mg Rectal DAILY PRN    pantoprazole (PROTONIX) 40 mg in sodium chloride 0.9 % 10 mL injection  40 mg IntraVENous Q12H    albuterol (PROVENTIL VENTOLIN) nebulizer solution 2.5 mg  2.5 mg Nebulization D9J PRN    folic acid (FOLVITE) 1 mg, thiamine (B-1) 100 mg in 0.9% sodium chloride 50 mL ivpb   IntraVENous DAILY         Labs: Results:       Chemistry Recent Labs      05/12/17   0245 05/11/17   1504  05/11/17   0340   05/10/17   0340   GLU  181*   --   152*   --   142*   NA  157*   --   155*   --   153*   K  4.8  4.2  3.9   < >  4.0   CL  125*   --   124*   --   118*   CO2  22   --   23   --   23   BUN  18   --   17   --   12   CREA  1.48*   --   1.43*   --   1.03   CA  9.3   --   9.4   --   9.7   AGAP  10   --   8   --   12   BUCR  12   --   12   --   12   AP  68   --    --    --    --    TP  6.5   --    --    --    --    ALB  2.7*   --    --    --    --    GLOB  3.8   --    --    --    --    AGRAT  0.7*   --    --    --    --     < > = values in this interval not displayed. CBC w/Diff Recent Labs      05/12/17   0400  05/11/17   0340  05/10/17   0340   WBC  14.7*  14.9*  16.5*   RBC  3.64*  3.47*  3.68*   HGB  10.9*  10.4*  11.2*   HCT  32.7*  31.2*  33.1*   PLT  192  190  180   GRANS  86*  86*  89*   LYMPH  5*  5*  4*   EOS  0  0  0      Coagulation Recent Labs      05/12/17   0245 05/11/17 0340   PTP  15.2  15.7*   INR  1.3*  1.3*   APTT  27.5  33.3       Liver Enzymes Recent Labs      05/12/17   0245   TP  6.5   ALB  2.7*   AP  68   SGOT  35      ABG No results found for: PH, PHI, PCO2, PCO2I, PO2, PO2I, HCO3, HCO3I, FIO2, FIO2I   Microbiology No results for input(s): CULT in the last 72 hours.        Telemetry: [x]Sinus tach []A-flutter []Paced    []A-fib []Multiple PVCs Imaging:  [x]I have personally reviewed the patients radiographs  []Radiographs reviewed with radiologist   []No change from prior, tubes and lines in adequate position  []Improved   []Worsening    5/10/17 CXR   1. Lines and tubes in good position. 2. No acute cardiopulmonary disease     5/9/17 CT head  1. Large intraparenchymal hemorrhage centered in the left basal ganglia,  slightly improved from the prior study, with improved right to left midline  shift. Persistent intraventricular extension with improvement in the left  lateral ventricle and increased/new hemorrhage in the right ventricle. A  possible small new areas of subarachnoid hemorrhage. 5/6/17 CT head    There is a large intraparenchymal hemorrhage in the region the left basal  ganglia with mass effect and midline shift to the right by 6 mm. There is  significant extension of this hemorrhage into the ventricular system. There is  dilatation of the right lateral ventricle. Significant small vessel ischemic  disease seen.       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, some improvement repeat CT 5/9 (see above report)  - Neurosurgery and neurology following  - No plans for surgical intervention-poor prognosis per NS  - BP control  - ICH orderset  · Pulm: VDRF due to above  - SBT [id number] to protect airway due to low GCS, no plans for extubation until [address] established  - Vent orderset  - CXR stable  · CV: HTN  - SBP goal<140  - On norvasc, add metoprolol  - cardene gtt, labetalol prn  · GI  - Continue protonix  - TF at goal,add FW today  · Renal/Metabolic-hypomagnesemia-resolved, hypernatremia  - Add free water for hypernatremia  - Replace lytes per protocol  ·   - cont purewick or straight cath prn for now, singh can be replaced if needed  · ID-fever  - monitor temps, leukocytosis improving, tylenol prn  - febrile today-will panculture, may be central    Endo-FSG with SSI, add levemir today, increase prn        PLAN:   · Prophylaxis - DVT-SCD, GI-protonix  · Discussed in interdisciplinary rounds  · FULL CODE (see ACP on file)  · Palliative care following, continue ongoing family discussions  · Consider ethics consult          The patient is: [] acutely ill Risk of deterioration: [] moderate    [x] critically ill  [x] high     [x]See my orders for details    My assessment/plan was discussed with:  [x]nursing []PT/OT    [x]respiratory therapy [x]   []family []         YENIFER Chacon

## 2017-05-12 NOTE — PROGRESS NOTES
Problem: Ventilator Management  Goal: *Adequate oxygenation and ventilation  Outcome: Progressing Towards Goal  Pt placed on spontaneous breathing trial with pressure support of 7 PEEP of 5 for purpose of exercising respiratory muscles. Discussion today will occur with pt's family regarding whether to trach and PEG or extubate patient. Plan: maintain spontaneous breathing trial for now. Await results of discussion between patient's family and physician. Goal: Maintain adequate ventilation and oxygenation. No ABG or CXR were ordered for today.

## 2017-05-13 PROBLEM — I61.5 INTRAVENTRICULAR HEMORRHAGE, NONTRAUMATIC (HCC): Status: ACTIVE | Noted: 2017-01-01

## 2017-05-13 NOTE — PROGRESS NOTES
1425-Received pt resting in bed with eyes closed, no sign of distress, vs stable on ventilator, family at bedside, will continue to monitor  1600-Incontinent of urine, bed pad and gown changed, queenie care performed, tolerated well

## 2017-05-13 NOTE — PROGRESS NOTES
0800 - Received report from [place], [address] [place] [address]. Pt is intubated and stable. NIH completed. Will continue to monitior. 1100 - IDR completed with Dr. Daquan Larson. NIH changed to every 4 hours  1200 - Pt is stable. 1400 - Bedside and Verbal shift change report given to Hortencia Sky RN (oncoming nurse) by Abebe Hu RN (offgoing nurse). Report included the following information SBAR, Kardex, Intake/Output, MAR, Recent Results and Med Rec Status.

## 2017-05-13 NOTE — PROGRESS NOTES
[facility]  ICU Progress Note      Name: Lisa Garcia   : 1930   MRN: [id number]   Date: 2017 11:29 AM     [x]I have reviewed the flowsheet and previous days notes. Events overnight reviewed and discussed with nursing staff. Vital signs and records reviewed. 2017  Afebrile overnight, cx pending  Currently off cardene  Tolerating TF well-at goal  Na lower, more awake and alert this am  Tolerating SBT    [x]The patient is unable to give any meaningful history or review of systems because the patient is:  [x]Intubated []Sedated   []Unresponsive      [x]The patient is critically ill on      [x]Mechanical ventilation []Pressors   []BiPAP []                 ROS:Review of systems not obtained due to patient factors.     Medication Review:  · Pressors -  · Sedation -  · Antibiotics -  · Pain -fentanyl  · GI/ DVT -protonix  · Others (other gtts)-cardene-off    Safety Bundles: VAP Bundle/ CAUTI/ Severe Sepsis Protocol/ Electrolyte Replacement Protocol    Vital Signs:    Visit Vitals    /60    Pulse 80    Temp 99.7 °F (37.6 °C)    Resp 16    Ht 5' 4\" (1.626 m)    Wt 43.2 kg (95 lb 3.8 oz)    SpO2 100%    BMI 16.35 kg/m2       O2 Device: Endotracheal tube       Temp (24hrs), Av.3 °F (37.4 °C), Min:98.3 °F (36.8 °C), Max:101.2 °F (38.4 °C)       Intake/Output:   Last shift:         Last 3 shifts: 1901 -  07  In: 4277.5 [I.V.:917.5]  Out:  [Urine:1701]    Intake/Output Summary (Last 24 hours) at 17 0956  Last data filed at 17 0800   Gross per 24 hour   Intake           2987.4 ml   Output              970 ml   Net           2017.4 ml       Ventilator Settings:  Ventilator  Mode: Assist control, VC+  Respiratory Rate  Resp Rate Observed: 17  Back-Up Rate: 12  Insp Time (sec): 0.9 sec  Insp Flow (l/min): 42 l/min  I:E Ratio: 1;4  Ventilator Volumes  Vt Set (ml): 350 ml  Vt Exhaled (Machine Breath) (ml): 363 ml  Vt Spont (ml): 306 ml  Ve Observed (l/min): 7.23 l/min  Ventilator Pressures  Pressure Support (cm H2O): 7 cm H2O  PIP Observed (cm H2O): 11 cm H2O  Plateau Pressure (cm H2O): 17 cm H2O  MAP (cm H2O): 7.7  PEEP/VENT (cm H2O): 5 cm H20  Auto PEEP Observed (cm H2O): 0 cm H2O    Physical Exam:    General/Neuro: Intubated, off sedation, moves L UE/LE spontaneously, ? to command LLE, WD RLE/RUE with NS, L pupil pinpoint, will focus on examiner when asked  HEENT:  Anicteric sclerae; ETT in place  Resp:  Symmetrical chest expansion, adequate airway entry; few coarse rhonchi B  CV:  Regular, no m  GI:  Abdomen soft, non-tender; (+) active bowel sounds, +colostomy  Extremities:  +2 pulses on all extremities; SCD in place  Skin:  Warm; no rashes/ lesions noted    DATA:     Current Facility-Administered Medications   Medication Dose Route Frequency    meropenem (MERREM) 1 g in 0.9% sodium chloride (MBP/ADV) 50 mL MBP  1 g IntraVENous Q8H    potassium chloride SR (KLOR-CON 10) tablet 10 mEq  10 mEq Oral ONCE    metoprolol tartrate (LOPRESSOR) tablet 12.5 mg  12.5 mg Oral Q12H    insulin detemir (LEVEMIR) injection 5 Units  5 Units SubCUTAneous Q24H    fentaNYL citrate (PF) injection 50 mcg  50 mcg IntraVENous Q1H PRN    niCARdipine (CARDENE) 25 mg in 0.9% sodium chloride 250 mL infusion  0-15 mg/hr IntraVENous TITRATE    labetalol (NORMODYNE;TRANDATE) 20 mg/4 mL (5 mg/mL) injection 10 mg  10 mg IntraVENous Q4H PRN    amLODIPine (NORVASC) tablet 10 mg  10 mg Oral DAILY    insulin lispro (HUMALOG) injection   SubCUTAneous Q6H    glucose chewable tablet 16 g  4 Tab Oral PRN    glucagon (GLUCAGEN) injection 1 mg  1 mg IntraMUSCular PRN    dextrose (D50W) injection syrg 12.5-25 g  25-50 mL IntraVENous PRN    ELECTROLYTE REPLACEMENT PROTOCOL  1 Each Other Q8H    ELECTROLYTE REPLACEMENT PROTOCOL  1 Each Other Q8H    ELECTROLYTE REPLACEMENT PROTOCOL  1 Each Other Q8H    PHARMACY INFORMATION NOTE  1 Each Other DAILY    chlorhexidine (PERIDEX) 0.12 % mouthwash 15 mL  15 mL Oral Q12H    ondansetron (ZOFRAN) injection 1 mg  1 mg IntraVENous Q6H PRN    acetaminophen (TYLENOL) tablet 650 mg  650 mg Oral Q4H PRN    acetaminophen (TYLENOL) suppository 650 mg  650 mg Rectal Q4H PRN    senna-docusate (PERICOLACE) 8.6-50 mg per tablet 2 Tab  2 Tab Oral QHS    bisacodyl (DULCOLAX) tablet 5 mg  5 mg Oral DAILY PRN    bisacodyl (DULCOLAX) suppository 10 mg  10 mg Rectal DAILY PRN    pantoprazole (PROTONIX) 40 mg in sodium chloride 0.9 % 10 mL injection  40 mg IntraVENous Q12H    albuterol (PROVENTIL VENTOLIN) nebulizer solution 2.5 mg  2.5 mg Nebulization S2H PRN    folic acid (FOLVITE) 1 mg, thiamine (B-1) 100 mg in 0.9% sodium chloride 50 mL ivpb   IntraVENous DAILY         Labs: Results:       Chemistry Recent Labs      05/13/17   0345 05/12/17   0245  05/11/17   1504  05/11/17 0340   GLU  83  181*   --   152*   NA  156*  157*   --   155*   K  3.7  4.8  4.2  3.9   CL  122*  125*   --   124*   CO2  24  22   --   23   BUN  19*  18   --   17   CREA  1.40*  1.48*   --   1.43*   CA  9.4  9.3   --   9.4   AGAP  10  10   --   8   BUCR  14  12   --   12   AP   --   68   --    --    TP   --   6.5   --    --    ALB   --   2.7*   --    --    GLOB   --   3.8   --    --    AGRAT   --   0.7*   --    --       CBC w/Diff Recent Labs      05/13/17   0345  05/12/17   0400  05/11/17   0340   WBC  15.0*  14.7*  14.9*   RBC  3.45*  3.64*  3.47*   HGB  10.4*  10.9*  10.4*   HCT  31.2*  32.7*  31.2*   PLT  201  192  190   GRANS  82*  86*  86*   LYMPH  6*  5*  5*   EOS  0  0  0      Coagulation Recent Labs      05/13/17 0345  05/12/17 0245   PTP  15.1  15.2   INR  1.2  1.3*   APTT  31.3  27.5       Liver Enzymes Recent Labs      05/12/17 0245   TP  6.5   ALB  2.7*   AP  68   SGOT  35      ABG Lab Results   Component Value Date/Time    PHI 7.475 (H) 05/13/2017 04:52 AM    PCO2I 34.6 (LL) 05/13/2017 04:52 AM    PO2I 147 (H) 05/13/2017 04:52 AM    HCO3I 25.5 05/13/2017 04:52 AM    FIO2I 0.30 05/13/2017 04:52 AM      Microbiology Recent Labs      05/12/17   1536  05/12/17   1243   CULT  PENDING  NO GROWTH AFTER 18 HOURS          Telemetry: [x]Sinus  []A-flutter []Paced    []A-fib []Multiple PVCs                    Imaging:  [x]I have personally reviewed the patients radiographs  []Radiographs reviewed with radiologist   []No change from prior, tubes and lines in adequate position  []Improved   []Worsening    5/13/17 CXR   pending    5/9/17 CT head  1. Large intraparenchymal hemorrhage centered in the left basal ganglia,  slightly improved from the prior study, with improved right to left midline  shift. Persistent intraventricular extension with improvement in the left  lateral ventricle and increased/new hemorrhage in the right ventricle. A  possible small new areas of subarachnoid hemorrhage. 5/6/17 CT head    There is a large intraparenchymal hemorrhage in the region the left basal  ganglia with mass effect and midline shift to the right by 6 mm. There is  significant extension of this hemorrhage into the ventricular system. There is  dilatation of the right lateral ventricle. Significant small vessel ischemic  disease seen.       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, some improvement repeat CT 5/9 (see above report)  - Neurosurgery and neurology following  - No plans for surgical intervention-poor prognosis per NS  - BP control  - ICH orderset  · Pulm: VDRF due to above VENT DAY 8  - SBT [id number] able to protect airway due to low GCS, no plans for extubation until [address] established  - Vent orderset  - CXR pending today  - Sputum pending, on merrem  · CV: HTN  - SBP goal<140  - On norvasc, metoprolol  - cardene gtt, labetalol prn  · GI  - Continue protonix  - TF at goal,inc FW today  · Renal/Metabolic-hypomagnesemia-resolved, hypernatremia  - free water for hypernatremia  - Replace lytes per protocol  ·   - cont purewick or straight cath prn for now, singh can be replaced if needed  · ID-fever-improved  - monitor temps, leukocytosis improving, tylenol prn  - UA+, on merrem, blood cx NGTD, sputum cx pending, GS +    Endo-FSG with SSI, increase levemir today        PLAN:   · Prophylaxis - DVT-SCD, GI-protonix  · Discussed in interdisciplinary rounds  · FULL CODE (see ACP on file)  · Palliative care following, continue ongoing family discussions-the family has had multiple lengthy discussions and have not yet been able to decide to proceed with trach/PEG or WOC. Additional family (another daughter) arriving today to assist daughter with decision.   · Consider ethics consult          The patient is: [] acutely ill Risk of deterioration: [] moderate    [x] critically ill  [x] high     [x]See my orders for details    My assessment/plan was discussed with:  [x]nursing []PT/OT    [x]respiratory therapy [x]   []family []         YENIFER Chase

## 2017-05-13 NOTE — PROGRESS NOTES
Problem: Ventilator Management  Goal: *Adequate oxygenation and ventilation  Rec'd pt on vent:  AC VC+, Tu=252, Rate=12, Fio2=30%, peep=5  Pt resting on vent - no distress observed  Vent check completed     05/13 ABG Result:  PH=7.475, CO2=34.6, DF1=026, HCO3=25.5, 99%    0755-Placed on SBT- PSV=7, Fio2=30%, peep=5 -- confirmed no sedation active per RN  --5 min results: RSBI=34, Qs=319, Ve=8.42, RR=17, HR=94m, VR=259/85  --will continue to monitor on SBT for stability    1740-Called to bedside - requesting to place pt back to full support  Stop SBT: Ending data:  RSBI=46, RR=17, HR=94, So=000, Ve=6.31    Resp Plan:  Monitor pt on vent, suction as needed. Place on SBT as tolerated. Possible trach/peg still concerned with airway protection & fatigue. Resp Goal:  Maintain respiratory stability in support of physician plan of care. .  Wean from vent, & successful SBT,

## 2017-05-13 NOTE — PROGRESS NOTES
1945: Received report from Gary Kelly at this time. Dual neuro assessment complete at this time. Patient moves left side spontaneously. Does open eyes to verbal and tactile stimuli. Eyes fixed, right eye irregular and oval. No command following. Vent settings noted. Mouth care and pericare complete. +cough/gag.  2100: labetalol 10mg given at this time. SBP >140s. 2230: patient grimacing and restless, RR increased from base line. , Fentanyl 50mcg given at this time. 0230: Fentanyl 50mcg given at this time for pain. 0330: bath complete patient tolerated well.   0530: Reviewing lab results sputum culture + , Dr Kena Thakur paged

## 2017-05-13 NOTE — PROGRESS NOTES
Progress Note      Patient: Bernardo Thurman               Sex: female          DOA: 5/6/2017       YOB: 1930      Age:  80 y.o.        LOS:  LOS: 7 days             CHIEF COMPLAINT:    Subjective:     Pt remains intubated    Objective:      Visit Vitals    /60    Pulse 88    Temp 99.8 °F (37.7 °C)    Resp 17    Ht 5' 4\" (1.626 m)    Wt 95 lb 3.8 oz (43.2 kg)    SpO2 100%    BMI 16.35 kg/m2       Physical Exam:  GEN: Intubated  CVS: Normal S1S2, RRR  RESP: Moderate air entry bilaterally  ABD: Soft, non distended, +BS  EXT: No edema noted        Lab/Data Reviewed:  CMP:   Lab Results   Component Value Date/Time     (H) 05/13/2017 03:45 AM    K 4.4 05/13/2017 03:00 PM     (H) 05/13/2017 03:45 AM    CO2 24 05/13/2017 03:45 AM    AGAP 10 05/13/2017 03:45 AM    GLU 83 05/13/2017 03:45 AM    BUN 19 (H) 05/13/2017 03:45 AM    CREA 1.40 (H) 05/13/2017 03:45 AM    GFRAA 43 (L) 05/13/2017 03:45 AM    GFRNA 36 (L) 05/13/2017 03:45 AM    CA 9.4 05/13/2017 03:45 AM    MG 2.1 05/13/2017 03:45 AM    PHOS 3.0 05/13/2017 03:45 AM     CBC:   Lab Results   Component Value Date/Time    WBC 15.0 (H) 05/13/2017 03:45 AM    HGB 10.4 (L) 05/13/2017 03:45 AM    HCT 31.2 (L) 05/13/2017 03:45 AM     05/13/2017 03:45 AM           Assessment/Plan     Active Problems:    Cerebral hemorrhage ([facility] [address] (5/6/2017)      Dementia (5/9/2017)      Vomiting (5/9/2017)      Altered mental status (5/9/2017)      Intraventricular hemorrhage, nontraumatic ([facility] [address] (5/13/2017)        Plan:    Left basal ganglia ICH with a 6 mm midline shift - Neurosurgery and neurology following and appreciate help. PCCM managing vent  BP controlled. Continue current meds and close BP monitoring  Metabolic encephalopathy related to cerebral hemorrhage  VDRF 2/2 ICH  HTN  Hypothyroidism   Hypernatremia   Underweight  Cont supportive care   Palliative care following and pt remains full code.         Uli Manning DO, MPH  Internal Medicine

## 2017-05-13 NOTE — PROGRESS NOTES
PCCM #2    Lengthy discussion of >60 minutes with pt's daughter Juan Ramon Santiago and pt's granddaughter. Again explained that pt has poor overall prognosis and will not be independent. Nataliia's sister Henry Cervantes arrived from out of town today, however, she has not been able to have any meaningful conversations Henry Cervantes regarding goals of care for their mother. Offered for family meeting tomorrow to decide on full care with trach/PEG or WOC. They are agreeable to meeting, however they are currently unsure of time frame and will let me know tomorrow.      Juventino Khan PA-C

## 2017-05-13 NOTE — PROGRESS NOTES
Reason for Renal Dosing:  Per Renal Dosing Policy    Patient clinical status and labs ordered/reviewed. Pt Weight Weight: 43.2 kg (95 lb 3.8 oz)   Serum Creatinine Lab Results   Component Value Date/Time    Creatinine 1.40 05/13/2017 03:45 AM    Creatinine (POC) 1.0 12/19/2013 09:59 AM       Creatinine Clearance Estimated Creatinine Clearance: 19.7 mL/min (based on Cr of 1.4). BUN Lab Results   Component Value Date/Time    BUN 19 05/13/2017 03:45 AM    BUN (POC) 10 04/07/2010 05:19 PM       WBC Lab Results   Component Value Date/Time    WBC 15.0 05/13/2017 03:45 AM      Temperature Temp: 99.3 °F (37.4 °C)   HR Pulse (Heart Rate): 81     BP BP: 128/61           Drug type: Antibiotic indicated for Sepsis of Unknown Etiology/ Upper Respiratory Infection        Drug/dose: Merrem 1 g IV Q 8H was adjusted to : Merrem 1 g IV Q 12H. Will further adjust with culture results      Continue to monitor.     Signed Mariana Mora, PHARMD  Date 5/13/2017  Time 2:05 PM

## 2017-05-13 NOTE — ROUTINE PROCESS
Bedside and Verbal shift change report given to [address] [place] [address] (oncoming nurse) by Analia Carrington RN (offgoing nurse). Report included the following information SBAR, Kardex, Intake/Output, MAR, Recent Results, Med Rec Status and Cardiac Rhythm SR/ST.

## 2017-05-14 NOTE — PROGRESS NOTES
Reason for Renal Dosing:  Per Renal Dosing Policy    Patient clinical status and labs ordered/reviewed. Pt Weight Weight: 40.4 kg (89 lb 1.1 oz)   Serum Creatinine Lab Results   Component Value Date/Time    Creatinine 1.28 05/14/2017 04:10 AM    Creatinine (POC) 1.0 12/19/2013 09:59 AM       Creatinine Clearance Estimated Creatinine Clearance: 20.1 mL/min (based on Cr of 1.28). BUN Lab Results   Component Value Date/Time    BUN 18 05/14/2017 04:10 AM    BUN (POC) 10 04/07/2010 05:19 PM       WBC Lab Results   Component Value Date/Time    WBC 17.1 05/14/2017 04:10 AM      Temperature Temp: 99.7 °F (37.6 °C)   HR Pulse (Heart Rate): 94     BP BP: 143/64           Drug type: Antibiotic indicated for Upper Respiratory Infection       Drug/dose: Merrem 1 g IV Q 12H was adjusted to : Merrem 0.5 g IV Q 12H     Continue to monitor.     Signed BLAKE RitchieD  Date 5/14/2017  Time 2:13 PM

## 2017-05-14 NOTE — PROGRESS NOTES
Problem: Ventilator Management  Goal: *Adequate oxygenation and ventilation  Rec'd pt on vent:  AC VC+, Hu=900, rate=12, Fio2=30%, peep=5  Pt resting on vent - confirmed no sedation  [place] tube cleared 1cc, adj ETT showing 22 lip     0906-Begin SBT:  PSV=7  Initial results: RR=18, Ve=5.6, Hq=342, RSBI=44, HR=83, Sat=100%, KX=496/56    1215-Vent check completed - remains on PSV  Advance ETT 2  Cm per order - replaced angela, bite block, & advance ETT  ETT advanced new position:  24 upper lift - or 25 outer lip - positional  Suction via catheter & oral:  Large amount, thick & white secretions    1530-Pt transported from 2601 to 6815 - no complications    5532-DMQD arrival from transport pt began going apneic  -returned to full support      Pt & her family consulting with palliative care throughout day & following family meetings pt is being extubated to comfort care    [phone]- Extubate for comfort care - placed on 4 lpm NC      Resp Plan: Monitor pt on vent, suction as needed. Place on SBT as tolerated. Possible trach/peg still concerned with airway protection & fatigue.     Resp Goal: Maintain respiratory stability in support of physician plan of care. . Wean from vent, & successful SBT,

## 2017-05-14 NOTE — PROGRESS NOTES
1950--Received patient at this time. Assessment completed. Pure wick changed and to low continuous suction. Pad changed. Repositioned. No distress noted. 0720 Bedside shift change report given to Jamshid Wilkins RN   (oncoming nurse) by Kandice Barry Rn   (offgoing nurse). Report included the following information SBAR, Procedure Summary, Intake/Output, MAR, Recent Results and Cardiac Rhythm nsr.

## 2017-05-14 NOTE — ROUTINE PROCESS
1500 patient transported to unit via bed, RT, nurse at bedside. [phone] patient extubated. 1805 brought family to see patient. Education on comfort measures provided, opportunity for questions provided. [address] called 2W. Spoke with unit secretary who states charge nurse will assign patient.

## 2017-05-14 NOTE — PROGRESS NOTES
Progress Note    Patient: Ryan Perla MRN: [id number]  SSN: [id number]    YOB: 1930  Age: 80 y.o. Sex: female      Admit Date: 5/6/2017    LOS: 8 days     Subjective:       79 y/o female with dementia, admitted on 5/6/`7 with nausea, vomiting, and obtundation. CT of the brain showed a large left basal ganglia hemorrhage with intra ventricular extension and left to right shift. The patient also has colon cancer. Dr. Henrik Youngblood, neurosurgery, was consulted and did not recommend surgical intervention. Comfort care was recommended. Unfortunately, the patient's family kept her a full code. Palliative Care service was consulted. Dr. Suhas Coy saw the patient and made it clear to the family that the patient, if she survives, which have right hemiparesis, and possible language impairment. Follow up CT of the head done on 5/10 showed modest improvement in the size of the hemorrhage, but more blood in the ventricles. He documented that he has made the patient's poor prognosis clear to the patient's family several times. Per chart review, primary team is awaiting family's decision regarding continued care. Pt. Remains intubated, moving the left side, and possible withdrawing right side to pain, but not obeying commands. The palliative care service is meeting with family members at present. Objective:     Vitals:    05/14/17 0900 05/14/17 0901 05/14/17 1000 05/14/17 1229   BP: 131/56  143/64    Pulse: 87 76 85 94   Resp: 21 15 20 19   Temp:       SpO2: 100% 100% 100% 100%   Weight:       Height:            Physical Exam:   Opens eyes to strong stim, but no focusing or tracking. No following commands. Moved left side a bit to stim, not right.     Lab/Data Review:  Recent Results (from the past 50 hour(s))   CULTURE, RESPIRATORY/SPUTUM/BRONCH W GRAM STAIN    Collection Time: 05/12/17  3:36 PM   Result Value Ref Range    Special Requests: NO SPECIAL REQUESTS      GRAM STAIN >25 WBC/lpf GRAM STAIN <10 EPI/lpf      GRAM STAIN MANY  GRAM POSITIVE COCCI  IN PAIRS        GRAM STAIN FEW  GRAM POSITIVE COCCI  IN CHAINS        GRAM STAIN FEW  GRAM POSITIVE COCCI  IN GROUPS        GRAM STAIN FEW  GRAM POSITIVE RODS        GRAM STAIN MODERATE  GRAM NEGATIVE RODS        GRAM STAIN MUCUS PRESENT      Culture result: MODERATE  NORMAL RESPIRATORY TOM       LACTIC ACID, PLASMA    Collection Time: 05/12/17  4:30 PM   Result Value Ref Range    Lactic acid 1.9 0.4 - 2.0 MMOL/L   GLUCOSE, POC    Collection Time: 05/12/17  5:07 PM   Result Value Ref Range    Glucose (POC) 225 (H) 70 - 110 mg/dL   TYPE & SCREEN    Collection Time: 05/12/17  8:45 PM   Result Value Ref Range    Crossmatch Expiration 05/15/2017     ABO/Rh(D) B POSITIVE     Antibody screen NEG     Unit number [id number]     Blood component type  LR AS1     Unit division 00     Status of unit ALLOCATED     ANTIGEN/ANTIBODY INFO Jk(A) NEGATIVE,     Crossmatch result Compatible     Unit number [id number]     Blood component type  LR AS1     Unit division 00     Status of unit ALLOCATED     ANTIGEN/ANTIBODY INFO Jk(A) NEGATIVE,     Crossmatch result Compatible    LACTIC ACID, PLASMA    Collection Time: 05/12/17  8:45 PM   Result Value Ref Range    Lactic acid 1.9 0.4 - 2.0 MMOL/L   LACTIC ACID, PLASMA    Collection Time: 05/13/17 12:50 AM   Result Value Ref Range    Lactic acid 1.5 0.4 - 2.0 MMOL/L   GLUCOSE, POC    Collection Time: 05/13/17  1:02 AM   Result Value Ref Range    Glucose (POC) 269 (H) 70 - 110 mg/dL   CALCIUM, IONIZED    Collection Time: 05/13/17  3:45 AM   Result Value Ref Range    Ionized Calcium 1.34 (H) 1.12 - 1.32 MMOL/L   MAGNESIUM    Collection Time: 05/13/17  3:45 AM   Result Value Ref Range    Magnesium 2.1 1.6 - 2.6 mg/dL   PHOSPHORUS    Collection Time: 05/13/17  3:45 AM   Result Value Ref Range    Phosphorus 3.0 2.5 - 4.9 MG/DL   PROTHROMBIN TIME + INR    Collection Time: 05/13/17  3:45 AM   Result Value Ref Range Prothrombin time 15.1 11.5 - 15.2 sec    INR 1.2 0.8 - 1.2     PTT    Collection Time: 05/13/17  3:45 AM   Result Value Ref Range    aPTT 31.3 23.0 - 36.4 SEC   CBC WITH AUTOMATED DIFF    Collection Time: 05/13/17  3:45 AM   Result Value Ref Range    WBC 15.0 (H) 4.6 - 13.2 K/uL    RBC 3.45 (L) 4.20 - 5.30 M/uL    HGB 10.4 (L) 12.0 - 16.0 g/dL    HCT 31.2 (L) 35.0 - 45.0 %    MCV 90.4 74.0 - 97.0 FL    MCH 30.1 24.0 - 34.0 PG    MCHC 33.3 31.0 - 37.0 g/dL    RDW 15.3 (H) 11.6 - 14.5 %    PLATELET 178 764 - 134 K/uL    MPV 11.3 9.2 - 11.8 FL    NEUTROPHILS 82 (H) 40 - 73 %    LYMPHOCYTES 6 (L) 21 - 52 %    MONOCYTES 12 (H) 3 - 10 %    EOSINOPHILS 0 0 - 5 %    BASOPHILS 0 0 - 2 %    ABS. NEUTROPHILS 12.4 (H) 1.8 - 8.0 K/UL    ABS. LYMPHOCYTES 0.9 0.9 - 3.6 K/UL    ABS. MONOCYTES 1.8 (H) 0.05 - 1.2 K/UL    ABS. EOSINOPHILS 0.0 0.0 - 0.4 K/UL    ABS.  BASOPHILS 0.0 0.0 - 0.06 K/UL    DF AUTOMATED     METABOLIC PANEL, BASIC    Collection Time: 05/13/17  3:45 AM   Result Value Ref Range    Sodium 156 (H) 136 - 145 mmol/L    Potassium 3.7 3.5 - 5.5 mmol/L    Chloride 122 (H) 100 - 108 mmol/L    CO2 24 21 - 32 mmol/L    Anion gap 10 3.0 - 18 mmol/L    Glucose 83 74 - 99 mg/dL    BUN 19 (H) 7.0 - 18 MG/DL    Creatinine 1.40 (H) 0.6 - 1.3 MG/DL    BUN/Creatinine ratio 14 12 - 20      GFR est AA 43 (L) >60 ml/min/1.73m2    GFR est non-AA 36 (L) >60 ml/min/1.73m2    Calcium 9.4 8.5 - 10.1 MG/DL   LACTIC ACID, PLASMA    Collection Time: 05/13/17  3:45 AM   Result Value Ref Range    Lactic acid 2.0 0.4 - 2.0 MMOL/L   POC G3    Collection Time: 05/13/17  4:52 AM   Result Value Ref Range    Device: VENT      FIO2 (POC) 0.30 %    pH (POC) 7.475 (H) 7.35 - 7.45      pCO2 (POC) 34.6 (LL) 35 - 45 MMHG    pO2 (POC) 147 (H) 80 - 100 MMHG    HCO3 (POC) 25.5 22 - 26 MMOL/L    sO2 (POC) 99 (H) 92 - 97 %    Base excess (POC) 2 mmol/L    Mode ASSIST CONTROL      Tidal volume 350 ml    Set Rate 12 bpm    PEEP/CPAP (POC) 5 cmH2O    PIP (POC) 12 Allens test (POC) YES      Inspiratory Time 0.90 sec    Total resp. rate 18      Site RIGHT RADIAL      Patient temp.  98.7      Specimen type (POC) ARTERIAL      Performed by Thony Chang    GLUCOSE, POC    Collection Time: 05/13/17  7:57 AM   Result Value Ref Range    Glucose (POC) 237 (H) 70 - 110 mg/dL   LACTIC ACID, PLASMA    Collection Time: 05/13/17  9:53 AM   Result Value Ref Range    Lactic acid 1.6 0.4 - 2.0 MMOL/L   GLUCOSE, POC    Collection Time: 05/13/17 10:47 AM   Result Value Ref Range    Glucose (POC) 228 (H) 70 - 110 mg/dL   POTASSIUM    Collection Time: 05/13/17  3:00 PM   Result Value Ref Range    Potassium 4.4 3.5 - 5.5 mmol/L   GLUCOSE, POC    Collection Time: 05/13/17  4:45 PM   Result Value Ref Range    Glucose (POC) 169 (H) 70 - 110 mg/dL   GLUCOSE, POC    Collection Time: 05/14/17 12:21 AM   Result Value Ref Range    Glucose (POC) 255 (H) 70 - 110 mg/dL   CALCIUM, IONIZED    Collection Time: 05/14/17  4:10 AM   Result Value Ref Range    Ionized Calcium 1.23 1.12 - 1.32 MMOL/L   MAGNESIUM    Collection Time: 05/14/17  4:10 AM   Result Value Ref Range    Magnesium 2.1 1.6 - 2.6 mg/dL   PHOSPHORUS    Collection Time: 05/14/17  4:10 AM   Result Value Ref Range    Phosphorus 2.9 2.5 - 4.9 MG/DL   PROTHROMBIN TIME + INR    Collection Time: 05/14/17  4:10 AM   Result Value Ref Range    Prothrombin time 14.7 11.5 - 15.2 sec    INR 1.2 0.8 - 1.2     PTT    Collection Time: 05/14/17  4:10 AM   Result Value Ref Range    aPTT 34.2 23.0 - 17.5 SEC   METABOLIC PANEL, BASIC    Collection Time: 05/14/17  4:10 AM   Result Value Ref Range    Sodium 151 (H) 136 - 145 mmol/L    Potassium 3.7 3.5 - 5.5 mmol/L    Chloride 117 (H) 100 - 108 mmol/L    CO2 25 21 - 32 mmol/L    Anion gap 9 3.0 - 18 mmol/L    Glucose 147 (H) 74 - 99 mg/dL    BUN 18 7.0 - 18 MG/DL    Creatinine 1.28 0.6 - 1.3 MG/DL    BUN/Creatinine ratio 14 12 - 20      GFR est AA 48 (L) >60 ml/min/1.73m2    GFR est non-AA 40 (L) >60 ml/min/1.73m2    Calcium 9.5 8.5 - 10.1 MG/DL   CBC WITH AUTOMATED DIFF    Collection Time: 05/14/17  4:10 AM   Result Value Ref Range    WBC 17.1 (H) 4.6 - 13.2 K/uL    RBC 3.55 (L) 4.20 - 5.30 M/uL    HGB 10.5 (L) 12.0 - 16.0 g/dL    HCT 31.5 (L) 35.0 - 45.0 %    MCV 88.7 74.0 - 97.0 FL    MCH 29.6 24.0 - 34.0 PG    MCHC 33.3 31.0 - 37.0 g/dL    RDW 14.6 (H) 11.6 - 14.5 %    PLATELET 674 893 - 698 K/uL    MPV 11.1 9.2 - 11.8 FL    NEUTROPHILS 84 (H) 40 - 73 %    LYMPHOCYTES 8 (L) 21 - 52 %    MONOCYTES 8 3 - 10 %    EOSINOPHILS 0 0 - 5 %    BASOPHILS 0 0 - 2 %    ABS. NEUTROPHILS 14.5 (H) 1.8 - 8.0 K/UL    ABS. LYMPHOCYTES 1.3 0.9 - 3.6 K/UL    ABS. MONOCYTES 1.3 (H) 0.05 - 1.2 K/UL    ABS. EOSINOPHILS 0.0 0.0 - 0.4 K/UL    ABS. BASOPHILS 0.0 0.0 - 0.06 K/UL    DF AUTOMATED     POC VENOUS BLOOD GAS    Collection Time: 05/14/17  4:50 AM   Result Value Ref Range    Device: VENT      FIO2 (POC) 30 %    pH, venous (POC) 7.453 (H) 7.32 - 7.42      pCO2, venous (POC) 36.7 (L) 41 - 51 MMHG    pO2, venous (POC) 38 25 - 40 mmHg    HCO3, venous (POC) 25.7 23.0 - 28.0 MMOL/L    sO2, venous (POC) 75 65 - 88 %    Base excess, venous (POC) 2 mmol/L    Mode ASSIST CONTROL      Tidal volume 350 ml    Set Rate 12 bpm    PEEP/CPAP (POC) 5 cmH2O    PIP (POC) 13      Allens test (POC) YES      Inspiratory Time 0.9 sec    Total resp. rate 18      Site RIGHT RADIAL      Specimen type (POC) VENOUS BLOOD      Performed by Devon Eddy     Volume control plus YES     POC G3    Collection Time: 05/14/17  4:58 AM   Result Value Ref Range    Device: VENT      FIO2 (POC) 30 %    pH (POC) 7.502 (H) 7.35 - 7.45      pCO2 (POC) 31.3 (LL) 35 - 45 MMHG    pO2 (POC) 83 80 - 100 MMHG    HCO3 (POC) 24.5 22 - 26 MMOL/L    sO2 (POC) 97 92 - 97 %    Base excess (POC) 1 mmol/L    Mode ASSIST CONTROL      Tidal volume 350 ml    Set Rate 12 bpm    PEEP/CPAP (POC) 5 cmH2O    PIP (POC) 17      Allens test (POC) YES      Inspiratory Time 0.9 sec    Total resp. rate 18      Site RIGHT RADIAL      Specimen type (POC) ARTERIAL      Performed by Zion Kowalski     Volume control plus YES     GLUCOSE, POC    Collection Time: 05/14/17  5:56 AM   Result Value Ref Range    Glucose (POC) 193 (H) 70 - 110 mg/dL   GLUCOSE, POC    Collection Time: 05/14/17 12:38 PM   Result Value Ref Range    Glucose (POC) 259 (H) 70 - 110 mg/dL         Assessment:     Active Problems:    Cerebral hemorrhage ([address] (5/6/2017)      Dementia (5/9/2017)      Vomiting (5/9/2017)      Altered mental status (5/9/2017)      Intraventricular hemorrhage, nontraumatic ([address] (5/13/2017)        Plan:     I hope that the patient's family will elect comfort care only soon, which is appropriate for this patient. No further recs. Will sign off. Pls call if questions.     Signed By: Zoe Runner, MD     May 14, 2017

## 2017-05-14 NOTE — PROGRESS NOTES
[facility] #2    Family meeting arranged with pt's daughter/MDM Funmilayo De Leon, her sister Barb Leader, and Nataliia's 2 children. Lata's  Mj Mario on teleconference. Palliative care, Dr. Denis Eisenberg came in to speak with family to discuss current condition, prognosis, treatment plans and goals of care. Therapeutic options, response discussed. Answered all questions and concerns  Discussed code status, comfort measure options. Following recommendations:   Patient's family elects to compassionately extubate patient today. All family members in agreement to current plan of care. Pt's daughter Nataliia/LORRAINE agrees to change code status to DNR/DNI/comfort care. Additional family members in agreement as well to proceed after detailed discussions of plans following extubation and comfort medications  D/c all labs, imaging  Morphine PCA to be started prior to extubation today  Comfort care orders placed by Dr. Richard Duenas    Family updated on care plans and are all at bedside. DNR/DNI/Comfort care   Appreciate Dr. Dean Cesar assistance.   >60 minutes spent with patients family    Mati Montgomery

## 2017-05-14 NOTE — PROGRESS NOTES
Patient in bed on back with head elevated - BBS equal and ess clear - patient suctioned and RAMON tube cleared - ETT secure at 22 at the lips and moved to the right side of the mouth - [facility] at [facility] - vent alarms on and functional    0027 - patient suctioned and RAMON tube cleared - ETT moved to the center of the mouth    0459 - patient suctioned by RN - RAMON tube cleared - ETT moved to the right side of the mouth - water bag changed - ABG obtained - no changes in vent settings at this time

## 2017-05-14 NOTE — PROGRESS NOTES
Progress Note      Patient: Geri Bernard               Sex: female          DOA: 5/6/2017       YOB: 1930      Age:  80 y.o.        LOS:  LOS: 8 days             CHIEF COMPLAINT:    Subjective:     Pt remains intubated. Dr. Corina Huber met with family members earlier per nurse and they have agreed for terminal extubation and then comfort care measures thereafter. Pt is about to be extubated at this time with nurse and respiratory therapist at bedside. Objective:      Visit Vitals    /64    Pulse 97    Temp 99.7 °F (37.6 °C)    Resp 19    Ht 5' 4\" (1.626 m)    Wt 89 lb 1.1 oz (40.4 kg)    SpO2 100%    BMI 15.29 kg/m2       Physical Exam:  GEN: Intubated  CVS: Normal S1S2, RRR  RESP: Moderate air entry bilaterally  ABD: Soft, non distended, +BS  EXT: No edema noted        Lab/Data Reviewed:  CMP:   Lab Results   Component Value Date/Time     (H) 05/14/2017 04:10 AM    K 3.7 05/14/2017 04:10 AM     (H) 05/14/2017 04:10 AM    CO2 25 05/14/2017 04:10 AM    AGAP 9 05/14/2017 04:10 AM     (H) 05/14/2017 04:10 AM    BUN 18 05/14/2017 04:10 AM    CREA 1.28 05/14/2017 04:10 AM    GFRAA 48 (L) 05/14/2017 04:10 AM    GFRNA 40 (L) 05/14/2017 04:10 AM    CA 9.5 05/14/2017 04:10 AM    MG 2.1 05/14/2017 04:10 AM    PHOS 2.9 05/14/2017 04:10 AM     CBC:   Lab Results   Component Value Date/Time    WBC 17.1 (H) 05/14/2017 04:10 AM    HGB 10.5 (L) 05/14/2017 04:10 AM    HCT 31.5 (L) 05/14/2017 04:10 AM     05/14/2017 04:10 AM           Assessment/Plan     Active Problems:    Cerebral hemorrhage ([address] (5/6/2017)      Dementia (5/9/2017)      Vomiting (5/9/2017)      Altered mental status (5/9/2017)      Intraventricular hemorrhage, nontraumatic ([facility] (5/13/2017)        Plan:  Left basal ganglia ICH with a 6 mm midline shift - Neurosurgery and neurology following and appreciate help. PCCM managing vent  BP controlled.  Continue current meds and close BP monitoring  Metabolic encephalopathy related to cerebral hemorrhage  VDRF 2/2 ICH  HTN  Hypothyroidism   Hypernatremia   Underweight  Continue supportive care   Palliative care following and greatly appreciate Dr. Ana Gtz help    Terminal extubation now and then comfort care measures thereafter.       Yamileth Montenegro DO, MPH  Internal Medicine

## 2017-05-14 NOTE — PROGRESS NOTES
0 - Received report from [facility]. Pt is intubated and stable. No complaints at this time. Will continue to monitor. 1120 - Pt is intubated and stable. No complaints at this time. Will continue to monitor. 1500 - Bedside and Verbal shift change report given to Shiva Bowling RN (oncoming nurse) by Ml Harris RN (offgoing nurse). Report included the following information SBAR, Kardex, Intake/Output, MAR, Recent Results and Med Rec Status.

## 2017-05-14 NOTE — PROGRESS NOTES
[facility]: [phone] ([phone])  [facility]: [phone]   [facility]: [phone]    Patient Name: Gisel Forbes  YOB: 1930    Date of Initial Consult: 5-8-17  Reason for Consult: Care Decisions  Requesting Provider: Dr Brooke Parker   Primary Care Physician: Shubham Bashir., DO      SUMMARY:   Gisel Forbes is a 80y.o. year old with a past history of colon cancer w/colostomy, hypertension, hypothyroidism, CKD stage 3,  who was admitted on 5/6/2017 from ER/Home with a diagnosis of AMS and  weakness, found to have CVA. Current medical issues leading to Palliative Medicine involvement include: patient was intubated in ER, imaging revealed left sided hemorrhage centered in the basal ganglia with significant interventricular extension and left to right shift, asked to support family to establish goals of care. PALLIATIVE DIAGNOSES:   1. Altered Mental Status  2. Vomiting  3. ICH  4. Dementia       PLAN:   1. [address] and I met with the family, her 2 dtrs-Lata and Rebekah Encinas, [place] 2 children and over the phone Emily's spouse. We reviewed her present condition, that although she opens eyes and at times may have followed command to squeeze a hand she is not expected to improve enough to live independently. Family still confused about Extubation, that if she does not do well post extubation, then we could consider a Trach. Explained that a decision had to be made now, if they wanted to honor patient's AMD to not employ machinery and heroics at EOL or if they wanted to continue with aggressive measures and move now to [facility] and PEG. Guerazan Medinaon very tearful and skeptical regarding what comfort care is but in the end all agreed it was what she would want.   2. AMS-candid conversation with family, that she is not going to be able to lead an independent life, that we are not able to predict if she will be able to talk once extubated, but that neuro was not optimistic. 3. Vomiting-explained that with the injury to the brain there is often complaints of head ache and sometime nausea and vomiting. Explained that she was intubated to protect her airway, that it did not appear that she was unable to breath, but was believed to be too altered to keep her airway patent especially when she was having emesis. 4. ICH-family had their questions answered about why she had bleeding into her brain, how it made one side paralyzed and why her recovery was not expected. 5. Dementia-grandson voiced that patient had been declining over the past few years, that she was distressed by her losses and was hard to manage b/c she lacked insight into her deficits. He felt she would definitely not want to live in a nursing home with others providing all of her care, and that she had lived a long and good life and it was time to let her go. 6. Goals of Care and Code Status- family agreed to switch now to COMFORT CARE-they understand the process, the compassionate extubation will allow her to talk if she is able and if she can eat we will ask SLP to assess her to find the safest textures to offer her fluids and nutrition. That we will have meds to treat symptoms that arise, such as pain or sob or n/v or seizures. That we will not hasten her passing, that our focus will be her comfort and to support them. They agreed that when she passes, will not do CPR or SHOCKS or [id number] to be ordered, will get DDNR if needs to leave hospital. Explained that it may not be fast, that she could in fact live many days to a week or two even if she is not eating or drinking very much or at all. That if she appears to be stable we will look to move her out of hospital and into home with [facility] or if family can't get 24/7 caregivers maybe a nursing home for comfort care if her insurance will allow for this.  Family were in agreement and went to visit with patient. 7. Initial consult note routed to primary continuity provider  8. Communicated plan of care with: Palliative IDT, ICU team, Attending. GOALS OF CARE:     [====Goals of Care====]  GOALS OF CARE:  Patient / health care proxy stated goals:       TREATMENT PREFERENCES:   Code Status: DNR    Advance Care Planning:  Advance Care Planning 5/7/2017   Patient's Healthcare Decision Maker is: Named in scanned ACP document   Primary Decision Maker Name -   Primary Decision Maker Phone Number -   Confirm Advance Directive Yes, not on file       Other:    The palliative care team has discussed with patient / health care proxy about goals of care / treatment preferences for patient.  [====Goals of Care====]    Advance Care Planning 5/7/2017   Patient's Healthcare Decision Maker is: Named in scanned ACP document   Primary Decision Maker Name -   Primary Decision Maker Phone Number -   Confirm Advance Directive Yes, not on file      Stef [phone]     HISTORY:     History obtained from: Chart    CHIEF COMPLAINT: AMS and Vomiting with weakness    HPI/SUBJECTIVE:  99.2, 99,150/67, 18 intubated at 100%, wt 86lb. voiding, Colostomy 50ml. MAR-Nebs, Norvasc, Folvite, Labetalol, Cardene, Protonix, Pericolace, Levaquin. LABS-WBC 18.7->16.5->14.9->17.1, H&H 10.3 & 30.4, Plat 162, INR 1.2, BUN/Creat 11/1.03->17/1.43->18/1.28, Ammonia 32, HgbA1c 6.1, Albumin 3.4, TSH 0.88  Head CT-Large intraparenchymal hemorrhage centered in the left basal ganglia, slightly improved from the prior study, with improved right to left midline shift. Persistent intraventricular extension with improvement in the left  lateral ventricle and increased/new hemorrhage in the right ventricle. A  possible small new areas of subarachnoid hemorrhage. Head CT-There is a large intraparenchymal hemorrhage in the region the left basal  ganglia with mass effect and midline shift to the right by 6 mm.  There is  significant extension of this hemorrhage into the ventricular system. There is  dilatation of the right lateral ventricle. Significant small vessel ischemic  disease seen  CXR-  Adequately positioned support devices.         Neuro Consult-\"Large Left ICH:  Neurosurgery evaluated and did no think there was a surgical role. ICH score suggests 97% mortality. Discussed with her daughter the significant residual disability the patient would be left with even if she survived and could be extubated. 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 The daughter said she spoke with palliative care for 2 hours about all of this as well.     Family is still deciding on plans. Discussed with Dr. Evie Ryan. Getting CT head to evaluate for worsening hydrocephalus\"  Neurosurgery consult-dominant hemisphere large basil ganglia hemorrhage with interventricular extension  PLAN:This is all assuming the setting of dementia and colon cancer. This carries a very poor prognosis for any quality of life. Neurosurgical intervention would not improve the outcome.   Discussions are being had with the power of   The patient is:   [] Verbal and participatory  [x] Non-participatory due to:   intubated    Clinical Pain Assessment (nonverbal scale for nonverbal patients):    Resting comfortably-no evidence of pain at our visit       FUNCTIONAL ASSESSMENT:     Palliative Performance Scale (PPS):30%          PSYCHOSOCIAL/SPIRITUAL SCREENING:     Advance Care Planning:  Advance Care Planning 5/7/2017   Patient's Healthcare Decision Maker is: Named in scanned ACP document   Primary Decision Maker Name -   Primary Decision Maker Phone Number -   Confirm Advance Directive Yes, not on file        Any spiritual / Samaritan concerns:  [] Yes /  [x] No    Caregiver Burnout:  [] Yes /  [x] No /  [] No Caregiver Present      Anticipatory grief assessment:   [x] Normal  / [] Maladaptive       ESAS Anxiety:      ESAS Depression:          REVIEW OF SYSTEMS:     Positive and pertinent negative findings in ROS are noted above in HPI. The following systems were [] reviewed / [x] unable to be reviewed as noted in HPI  Other findings are noted below. Systems: constitutional, ears/nose/mouth/throat, respiratory, gastrointestinal, genitourinary, musculoskeletal, integumentary, neurologic, psychiatric, endocrine. Positive findings noted below. Modified ESAS Completed by: provider                                            PHYSICAL EXAM:     Wt Readings from Last 3 Encounters:   05/14/17 40.4 kg (89 lb 1.1 oz)   05/03/17 41.2 kg (90 lb 12.8 oz)   03/21/17 41.9 kg (92 lb 6.4 oz)     Blood pressure 143/64, pulse 97, temperature 99.7 °F (37.6 °C), resp. rate 19, height 5' 4\" (1.626 m), weight 40.4 kg (89 lb 1.1 oz), SpO2 100 %.   Pain:  Pain Scale 1: Visual  Pain Intensity 1: 0                 Last bowel movement: colostomy 170 ml    Constitutional:intubated, eyes closed at my visit, did not follow commands, face was relaxed, not grimacing         HISTORY:     Active Problems:    Cerebral hemorrhage (HCC) (5/6/2017)      Dementia (5/9/2017)      Vomiting (5/9/2017)      Altered mental status (5/9/2017)      Intraventricular hemorrhage, nontraumatic (HCC) (5/13/2017)      Past Medical History:   Diagnosis Date    ACP (advance care planning) 11/17/2016    Anemia NEC     Colon cancer ([facility] [address] 4/23/2010    Colon polyps     Descending colon; Sigmoid colon X2    Diverticulosis     Gastric polyps     GERD (gastroesophageal reflux disease)     Glaucoma 4/23/2010    Hypertension 4/23/2010    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   Social History   Substance Use Topics    Smoking status: Former Smoker     Packs/day: 0.25     Years: 10.00     Quit date: 1992    Smokeless tobacco: Never Used    Alcohol use No     Allergies   Allergen Reactions    Penicillins Not Reported This Time      Current Facility-Administered Medications   Medication Dose Route Frequency    levoFLOXacin (LEVAQUIN) 500 mg in D5W IVPB  500 mg IntraVENous ONCE    promethazine (PHENERGAN) suppository 25 mg  25 mg Rectal Q6H PRN    haloperidol (HALDOL) 2 mg/mL oral solution 1-2 mg  1-2 mg SubLINGual Q4H PRN    ondansetron (ZOFRAN ODT) tablet 4 mg  4 mg Oral Q8H PRN    LORazepam (ATIVAN) injection 0.5 mg  0.5 mg IntraVENous Q15MIN PRN    morphine (PF)  mg/30 ml   IntraVENous CONTINUOUS    glycopyrrolate (ROBINUL) injection 0.2 mg  0.2 mg IntraVENous Q6H PRN    scopolamine (TRANSDERM-SCOP) 1.5 mg  1.5 mg TransDERmal Q72H    acetaminophen (TYLENOL) tablet 650 mg  650 mg Oral Q4H PRN    acetaminophen (TYLENOL) suppository 650 mg  650 mg Rectal Q4H PRN    bisacodyl (DULCOLAX) tablet 5 mg  5 mg Oral DAILY PRN    bisacodyl (DULCOLAX) suppository 10 mg  10 mg Rectal DAILY PRN    albuterol (PROVENTIL VENTOLIN) nebulizer solution 2.5 mg  2.5 mg Nebulization Q4H PRN        LAB AND IMAGING FINDINGS:     Lab Results   Component Value Date/Time    WBC 17.1 05/14/2017 04:10 AM    HGB 10.5 05/14/2017 04:10 AM    PLATELET 136 97/11/9715 04:10 AM     Lab Results   Component Value Date/Time    Sodium 151 05/14/2017 04:10 AM    Potassium 3.7 05/14/2017 04:10 AM    Chloride 117 05/14/2017 04:10 AM    CO2 25 05/14/2017 04:10 AM    BUN 18 05/14/2017 04:10 AM    Creatinine 1.28 05/14/2017 04:10 AM    Calcium 9.5 05/14/2017 04:10 AM    Magnesium 2.1 05/14/2017 04:10 AM Phosphorus 2.9 05/14/2017 04:10 AM      Lab Results   Component Value Date/Time    AST (SGOT) 35 05/12/2017 02:45 AM    Alk. phosphatase 68 05/12/2017 02:45 AM    Protein, total 6.5 05/12/2017 02:45 AM    Albumin 2.7 05/12/2017 02:45 AM    Globulin 3.8 05/12/2017 02:45 AM    GGT 33 12/09/2015 02:06 PM     Lab Results   Component Value Date/Time    INR 1.2 05/14/2017 04:10 AM    Prothrombin time 14.7 05/14/2017 04:10 AM    aPTT 34.2 05/14/2017 04:10 AM      Lab Results   Component Value Date/Time    Iron 29 06/06/2016 11:47 AM    TIBC 217 06/06/2016 11:47 AM    Iron % saturation 13 06/06/2016 11:47 AM    Ferritin 140 05/03/2017 11:45 AM      No results found for: PH, PCO2, PO2  No components found for: [place]   Lab Results   Component Value Date/Time     05/07/2017 05:50 PM    CK - MB 2.9 05/07/2017 05:50 PM              Total time: 90  Counseling / coordination time, spent as noted above: 60  > 50% counseling / coordination?: yes with dtrs, 2 grandchildren   Prolonged service was provided for  [x]30 min   []75 min in face to face time in the presence of the patient, spent as noted above. Time Start: 3:00pmTime End 4:00pm  Note: this can only be billed with 47037 (initial) or 21  (follow up). If multiple start / stop times, list each separately.

## 2017-05-14 NOTE — PROGRESS NOTES
[facility]  ICU Progress Note      Name: Georgina Candelario   : 1930   MRN: [id number]   Date: 2017 11:29 AM     [x]I have reviewed the flowsheet and previous days notes. Events overnight reviewed and discussed with nursing staff. Vital signs and records reviewed. 81 yo female with PMH HTN, colon cancer s/p colostomy, glaucoma, who presented to the ED on  after a fall and unresponsiveness found to have large L ICH, intubated for airway protection. Pt not a candidate for surgical intervention per NS, poor prognosis. Awaiting family decisions for GOC. 2017  Tmax 100.5 overnight, urine cx +E.coli, prelim sputum NF  Back on cardene this am  Tolerating TF well-at goal  Na lower, not following commands    [x]The patient is unable to give any meaningful history or review of systems because the patient is:  [x]Intubated []Sedated   []Unresponsive      [x]The patient is critically ill on      [x]Mechanical ventilation []Pressors   []BiPAP []                 ROS:Review of systems not obtained due to patient factors.     Medication Review:  · Pressors -  · Sedation -  · Antibiotics -  · Pain -fentanyl  · GI/ DVT -protonix  · Others (other gtts)-cardene-on    Safety Bundles: VAP Bundle/ CAUTI/ Severe Sepsis Protocol/ Electrolyte Replacement Protocol    Vital Signs:    Visit Vitals    /79    Pulse 76    Temp 99.7 °F (37.6 °C)    Resp 15    Ht 5' 4\" (1.626 m)    Wt 40.4 kg (89 lb 1.1 oz)    SpO2 100%    BMI 15.29 kg/m2       O2 Device: Endotracheal tube, Ventilator       Temp (24hrs), Av.7 °F (37.6 °C), Min:99.1 °F (37.3 °C), Max:100.5 °F (38.1 °C)       Intake/Output:   Last shift:         Last 3 shifts:  190 -  0700  In: 3891.7 [I.V.:491.7]  Out: 2331 [Urine:1750]    Intake/Output Summary (Last 24 hours) at 17 0906  Last data filed at 17 0700   Gross per 24 hour   Intake          2321.67 ml   Output             1200 ml   Net 1121.67 ml       Ventilator Settings:  Ventilator  Mode: Assist control, VC+  Respiratory Rate  Resp Rate Observed: 17  Back-Up Rate: 12  Insp Time (sec): 39 sec  Insp Flow (l/min): 42 l/min  I:E Ratio: 1;4  Ventilator Volumes  Vt Set (ml): 350 ml  Vt Exhaled (Machine Breath) (ml): 319 ml  Vt Spont (ml): 396 ml  Ve Observed (l/min): 7.21 l/min  Ventilator Pressures  Pressure Support (cm H2O): 7 cm H2O  PIP Observed (cm H2O): 14 cm H2O  Plateau Pressure (cm H2O): 16 cm H2O  MAP (cm H2O): 7.1  PEEP/VENT (cm H2O): 5 cm H20  Auto PEEP Observed (cm H2O): 0 cm H2O    Physical Exam:    General/Neuro: Intubated, off sedation, moves L UE/LE spontaneously, WD RLE/RUE with NS, L pupil pinpoint  HEENT:  Anicteric sclerae; ETT in place  Resp:  Symmetrical chest expansion, adequate airway entry, no wheeze or rhonchi  CV:  Regular, no m  GI:  Abdomen soft, non-tender; (+) active bowel sounds, +colostomy  Extremities:  +2 pulses on all extremities; SCD in place, RUE erythema along vein  Skin:  Warm; no rashes/ lesions noted    DATA:     Current Facility-Administered Medications   Medication Dose Route Frequency    insulin detemir (LEVEMIR) injection 7 Units  7 Units SubCUTAneous Q24H    meropenem (MERREM) 1 g in 0.9% sodium chloride (MBP/ADV) 50 mL MBP  1 g IntraVENous Q12H    metoprolol tartrate (LOPRESSOR) tablet 12.5 mg  12.5 mg Oral Q12H    fentaNYL citrate (PF) injection 50 mcg  50 mcg IntraVENous Q1H PRN    niCARdipine (CARDENE) 25 mg in 0.9% sodium chloride 250 mL infusion  0-15 mg/hr IntraVENous TITRATE    labetalol (NORMODYNE;TRANDATE) 20 mg/4 mL (5 mg/mL) injection 10 mg  10 mg IntraVENous Q4H PRN    amLODIPine (NORVASC) tablet 10 mg  10 mg Oral DAILY    insulin lispro (HUMALOG) injection   SubCUTAneous Q6H    glucose chewable tablet 16 g  4 Tab Oral PRN    glucagon (GLUCAGEN) injection 1 mg  1 mg IntraMUSCular PRN    dextrose (D50W) injection syrg 12.5-25 g  25-50 mL IntraVENous PRN    ELECTROLYTE REPLACEMENT PROTOCOL  1 Each Other Q8H    ELECTROLYTE REPLACEMENT PROTOCOL  1 Each Other Q8H    ELECTROLYTE REPLACEMENT PROTOCOL  1 Each Other Q8H    PHARMACY INFORMATION NOTE  1 Each Other DAILY    chlorhexidine (PERIDEX) 0.12 % mouthwash 15 mL  15 mL Oral Q12H    ondansetron (ZOFRAN) injection 1 mg  1 mg IntraVENous Q6H PRN    acetaminophen (TYLENOL) tablet 650 mg  650 mg Oral Q4H PRN    acetaminophen (TYLENOL) suppository 650 mg  650 mg Rectal Q4H PRN    senna-docusate (PERICOLACE) 8.6-50 mg per tablet 2 Tab  2 Tab Oral QHS    bisacodyl (DULCOLAX) tablet 5 mg  5 mg Oral DAILY PRN    bisacodyl (DULCOLAX) suppository 10 mg  10 mg Rectal DAILY PRN    pantoprazole (PROTONIX) 40 mg in sodium chloride 0.9 % 10 mL injection  40 mg IntraVENous Q12H    albuterol (PROVENTIL VENTOLIN) nebulizer solution 2.5 mg  2.5 mg Nebulization X8R PRN    folic acid (FOLVITE) 1 mg, thiamine (B-1) 100 mg in 0.9% sodium chloride 50 mL ivpb   IntraVENous DAILY         Labs: Results:       Chemistry Recent Labs      05/14/17   0410 05/13/17   1500  05/13/17   0345  05/12/17   0245   GLU  147*   --   83  181*   NA  151*   --   156*  157*   K  3.7  4.4  3.7  4.8   CL  117*   --   122*  125*   CO2  25   --   24  22   BUN  18   --   19*  18   CREA  1.28   --   1.40*  1.48*   CA  9.5   --   9.4  9.3   AGAP  9   --   10  10   BUCR  14   --   14  12   AP   --    --    --   68   TP   --    --    --   6.5   ALB   --    --    --   2.7*   GLOB   --    --    --   3.8   AGRAT   --    --    --   0.7*      CBC w/Diff Recent Labs      05/14/17   0410  05/13/17   0345  05/12/17   0400   WBC  17.1*  15.0*  14.7*   RBC  3.55*  3.45*  3.64*   HGB  10.5*  10.4*  10.9*   HCT  31.5*  31.2*  32.7*   PLT  230  201  192   GRANS  84*  82*  86*   LYMPH  8*  6*  5*   EOS  0  0  0      Coagulation Recent Labs      05/14/17   0410  05/13/17   0345   PTP  14.7  15.1   INR  1.2  1.2   APTT  34.2  31.3       Liver Enzymes Recent Labs      05/12/17   0245   TP  6.5   ALB 2.7*   AP  68   SGOT  35      ABG Lab Results   Component Value Date/Time    PHI 7.502 (H) 05/14/2017 04:58 AM    PCO2I 31.3 (LL) 05/14/2017 04:58 AM    PO2I 83 05/14/2017 04:58 AM    HCO3I 24.5 05/14/2017 04:58 AM    FIO2I 30 05/14/2017 04:58 AM      Microbiology Recent Labs      05/12/17   1536  05/12/17   1420  05/12/17   1243   CULT  MODERATE  NORMAL RESPIRATORY TOM    >100,000  COLONIES/mL  ESCHERICHIA COLI  *  NO GROWTH 2 DAYS          Telemetry: [x]Sinus  []A-flutter []Paced    []A-fib []Multiple PVCs                    Imaging:  [x]I have personally reviewed the patients radiographs  []Radiographs reviewed with radiologist   []No change from prior, tubes and lines in adequate position  []Improved   []Worsening    5/13/17 CXR       5/9/17 CT head  1. Large intraparenchymal hemorrhage centered in the left basal ganglia,  slightly improved from the prior study, with improved right to left midline  shift. Persistent intraventricular extension with improvement in the left  lateral ventricle and increased/new hemorrhage in the right ventricle. A  possible small new areas of subarachnoid hemorrhage. 5/6/17 CT head    There is a large intraparenchymal hemorrhage in the region the left basal  ganglia with mass effect and midline shift to the right by 6 mm. There is  significant extension of this hemorrhage into the ventricular system. There is  dilatation of the right lateral ventricle. Significant small vessel ischemic  disease seen.       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, some improvement repeat CT 5/9 (see above report)  - Neurosurgery and neurology following  - No plans for surgical intervention-poor prognosis per NS  - BP control  - ICH orderset  · Pulm: VDRF due to above VENT DAY 9  - SBT tolerates well-unlikely able to protect airway due to low GCS, no plans for extubation until [address] established  - Vent orderset  - CXR -repeat today   - Sputum pending-prelim NF, possible aspiration, on merrem  · CV: HTN, thrombophlebitis  - SBP goal<140  - On norvasc, metoprolol-increase prn  - cardene gtt, labetalol prn  - Warm compresses to RUE  · GI  - Continue protonix  - TF at goal, cont free water  · Renal/Metabolic-hypomagnesemia-resolved, hypernatremia  - free water for hypernatremia-much improved  - Replace lytes per protocol  ·   - cont purewick or straight cath prn for now, singh can be replaced if needed  · ID-fever-+E.coli UTI, possible aspiration +/- central  - monitor temps, follow leukocytosis, tylenol prn  - Continue merrem for now, blood cx NGTD, sputum cx final pending    Endo-FSG with SSI, increase levemir prn        PLAN:   · Prophylaxis - DVT-SCD, GI-protonix  · Discussed in interdisciplinary rounds  · FULL CODE (see ACP on file)  · Palliative care following, continue ongoing family discussions-the family has had multiple lengthy discussions and have not yet been able to decide to proceed with trach/PEG or WOC. Additional family member (pt daughter) arrived to assist her sister Millie Getting with decision making-but has not been receptive to discussions. Family meeting to be held today via phone with Dr. Juan Antonio Perez if possible. Need decision by tomorrow for trach/PEG w/surgical consult in am v WOC.   · Consider ethics consult          The patient is: [] acutely ill Risk of deterioration: [] moderate    [x] critically ill  [x] high     [x]See my orders for details    My assessment/plan was discussed with:  [x]nursing []PT/OT    [x]respiratory therapy [x]   []family [x]Dr. Juan Antonio Perez by phone         YENIFER Herrera

## 2017-05-15 NOTE — PROGRESS NOTES
Referral to in hospice done. Spoke with [facility] on  The phone.

## 2017-05-15 NOTE — ROUTINE PROCESS
Bedside / verbal shift change report given to [address] [place] [address] Lorenzo  (oncoming nurse) by Haydee Khalil RN (offgoing nurse). Report included the following information SBAR, Kardex, Intake/Output, MAR and Recent Results.

## 2017-05-15 NOTE — ROUTINE PROCESS
1945 Bedside and Verbal shift change report given to [employer] (oncoming nurse) by Josh Evans RN   (offgoing nurse). Report included the following information SBAR and Kardex. The patient was handed off to [employer] and a PCA verify was completed. At shift change, patient RR had increased significantly as had patient respiratory rate. Ativan prn was given with [employer] as documented on [facility],.

## 2017-05-15 NOTE — PROGRESS NOTES
2005: Bedside report given by Don Valdivia. 2015: Patient being transferred from ICU, arrived via bed. Received pt unresponsive, with labored breathing, tachypneic. Pt on 4 Lpm via NC w/ humidifier. With PCA Morphine infusing at a rate of 2 mg/hr, rate verified with KASEY Monreal. Family at bedside. 2026: Ativan 0.5 mg IV given for dyspnea. Patient wet with urine, gave patient and bath and positioned to her right side.

## 2017-05-15 NOTE — PROGRESS NOTES
[facility]  Hospitalist Division    Daily progress Note    Patient: Geri Bernard MRN: [id number]  CSN: [id number]    YOB: 1930  Age: 80 y.o. Sex: female    DOA: 5/6/2017 LOS:  LOS: 9 days                    Subjective:     CC: ICH     Pt s/e @ bedside  S/p terminal extubation  Pt on comfort care    Objective:      Visit Vitals    /62 (BP 1 Location: Left arm, BP Patient Position: Supine)    Pulse 95    Temp 98 °F (36.7 °C)    Resp 18    Ht 5' 4\" (1.626 m)    Wt 40.4 kg (89 lb 1.1 oz)    SpO2 100%    BMI 15.29 kg/m2       Physical Exam:  General Appearance: Unresponsive, agonal breathing at times  Neck: trachea midline, no JVD  Lungs: CTA, no wheezes, rales or rhonchi appreciated  CV: RRR, no m/r/g  Abdomen: soft, non-tender, normal bowel sounds  Extremities: no cyanosis, no peripheral edema    Intake and Output:  Current Shift:     Last three shifts:  05/13 1901 - 05/15 0700  In: 2886.7 [I.V.:616.7]  Out: 400 [Urine:300]    No results found for this or any previous visit (from the past 24 hour(s)).       Current Facility-Administered Medications:     promethazine (PHENERGAN) suppository 25 mg, 25 mg, Rectal, Q6H PRN, Adriano Love MD    haloperidol (HALDOL) 2 mg/mL oral solution 1-2 mg, 1-2 mg, SubLINGual, Q4H PRN, Adriano Love MD    ondansetron (ZOFRAN ODT) tablet 4 mg, 4 mg, Oral, Q8H PRN, Adriano Love MD    LORazepam (ATIVAN) injection 0.5 mg, 0.5 mg, IntraVENous, Q15MIN PRN, Adriano Love MD, 0.5 mg at 05/14/17 2026    morphine (PF)  mg/30 ml, , IntraVENous, CONTINUOUS, Adriano Love MD    glycopyrrolate (ROBINUL) injection 0.2 mg, 0.2 mg, IntraVENous, Q6H PRN, Adriano Love MD    scopolamine (TRANSDERM-SCOP) 1.5 mg, 1.5 mg, TransDERmal, Q72H, Adriano Love MD, 1.5 mg at 05/14/17 1701    acetaminophen (TYLENOL) tablet 650 mg, 650 mg, Oral, Q4H PRN, Lucina Chambers MD, 650 mg at 05/12/17 1242    acetaminophen (TYLENOL) suppository 650 mg, 650 mg, Rectal, Q4H PRN, Jeanne Vela MD    bisacodyl (DULCOLAX) tablet 5 mg, 5 mg, Oral, DAILY PRN, Jeanne Vela MD    bisacodyl (DULCOLAX) suppository 10 mg, 10 mg, Rectal, DAILY PRN, Jeanne Vela MD    albuterol (PROVENTIL VENTOLIN) nebulizer solution 2.5 mg, 2.5 mg, Nebulization, Q4H PRN, Jeanne Vela MD, 2.5 mg at 05/14/17 8336    Lab Results   Component Value Date/Time    Glucose 147 05/14/2017 04:10 AM    Glucose 83 05/13/2017 03:45 AM    Glucose 181 05/12/2017 02:45 AM    Glucose 152 05/11/2017 03:40 AM    Glucose 142 05/10/2017 03:40 AM        Assessment/Plan     Active Problems:    Cerebral hemorrhage ([address] (5/6/2017)      Dementia (5/9/2017)      Vomiting (5/9/2017)      Altered mental status (5/9/2017)      Intraventricular hemorrhage, nontraumatic (HCC) (5/13/2017)       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

## 2017-05-15 NOTE — CONSULTS
Pulmonary:    PCCM notes read: Palliative Care and comfort care only  We will be available as needed: please call ME for any PULMONARY questions/concerns/interventions or procedures.   [phone]

## 2017-05-15 NOTE — HOSPICE
Hospice consult performed with patients family . [facility], mission and goal reviewed. Questions asked and answered appropriately. Patient daughter stated she would like to have patient transported to a facility in [place] where she will have more help from family. Patient Daughter chose not to have patient under Inpatient hospice at this time. Hospice  information given to family if any change in decision.

## 2017-05-15 NOTE — PROGRESS NOTES
Assumed care of patient, patient in in bed resting comfortably, patient is currently on comfort care.  PCA pump verified with off going nurse Ayse PARKS     1584 Patient PCA dose change to 1.5 mg / per hour, per daughter request. Verified with Monserrat Roe RN

## 2017-05-15 NOTE — PROGRESS NOTES
[facility] [place]: [phone]  [facility]: [phone]   [facility]: [phone]    Patient Name: Jacinto Morrison  YOB: 1930    Date of Initial Consult: 5-8-17  Reason for Consult: Care Decisions  Requesting Provider: Dr Patricio Dias   Primary Care Physician: Ricardo Puri,       SUMMARY:   Jacinto Morrison is a 80y.o. year old with a past history of colon cancer w/colostomy, hypertension, hypothyroidism, CKD stage 3,  who was admitted on 5/6/2017 from ER/Home with a diagnosis of AMS and  weakness, found to have CVA. Current medical issues leading to Palliative Medicine involvement include: patient was intubated in ER, imaging revealed left sided hemorrhage centered in the basal ganglia with significant interventricular extension and left to right shift, asked to support family to establish goals of care. PALLIATIVE DIAGNOSES:   1. Altered Mental Status  2. Vomiting  3. ICH  4. Dementia       PLAN:   1. Eliane Rinne NP and I met with the dtr Gretchen Oconnor, at patient's bedside. Patient is not alerting to voice, is peaceful and breathing rather regularly and unlabored. 2. AMS-dtr disappointed that patient post extubation is not more alert, has not been able to talk and initially was having resp distress, so the MORPHINE PCA was at 2mg/hr. She wondered if patient would be more alert if the morphine dose was decreased, explained that we will dial down by 0.5mg per hour, but that we are cautious b/c we don't want patient to feel uncomfortable, if this is related to increase in symptoms we will go back up. 3. Vomiting-explained that with the injury to the brain there is often complaints of head ache and sometime nausea and vomiting.  Explained that she was intubated to protect her airway, that it did not appear that she was unable to breath, but was believed to be too altered to keep her airway patent especially when she was having emesis. 4. ICH-family had their questions answered about why she had bleeding into her brain, how it made one side paralyzed and why her recovery was not expected. 5. Dementia-patient has remained quiet, family agreed she appears comfortable but the dtr is hoping she will become more alert if the narcotic is dialed down. 6. Goals of Care and Code Status- dtr agreed to DNR yesterday. She is now on comfort measures, the plan was explained again to Tiburcio Wolfe, that since she does not appear to be actively passing, we need to have a plan for where patient can go for ongoing comfort care. Discussed going to her apt or to nursing home, she is not receptive to either, explained that hospital is not appropriate for this level of care. Shared with Care Manager and attending. 7. Initial consult note routed to primary continuity provider  8. Communicated plan of care with: Palliative IDT,  Attending. GOALS OF CARE:     [====Goals of Care====]  GOALS OF CARE:  Patient / health care proxy stated goals:       TREATMENT PREFERENCES:   Code Status: DNR    Advance Care Planning:  Advance Care Planning 5/7/2017   Patient's Healthcare Decision Maker is: Named in scanned ACP document   Primary Decision Maker Name -   Primary Decision Maker Phone Number -   Confirm Advance Directive Yes, not on file       Other:    The palliative care team has discussed with patient / health care proxy about goals of care / treatment preferences for patient.  [====Goals of Care====]    Advance Care Planning 5/7/2017   Patient's Healthcare Decision Maker is: Named in scanned ACP document   Primary Decision Maker Name -   Primary Decision Maker Phone Number -   Confirm Advance Directive Yes, not on file      Stef [phone]     HISTORY:     History obtained from: Chart    CHIEF COMPLAINT: AMS and Vomiting with weakness    HPI/SUBJECTIVE:  98. 95,103/62, 18 intubated at 100%, wt 86lb. voiding, Colostomy 50ml.    MAR-Jerson, (Norvasc, Folvite, Labetalol, Cardene, Protonix), Pericolace, (Levaquin.) Scop Patch, Morphine PCA at 2mg/hr  LABS-WBC 18.7->16.5->14.9->17.1, H&H 10.3 & 30.4, Plat 162, INR 1.2, BUN/Creat 11/1.03->17/1.43->18/1.28, Ammonia 32, HgbA1c 6.1, Albumin 3.4, TSH 0.88  Head CT-Large intraparenchymal hemorrhage centered in the left basal ganglia, slightly improved from the prior study, with improved right to left midline shift. Persistent intraventricular extension with improvement in the left  lateral ventricle and increased/new hemorrhage in the right ventricle. A  possible small new areas of subarachnoid hemorrhage. Head CT-There is a large intraparenchymal hemorrhage in the region the left basal  ganglia with mass effect and midline shift to the right by 6 mm. There is  significant extension of this hemorrhage into the ventricular system. There is  dilatation of the right lateral ventricle. Significant small vessel ischemic  disease seen  CXR-  Adequately positioned support devices.         Neuro Consult-\"Large Left ICH:  Neurosurgery evaluated and did no think there was a surgical role. ICH score suggests 97% mortality. Discussed with her daughter the significant residual disability the patient would be left with even if she survived and could be extubated. 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 The daughter said she spoke with palliative care for 2 hours about all of this as well.     Family is still deciding on plans. Discussed with Dr. Daquan Larson. Getting CT head to evaluate for worsening hydrocephalus\"  Neurosurgery consult-dominant hemisphere large basil ganglia hemorrhage with interventricular extension  PLAN:This is all assuming the setting of dementia and colon cancer. This carries a very poor prognosis for any quality of life.  Neurosurgical intervention would not improve the outcome. Discussions are being had with the power of   The patient is:   [] Verbal and participatory  [x] Non-participatory due to:   unresponsive    Clinical Pain Assessment (nonverbal scale for nonverbal patients):    Resting comfortably-no evidence of pain at our visit       FUNCTIONAL ASSESSMENT:     Palliative Performance Scale (PPS):20%          PSYCHOSOCIAL/SPIRITUAL SCREENING:     Advance Care Planning:  Advance Care Planning 5/7/2017   Patient's Healthcare Decision Maker is: Named in scanned ACP document   Primary Decision Maker Name -   Primary Decision Maker Phone Number -   Confirm Advance Directive Yes, not on file        Any spiritual / Adventist concerns:  [] Yes /  [x] No    Caregiver Burnout:  [] Yes /  [x] No /  [] No Caregiver Present      Anticipatory grief assessment:   [x] Normal  / [] Maladaptive       ESAS Anxiety:      ESAS Depression:          REVIEW OF SYSTEMS:     Positive and pertinent negative findings in ROS are noted above in HPI. The following systems were [] reviewed / [x] unable to be reviewed as noted in HPI  Other findings are noted below. Systems: constitutional, ears/nose/mouth/throat, respiratory, gastrointestinal, genitourinary, musculoskeletal, integumentary, neurologic, psychiatric, endocrine. Positive findings noted below. Modified ESAS Completed by: provider                                            PHYSICAL EXAM:     Wt Readings from Last 3 Encounters:   05/14/17 40.4 kg (89 lb 1.1 oz)   05/03/17 41.2 kg (90 lb 12.8 oz)   03/21/17 41.9 kg (92 lb 6.4 oz)     Blood pressure 93/60, pulse (!) 107, temperature 98.6 °F (37 °C), resp. rate 20, height 5' 4\" (1.626 m), weight 40.4 kg (89 lb 1.1 oz), SpO2 99 %.   Pain:  Pain Scale 1: Visual  Pain Intensity 1: 0                 Last bowel movement: colostomy 50 ml    Constitutional: breathing is regular, unlabored on nasal cannula. eyes closed at my visit, did not follow commands, face was relaxed, not grimacing         HISTORY:     Active Problems:    Cerebral hemorrhage (HCC) (5/6/2017)      Dementia (5/9/2017)      Vomiting (5/9/2017)      Altered mental status (5/9/2017)      Intraventricular hemorrhage, nontraumatic (HCC) (5/13/2017)      Past Medical History:   Diagnosis Date    ACP (advance care planning) 11/17/2016    Anemia NEC     Colon cancer ([facility] [address] 4/23/2010    Colon polyps     Descending colon; Sigmoid colon X2    Diverticulosis     Gastric polyps     GERD (gastroesophageal reflux disease)     Glaucoma 4/23/2010    Hypertension 4/23/2010    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   Social History   Substance Use Topics    Smoking status: Former Smoker     Packs/day: 0.25     Years: 10.00     Quit date: 1992    Smokeless tobacco: Never Used    Alcohol use No     Allergies   Allergen Reactions    Penicillins Not Reported This Time      Current Facility-Administered Medications   Medication Dose Route Frequency    promethazine (PHENERGAN) suppository 25 mg  25 mg Rectal Q6H PRN    haloperidol (HALDOL) 2 mg/mL oral solution 1-2 mg  1-2 mg SubLINGual Q4H PRN    ondansetron (ZOFRAN ODT) tablet 4 mg  4 mg Oral Q8H PRN    LORazepam (ATIVAN) injection 0.5 mg  0.5 mg IntraVENous Q15MIN PRN    morphine (PF)  mg/30 ml   IntraVENous CONTINUOUS    glycopyrrolate (ROBINUL) injection 0.2 mg  0.2 mg IntraVENous Q6H PRN    scopolamine (TRANSDERM-SCOP) 1.5 mg  1.5 mg TransDERmal Q72H    acetaminophen (TYLENOL) tablet 650 mg  650 mg Oral Q4H PRN    acetaminophen (TYLENOL) suppository 650 mg  650 mg Rectal Q4H PRN    bisacodyl (DULCOLAX) tablet 5 mg  5 mg Oral DAILY PRN    bisacodyl (DULCOLAX) suppository 10 mg  10 mg Rectal DAILY PRN    albuterol (PROVENTIL VENTOLIN) nebulizer solution 2.5 mg  2.5 mg Nebulization Q4H PRN        LAB AND IMAGING FINDINGS:     Lab Results   Component Value Date/Time    WBC 17.1 05/14/2017 04:10 AM    HGB 10.5 05/14/2017 04:10 AM    PLATELET 207 48/14/7414 04:10 AM     Lab Results   Component Value Date/Time    Sodium 151 05/14/2017 04:10 AM    Potassium 3.7 05/14/2017 04:10 AM    Chloride 117 05/14/2017 04:10 AM    CO2 25 05/14/2017 04:10 AM    BUN 18 05/14/2017 04:10 AM    Creatinine 1.28 05/14/2017 04:10 AM    Calcium 9.5 05/14/2017 04:10 AM    Magnesium 2.1 05/14/2017 04:10 AM    Phosphorus 2.9 05/14/2017 04:10 AM      Lab Results   Component Value Date/Time    AST (SGOT) 35 05/12/2017 02:45 AM    Alk. phosphatase 68 05/12/2017 02:45 AM    Protein, total 6.5 05/12/2017 02:45 AM    Albumin 2.7 05/12/2017 02:45 AM    Globulin 3.8 05/12/2017 02:45 AM    GGT 33 12/09/2015 02:06 PM     Lab Results   Component Value Date/Time    INR 1.2 05/14/2017 04:10 AM    Prothrombin time 14.7 05/14/2017 04:10 AM    aPTT 34.2 05/14/2017 04:10 AM      Lab Results   Component Value Date/Time    Iron 29 06/06/2016 11:47 AM    TIBC 217 06/06/2016 11:47 AM    Iron % saturation 13 06/06/2016 11:47 AM    Ferritin 140 05/03/2017 11:45 AM      No results found for: PH, PCO2, PO2  No components found for: [place]   Lab Results   Component Value Date/Time     05/07/2017 05:50 PM    CK - MB 2.9 05/07/2017 05:50 PM              Total time: 40  Counseling / coordination time, spent as noted above: 30  > 50% counseling / coordination?: yes with dtr  Prolonged service was provided for  []30 min   []75 min in face to face time in the presence of the patient, spent as noted above. Time Start: Time End   Note: this can only be billed with 71501 (initial) or 73077 (follow up).   If multiple start / stop times, list each separately.

## 2017-05-15 NOTE — PROGRESS NOTES
Spoke withpts dtr leroy. She wants  Pt to  Be in  inpt hospice here , if lives longer than few days, wants pt to  Go to  A facility, as there  Are  No caregivers at  Home. She understands she  Will have to pay  fo rm  And board at  A facility. Explained to her pt may not  Meet criteria for inpt hospice, if does not, then  Would need to  Go  Directly to  A facility. She  Wants me  To post  To  Facilities in [address] [place] [address] [place], where  She  And rest  Of her family live. Posted in edc.

## 2017-05-15 NOTE — PROGRESS NOTES
attempted to complete a follow up visit with patient in room 2104 this morning, but found her resting peacefully at his time. Spoke with a family member who was present.   Chaplains will continue to follow and will provide pastoral care on an as needed/requested basis    Chaplain Gary Dumont   Board Certified [address] [place] [address]   [phone]

## 2017-05-16 NOTE — PROGRESS NOTES
1930: Bedside verbal shift report received from Dante Zeng RN (preceptor). Pt is sleeping in bed, family in room, on 5L O2 via NC, PCA morphine at 1mg.     0720: Bedside and Verbal shift change report given to [place], RN (oncoming nurse) by Jessie Munson RN (offgoing nurse) and Dante Zeng RN. Report included the following information SBAR, Kardex, Intake/Output, MAR and Recent Results.

## 2017-05-16 NOTE — PROGRESS NOTES
No accepting nsg facility  Yet. Spoke with  dtr she states  Hospice was here last pm  And spoke with  Her. They  Are evaluating for inpt hospice.

## 2017-05-16 NOTE — PROGRESS NOTES
[facility]  Hospital[facility]    Daily progress Note    Patient: Marshall Watkins MRN: [id number]  CSN: [id number]    YOB: 1930  Age: 80 y.o. Sex: female    DOA: 5/6/2017 LOS:  LOS: 10 days                    Subjective:     CC: ICH     Pt s/e @ bedside  Pt on comfort care    Objective:      Visit Vitals    /73 (BP 1 Location: Right arm, BP Patient Position: At rest)    Pulse (!) 109    Temp 97.4 °F (36.3 °C)    Resp 12    Ht 5' 4\" (1.626 m)    Wt 40.4 kg (89 lb 1.1 oz)    SpO2 96%    BMI 15.29 kg/m2       Physical Exam:  General Appearance: Unresponsive, agonal breathing at times  Neck: trachea midline, no JVD  Lungs: CTA, no wheezes, rales or rhonchi appreciated  CV: RRR, no m/r/g    Intake and Output:  Current Shift:     Last three shifts:  05/14 1901 - 05/16 0700  In: -   Out: 50     No results found for this or any previous visit (from the past 24 hour(s)).       Current Facility-Administered Medications:     promethazine (PHENERGAN) suppository 25 mg, 25 mg, Rectal, Q6H PRN, Marcela Pearl MD    haloperidol (HALDOL) 2 mg/mL oral solution 1-2 mg, 1-2 mg, SubLINGual, Q4H PRN, Marcela Pearl MD    ondansetron (ZOFRAN ODT) tablet 4 mg, 4 mg, Oral, Q8H PRN, Marcela Pearl MD    LORazepam (ATIVAN) injection 0.5 mg, 0.5 mg, IntraVENous, Q15MIN PRN, Marcela Pearl MD, 0.5 mg at 05/14/17 2026    morphine (PF)  mg/30 ml, , IntraVENous, CONTINUOUS, Marcela Pearl MD    glycopyrrolate (ROBINUL) injection 0.2 mg, 0.2 mg, IntraVENous, Q6H PRN, Marcela Pearl MD    scopolamine (TRANSDERM-SCOP) 1.5 mg, 1.5 mg, TransDERmal, Q72H, Marcela Pearl MD, 1.5 mg at 05/14/17 1701    acetaminophen (TYLENOL) tablet 650 mg, 650 mg, Oral, Q4H PRN, Jeanne Vela MD, 650 mg at 05/12/17 1242    acetaminophen (TYLENOL) suppository 650 mg, 650 mg, Rectal, Q4H PRN, Jeanne Vela MD    bisacodyl (DULCOLAX) tablet 5 mg, 5 mg, Oral, DAILY PRN, Joy MARINO Samara Abreu MD    bisacodyl (DULCOLAX) suppository 10 mg, 10 mg, Rectal, DAILY PRN, Gil Morrow MD    albuterol (PROVENTIL VENTOLIN) nebulizer solution 2.5 mg, 2.5 mg, Nebulization, Q4H PRN, Gil Morrow MD, 2.5 mg at 05/14/17 6845    Lab Results   Component Value Date/Time    Glucose 147 05/14/2017 04:10 AM    Glucose 83 05/13/2017 03:45 AM    Glucose 181 05/12/2017 02:45 AM    Glucose 152 05/11/2017 03:40 AM    Glucose 142 05/10/2017 03:40 AM        Assessment/Plan     Active Problems:    Cerebral hemorrhage ([facility] [address] (5/6/2017)      Dementia (5/9/2017)      Vomiting (5/9/2017)      Altered mental status (5/9/2017)      Intraventricular hemorrhage, nontraumatic (HCC) (5/13/2017)       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

## 2017-05-16 NOTE — PROGRESS NOTES
aked dtr  What facility  She wishes for me to  purue on list  i gave her, she is procrastinating . She states she has to  Visit them, told her there is no time to visit, she states cant make  A decision in one day, told her we started yesterday  She was supposed to look over list  And choose  A couple of facilities, told her  2 facilities want her to call them before they  Will accept and she needs to do so  [address] her  Then she will end  Up  Going there. Referred to jes beltrán  About inpt  Hospice, she  States they  Said dtr  Refused to sign  Papers for inpt hospice and wants to  End directly  To [place], but  [facility] a facility.

## 2017-05-16 NOTE — PROGRESS NOTES
Spoke to Oliva Ponce, she  eval'd pt  Last pm, she will let me know if pt gets approved, if does  She will have dtr  Sign papers at that  Time.

## 2017-05-16 NOTE — ROUTINE PROCESS
Bedside / verbal shift change report given to InfoRemate   (oncoming nurse) by Maryanne Campo RN (offgoing nurse). Report included the following information SBAR, Kardex, Intake/Output, MAR and Recent Results.  Pca pump verified

## 2017-05-16 NOTE — PROGRESS NOTES
Assumed pt care from [facility] Received pt resting in bed, unresponsive. On 5 Lpm via NC w/ humidifier. Appears to be comfortable on PCA Morphine infusing at a rate of 1.5 mg/hr.

## 2017-05-16 NOTE — ROUTINE PROCESS
Assumed are of patient, patient is currently on comfort care. Patient PCA pump verified with off going nurse. Patient family is at bedside.  Sbar report received from [address] [place] [address] Critical cultural , patient is positive for MRSA in sputum

## 2017-05-16 NOTE — HOSPICE
[facility] RN Note:  Request for hospice to re-evaluate patient for possible comfort bed. Pt has had a decline and might be too fragile to transport at this time. Hospice RN has requested Dr. Desire Fry to review patient for comfort bed-routine hospice status. To eval at bedside this afternoon. Will update with more info. UPDATE-Per Dr. Desire Fry, patient is not imminent (<48hrs expectancy)-actively dying and does not qualify for a comfort bed at this time. Dr. Desire Fry has requested that [facility] evaluate patient for admission. [facility] Intake # 1 [phone]    Care Management-[place] to assist with finding out transport costs co-pay for family. Please call with any questions or concerns. Thank You for allowing us to participate in the care of this patient.      Raul Chery, [address] [facility]   [phone] office/24 hrs/weekends  [phone] cell (M-F 8-5pm)

## 2017-05-16 NOTE — PROGRESS NOTES
[facility]: [phone]  [facility]: [phone]   [facility]: [phone]    Patient Name: Dustin Jung  YOB: 1930    Date of Initial Consult: 5-8-17  Reason for Consult: Care Decisions  Requesting Provider: Dr Fay Chang   Primary Care Physician: Ronda Pritchett., DO      SUMMARY:   Dustin Jung is a 80y.o. year old with a past history of colon cancer w/colostomy, hypertension, hypothyroidism, CKD stage 3,  who was admitted on 5/6/2017 from ER/Home with a diagnosis of AMS and  weakness, found to have CVA. Current medical issues leading to Palliative Medicine involvement include: patient was intubated in ER, imaging revealed left sided hemorrhage centered in the basal ganglia with significant interventricular extension and left to right shift, asked to support family to establish goals of care. PALLIATIVE DIAGNOSES:   1. Altered Mental Status  2. Vomiting  3. ICH  4. Dementia       PLAN:   1. Florentin Hamilton NP and I met with patient, she is not rousing to voice or touch, breathing is quite strong, no apnea appreciated, extremities warm to touch. Her son Hemant Masterson at bedside, had just arrived from [place], he understands that she is not going to recover, that her journey may take a few days but he agrees she appears comfortable and that is what counts. Earlier we met dtr Funmilayo De Leon downstairs, she is very stressed by the pressure she feels to find patient a place to continue on COMFORT CARE/HOSPICE. She feels it is too soon and that patient should be allowed to remain in the hospital until she passes. I explained that the hospital is meant for aggressive care and that was why on the day she was extubated I advised them that if she did not pass quickly, the hospital would be seeking a location to continue to provide comfort/hospice care.  Met with son in law later who had some questions regarding difference between INPATIENT HOSPICE, OUTPATIENT HOSPICE and COMFORT CARE BED, brought Yany Lord into conversation and explained the differences and that she really was not a candidate for in-patient hospice b/c she had no intractable symptoms needing treatment, that if her passing were expected within 72 hours then she would be appropriate for COMFORT CARE, that we were seeking facilities like  to accept her for ongoing comfort care/hospice care, such a nursing home. 2. AMS-yesterday the morphine pca was decreased to 1.5mg/hr and although she still appears quite comfortable, in no distress she has not alerted. 3. Vomiting-explained that with the injury to the brain there is often complaints of head ache and sometime nausea and vomiting. Explained that she was intubated to protect her airway, that it did not appear that she was unable to breath, but was believed to be too altered to keep her airway patent especially when she was having emesis. 4. ICH-family had their questions answered about why she had bleeding into her brain, how it made one side paralyzed and why her recovery was not expected. 5. Dementia-patient has remained quiet, family agreed she appears comfortable but the dtr is hoping she will become more alert if the narcotic is dialed down. 6. Goals of Care and Code Status- dtr agreed to DNR on day of compassionate extubation. She is remains on COMFORT CARE-[place] having trouble accepting that end of life care is not appropriate in the hospital and is resisting discharge to , has asked about transfer to [place], closer to where she lives. I have asked if patient would be a candidate for [facility] in [place], before presenting it to dtr, need to know if this is even an option. Meanwhile it would require transport and how that would be covered and again, if she remains stable until it can be arranged. At time she is to leave [facility], will then need to get the [address]   7. Initial consult note routed to primary continuity provider  8. Communicated plan of care with: Palliative IDT,  Attending, Care Manager, [address] staff. GOALS OF CARE:     [====Goals of Care====]  GOALS OF CARE:  Patient / health care proxy stated goals: to remain comfortable      TREATMENT PREFERENCES:   Code Status: DNR    Advance Care Planning:  Advance Care Planning 5/7/2017   Patient's Healthcare Decision Maker is: Named in scanned ACP document   Primary Decision Maker Name -   Primary Decision Maker Phone Number -   Confirm Advance Directive Yes, not on file       Other:    The palliative care team has discussed with patient / health care proxy about goals of care / treatment preferences for patient.  [====Goals of Care====]    Advance Care Planning 5/7/2017   Patient's Healthcare Decision Maker is: Named in scanned ACP document   Primary Decision Maker Name -   Primary Decision Maker Phone Number -   Confirm Advance Directive Yes, not on file      Stef [phone]     HISTORY:     History obtained from: Chart    CHIEF COMPLAINT: AMS and Vomiting with weakness    HPI/SUBJECTIVE:  97.4. 109,117/73, 12  On 5 L at 96%, wt 86lb. voiding, Colostomy 50ml. MAR-Nebs, (Norvasc, Folvite, Labetalol, Cardene, Protonix), Pericolace, (Levaquin.) Scop Patch, Morphine PCA at 2mg/hr->1  LABS-WBC 18.7->16.5->14.9->17.1, H&H 10.3 & 30.4, Plat 162, INR 1.2, BUN/Creat 11/1.03->17/1.43->18/1.28, Ammonia 32, HgbA1c 6.1, Albumin 3.4, TSH 0.88  Head CT-Large intraparenchymal hemorrhage centered in the left basal ganglia, slightly improved from the prior study, with improved right to left midline shift. Persistent intraventricular extension with improvement in the left  lateral ventricle and increased/new hemorrhage in the right ventricle. A  possible small new areas of subarachnoid hemorrhage. Head CT-There is a large intraparenchymal hemorrhage in the region the left basal  ganglia with mass effect and midline shift to the right by 6 mm.  There is  significant extension of this hemorrhage into the ventricular system. There is  dilatation of the right lateral ventricle. Significant small vessel ischemic  disease seen  CXR-  Adequately positioned support devices.         Neuro Consult-\"Large Left ICH:  Neurosurgery evaluated and did no think there was a surgical role. ICH score suggests 97% mortality. Discussed with her daughter the significant residual disability the patient would be left with even if she survived and could be extubated. 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 The daughter said she spoke with palliative care for 2 hours about all of this as well.     Family is still deciding on plans. Discussed with Dr. Kelsi Figueroa. Getting CT head to evaluate for worsening hydrocephalus\"  Neurosurgery consult-dominant hemisphere large basil ganglia hemorrhage with interventricular extension  PLAN:This is all assuming the setting of dementia and colon cancer. This carries a very poor prognosis for any quality of life. Neurosurgical intervention would not improve the outcome.   Discussions are being had with the power of   The patient is:   [] Verbal and participatory  [x] Non-participatory due to:   unresponsive    Clinical Pain Assessment (nonverbal scale for nonverbal patients):    Resting comfortably-no evidence of pain at our visit       FUNCTIONAL ASSESSMENT:     Palliative Performance Scale (PPS):10%          PSYCHOSOCIAL/SPIRITUAL SCREENING:     Advance Care Planning:  Advance Care Planning 5/7/2017   Patient's Healthcare Decision Maker is: Named in scanned ACP document   Primary Decision Maker Name -   Primary Decision Maker Phone Number -   Confirm Advance Directive Yes, not on file        Any spiritual / Faith concerns:  [] Yes /  [x] No    Caregiver Burnout:  [] Yes /  [x] No /  [] No Caregiver Present      Anticipatory grief assessment:   [x] Normal  / [] Maladaptive       ESAS Anxiety:      ESAS Depression:          REVIEW OF SYSTEMS:     Positive and pertinent negative findings in ROS are noted above in HPI. The following systems were [] reviewed / [x] unable to be reviewed as noted in HPI  Other findings are noted below. Systems: constitutional, ears/nose/mouth/throat, respiratory, gastrointestinal, genitourinary, musculoskeletal, integumentary, neurologic, psychiatric, endocrine. Positive findings noted below. Modified ESAS Completed by: provider                                            PHYSICAL EXAM:     Wt Readings from Last 3 Encounters:   05/14/17 40.4 kg (89 lb 1.1 oz)   05/03/17 41.2 kg (90 lb 12.8 oz)   03/21/17 41.9 kg (92 lb 6.4 oz)     Blood pressure 117/73, pulse (!) 109, temperature 97.4 °F (36.3 °C), resp. rate 12, height 5' 4\" (1.626 m), weight 40.4 kg (89 lb 1.1 oz), SpO2 96 %.   Pain:  Pain Scale 1: Visual  Pain Intensity 1: 0                 Last bowel movement: colostomy 50 ml    Constitutional: breathing is regular, unlabored and strong on nasal cannula. eyes closed at my visit, did not respond to voice/touch or follow commands, face was relaxed, not grimacing         HISTORY:     Active Problems:    Cerebral hemorrhage (HCC) (5/6/2017)      Dementia (5/9/2017)      Vomiting (5/9/2017)      Altered mental status (5/9/2017)      Intraventricular hemorrhage, nontraumatic (HCC) (5/13/2017)      Past Medical History:   Diagnosis Date    ACP (advance care planning) 11/17/2016    Anemia NEC     Colon cancer ([facility] [address] 4/23/2010    Colon polyps     Descending colon; Sigmoid colon X2    Diverticulosis     Gastric polyps     GERD (gastroesophageal reflux disease)     Glaucoma 4/23/2010    Hypertension 4/23/2010    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   Social History   Substance Use Topics    Smoking status: Former Smoker     Packs/day: 0.25     Years: 10.00     Quit date: 1992    Smokeless tobacco: Never Used    Alcohol use No     Allergies   Allergen Reactions    Penicillins Not Reported This Time      Current Facility-Administered Medications   Medication Dose Route Frequency    promethazine (PHENERGAN) suppository 25 mg  25 mg Rectal Q6H PRN    haloperidol (HALDOL) 2 mg/mL oral solution 1-2 mg  1-2 mg SubLINGual Q4H PRN    ondansetron (ZOFRAN ODT) tablet 4 mg  4 mg Oral Q8H PRN    LORazepam (ATIVAN) injection 0.5 mg  0.5 mg IntraVENous Q15MIN PRN    morphine (PF)  mg/30 ml   IntraVENous CONTINUOUS    glycopyrrolate (ROBINUL) injection 0.2 mg  0.2 mg IntraVENous Q6H PRN    scopolamine (TRANSDERM-SCOP) 1.5 mg  1.5 mg TransDERmal Q72H    acetaminophen (TYLENOL) tablet 650 mg  650 mg Oral Q4H PRN    acetaminophen (TYLENOL) suppository 650 mg  650 mg Rectal Q4H PRN    bisacodyl (DULCOLAX) tablet 5 mg  5 mg Oral DAILY PRN    bisacodyl (DULCOLAX) suppository 10 mg  10 mg Rectal DAILY PRN    albuterol (PROVENTIL VENTOLIN) nebulizer solution 2.5 mg  2.5 mg Nebulization Q4H PRN        LAB AND IMAGING FINDINGS:     Lab Results   Component Value Date/Time    WBC 17.1 05/14/2017 04:10 AM    HGB 10.5 05/14/2017 04:10 AM    PLATELET 222 49/65/7566 04:10 AM     Lab Results   Component Value Date/Time    Sodium 151 05/14/2017 04:10 AM    Potassium 3.7 05/14/2017 04:10 AM    Chloride 117 05/14/2017 04:10 AM    CO2 25 05/14/2017 04:10 AM    BUN 18 05/14/2017 04:10 AM    Creatinine 1.28 05/14/2017 04:10 AM    Calcium 9.5 05/14/2017 04:10 AM    Magnesium 2.1 05/14/2017 04:10 AM    Phosphorus 2.9 05/14/2017 04:10 AM      Lab Results   Component Value Date/Time    AST (SGOT) 35 05/12/2017 02:45 AM    Alk. phosphatase 68 05/12/2017 02:45 AM    Protein, total 6.5 05/12/2017 02:45 AM    Albumin 2.7 05/12/2017 02:45 AM    Globulin 3.8 05/12/2017 02:45 AM    GGT 33 12/09/2015 02:06 PM     Lab Results   Component Value Date/Time    INR 1.2 05/14/2017 04:10 AM    Prothrombin time 14.7 05/14/2017 04:10 AM    aPTT 34.2 05/14/2017 04:10 AM      Lab Results   Component Value Date/Time    Iron 29 06/06/2016 11:47 AM    TIBC 217 06/06/2016 11:47 AM    Iron % saturation 13 06/06/2016 11:47 AM    Ferritin 140 05/03/2017 11:45 AM      No results found for: PH, PCO2, PO2  No components found for: [place]   Lab Results   Component Value Date/Time     05/07/2017 05:50 PM    CK - MB 2.9 05/07/2017 05:50 PM              Total time: 40  Counseling / coordination time, spent as noted above: 30  > 50% counseling / coordination?: yes with dtr, son and son in law  Prolonged service was provided for  []30 min   []75 min in face to face time in the presence of the patient, spent as noted above. Time Start: Time End   Note: this can only be billed with 09941 (initial) or 16984 (follow up). If multiple start / stop times, list each separately.

## 2017-05-17 NOTE — PROGRESS NOTES
[facility]  Hospitalist Division    Daily progress Note    Patient: Marshall Watkins MRN: [id number]  CSN: [id number]    YOB: 1930  Age: 80 y.o. Sex: female    DOA: 5/6/2017 LOS:  LOS: 11 days                    Subjective:     CC: ICH     Pt s/e @ bedside  Pt on comfort care  Unresponsive    Objective:      Visit Vitals    BP (!) 148/91 (BP 1 Location: Right arm, BP Patient Position: At rest)    Pulse (!) 123    Temp 98.2 °F (36.8 °C)    Resp 28    Ht 5' 4\" (1.626 m)    Wt 40.4 kg (89 lb 1.1 oz)    SpO2 96%    BMI 15.29 kg/m2       Physical Exam:  General Appearance: Unresponsive, agonal breathing at times  Neck: trachea midline, no JVD  Lungs: CTA, no wheezes, rales or rhonchi appreciated  CV: Tachycardic w/ RR, no m/r/g    Intake and Output:  Current Shift:     Last three shifts:       No results found for this or any previous visit (from the past 24 hour(s)).       Current Facility-Administered Medications:     promethazine (PHENERGAN) suppository 25 mg, 25 mg, Rectal, Q6H PRN, Marcela Pearl MD    haloperidol (HALDOL) 2 mg/mL oral solution 1-2 mg, 1-2 mg, SubLINGual, Q4H PRN, Marcela Pearl MD    ondansetron (ZOFRAN ODT) tablet 4 mg, 4 mg, Oral, Q8H PRN, Marcela Pearl MD    LORazepam (ATIVAN) injection 0.5 mg, 0.5 mg, IntraVENous, Q15MIN PRN, Marcela Pearl MD, 0.5 mg at 05/14/17 2026    morphine (PF)  mg/30 ml, , IntraVENous, CONTINUOUS, Marcela Pearl MD    glycopyrrolate (ROBINUL) injection 0.2 mg, 0.2 mg, IntraVENous, Q6H PRN, Marcela Pearl MD    scopolamine (TRANSDERM-SCOP) 1.5 mg, 1.5 mg, TransDERmal, Q72H, Marcela Pearl MD, 1.5 mg at 05/14/17 1701    acetaminophen (TYLENOL) tablet 650 mg, 650 mg, Oral, Q4H PRN, Jeanne Vela MD, 650 mg at 05/12/17 1242    acetaminophen (TYLENOL) suppository 650 mg, 650 mg, Rectal, Q4H PRN, Jeanne Vela MD    bisacodyl (DULCOLAX) tablet 5 mg, 5 mg, Oral, DAILY PRN, Joy MARINO Yves Cid MD    bisacodyl (DULCOLAX) suppository 10 mg, 10 mg, Rectal, DAILY PRN, Cornelius Turner MD    albuterol (PROVENTIL VENTOLIN) nebulizer solution 2.5 mg, 2.5 mg, Nebulization, Q4H PRN, Cornelius Turner MD, 2.5 mg at 05/14/17 3761    Lab Results   Component Value Date/Time    Glucose 147 05/14/2017 04:10 AM    Glucose 83 05/13/2017 03:45 AM    Glucose 181 05/12/2017 02:45 AM    Glucose 152 05/11/2017 03:40 AM    Glucose 142 05/10/2017 03:40 AM        Assessment/Plan     Active Problems:    Cerebral hemorrhage ([facility] [address] (5/6/2017)      Dementia (5/9/2017)      Vomiting (5/9/2017)      Altered mental status (5/9/2017)      Intraventricular hemorrhage, nontraumatic (HCC) (5/13/2017)       Left basal ganglia ICH with a 6 mm midline shift  Appreciate Neurosurgery and neurology  Cont BP control w/ norvasc, PRN labetalol, goal < 575  Metabolic encephalopathy related to cerebral hemorrhage  VDRF 2/2 ICH  HTN  Hypothyroidism   Hypernatremia   Underweight    Cont supportive care  Plan for d/c [facility] for hospice care      Sanjeev Sexton,   5/17/2017, 4:55 PM

## 2017-05-17 NOTE — PROGRESS NOTES
Patient received in bed with eyes closed. Patient is unresponsive. BI negative with no apparent signs of pain or distress. Son at bedside. Bed locked in low position and call bell w/in reach.

## 2017-05-17 NOTE — PROGRESS NOTES
transport set with [facility] [address] [facility] to [address] [place] [address]. Pcs completed. Notified dtr.

## 2017-05-17 NOTE — PROGRESS NOTES
[facility]: [id number]  hospitalsGARRETT TOMPKINSBarberton [facility]: [phone]   [facility]: [phone]    Patient Name: Phillip Hilliard  YOB: 1930    Date of Initial Consult: 5-8-17  Reason for Consult: Care Decisions  Requesting Provider: Dr Inderjit Ramirez   Primary Care Physician: Rory Martinez., DO      SUMMARY:   Phillip Hilliard is a 80y.o. year old with a past history of colon cancer w/colostomy, hypertension, hypothyroidism, CKD stage 3,  who was admitted on 5/6/2017 from ER/Home with a diagnosis of AMS and  weakness, found to have CVA. Current medical issues leading to Palliative Medicine involvement include: patient was intubated in ER, imaging revealed left sided hemorrhage centered in the basal ganglia with significant interventricular extension and left to right shift, asked to support family to establish goals of care. PALLIATIVE DIAGNOSES:   1. Altered Mental Status  2. Vomiting  3. ICH  4. Dementia       PLAN:   1. Hilda Wei NP and I met with patient, breathing remains quite regular, extremities warm to touch. Her son Nikia Freeman at bedside, he agreed that she did appear to be at peace, he has no concerns. Was told by nursing that with frequent continence care she was grimacing with turning and they had requested a EVANS for comfort-explained to son, he was in agreement, order placed. 2. AMS- morphine pca was decreased to 1.5mg/hr she has not improved in her interactions but continues to be comfortable appearing. 3. Vomiting-explained that with the injury to the brain there is often complaints of head ache and sometime nausea and vomiting. Explained that she was intubated to protect her airway, that it did not appear that she was unable to breath, but was believed to be too altered to keep her airway patent especially when she was having emesis.   4. ICH-family had their questions answered about why she had bleeding into her brain, how it made one side paralyzed and why her recovery was not expected. 5. Dementia-no further dialogue with son nor dtr about her regaining consciousness. 6. Goals of Care and Code Status- dtr agreed to DNR on day of compassionate extubation. Today Teresita Nory LCSW and I met dtr Vera Delgado, she told me that patient was being transported to a [facility] in [address] [facility], that will be easier on her as she lives in [address] [facility]. She signed the [facility], copy to chart, copies to dtr. She remains on COMFORT CARE- have added EVANS, continue PCA of MORPHINE at 1.5mg/hr. Has scop patch. Dtr appears to be adjusting to the situation, less push back now about transferring to a facility for ongoing [address]. Care Manager has transport arranged for 11am 5-18.  7. Initial consult note routed to primary continuity provider  8. Communicated plan of care with: Palliative IDT,  Attending, Care Manager, [address] staff. GOALS OF CARE:     [====Goals of Care====]  GOALS OF CARE:  Patient / health care proxy stated goals: to remain comfortable      TREATMENT PREFERENCES:   Code Status: DNR    Advance Care Planning:  Advance Care Planning 5/7/2017   Patient's Healthcare Decision Maker is: Named in scanned ACP document   Primary Decision Maker Name -   Primary Decision Maker Phone Number -   Confirm Advance Directive Yes, not on file       Other:    The palliative care team has discussed with patient / health care proxy about goals of care / treatment preferences for patient.  [====Goals of Care====]    Advance Care Planning 5/7/2017   Patient's Healthcare Decision Maker is: Named in scanned ACP document   Primary Decision Maker Name -   Primary Decision Maker Phone Number -   Confirm Advance Directive Yes, not on file      Stef [phone]     HISTORY:     History obtained from: Chart    CHIEF COMPLAINT: AMS and Vomiting with weakness    HPI/SUBJECTIVE:  98.2. 123,148/91, 28  On 5 L at 96%, wt 86lb. voiding, Colostomy 50ml.    MAR-Nebs, (Norvasc, Folvite, Labetalol, Cardene, Protonix),(Levaquin.) Scop Patch, Morphine PCA at 2mg/hr->1.5mg/hr  LABS-WBC 18.7->16.5->14.9->17.1, H&H 10.3 & 30.4, Plat 162, INR 1.2, BUN/Creat 11/1.03->17/1.43->18/1.28, Ammonia 32, HgbA1c 6.1, Albumin 3.4, TSH 0.88  Head CT-Large intraparenchymal hemorrhage centered in the left basal ganglia, slightly improved from the prior study, with improved right to left midline shift. Persistent intraventricular extension with improvement in the left  lateral ventricle and increased/new hemorrhage in the right ventricle. A  possible small new areas of subarachnoid hemorrhage. Head CT-There is a large intraparenchymal hemorrhage in the region the left basal  ganglia with mass effect and midline shift to the right by 6 mm. There is  significant extension of this hemorrhage into the ventricular system. There is  dilatation of the right lateral ventricle. Significant small vessel ischemic  disease seen  CXR-  Adequately positioned support devices.         Neuro Consult-\"Large Left ICH:  Neurosurgery evaluated and did no think there was a surgical role. ICH score suggests 97% mortality. Discussed with her daughter the significant residual disability the patient would be left with even if she survived and could be extubated. 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 The daughter said she spoke with palliative care for 2 hours about all of this as well.     Family is still deciding on plans. Discussed with Dr. Marilyn Manriquez. Getting CT head to evaluate for worsening hydrocephalus\"  Neurosurgery consult-dominant hemisphere large basil ganglia hemorrhage with interventricular extension  PLAN:This is all assuming the setting of dementia and colon cancer. This carries a very poor prognosis for any quality of life. Neurosurgical intervention would not improve the outcome. Discussions are being had with the power of   The patient is:   [] Verbal and participatory  [x] Non-participatory due to:   unresponsive    Clinical Pain Assessment (nonverbal scale for nonverbal patients):    Resting comfortably-no evidence of pain at our visit       FUNCTIONAL ASSESSMENT:     Palliative Performance Scale (PPS):10%          PSYCHOSOCIAL/SPIRITUAL SCREENING:     Advance Care Planning:  Advance Care Planning 5/7/2017   Patient's Healthcare Decision Maker is: Named in scanned ACP document   Primary Decision Maker Name -   Primary Decision Maker Phone Number -   Confirm Advance Directive Yes, not on file        Any spiritual / Mu-ism concerns:  [] Yes /  [x] No    Caregiver Burnout:  [] Yes /  [x] No /  [] No Caregiver Present      Anticipatory grief assessment:   [x] Normal  / [] Maladaptive       ESAS Anxiety:      ESAS Depression:          REVIEW OF SYSTEMS:     Positive and pertinent negative findings in ROS are noted above in HPI. The following systems were [] reviewed / [x] unable to be reviewed as noted in HPI  Other findings are noted below. Systems: constitutional, ears/nose/mouth/throat, respiratory, gastrointestinal, genitourinary, musculoskeletal, integumentary, neurologic, psychiatric, endocrine. Positive findings noted below. Modified ESAS Completed by: provider                                            PHYSICAL EXAM:     Wt Readings from Last 3 Encounters:   05/14/17 40.4 kg (89 lb 1.1 oz)   05/03/17 41.2 kg (90 lb 12.8 oz)   03/21/17 41.9 kg (92 lb 6.4 oz)     Blood pressure (!) 148/91, pulse (!) 123, temperature 98.2 °F (36.8 °C), resp. rate 28, height 5' 4\" (1.626 m), weight 40.4 kg (89 lb 1.1 oz), SpO2 96 %.   Pain:  Pain Scale 1: Visual  Pain Intensity 1: 0                 Last bowel movement: colostomy 50 ml    Constitutional: breathing is regular, unlabored and strong on nasal cannula. eyes closed at my visit, did not respond to voice/touch or follow commands, face was relaxed, not grimacing, extremities warm         HISTORY:     Active Problems:    Cerebral hemorrhage ([facility] [address] (5/6/2017)      Dementia (5/9/2017)      Vomiting (5/9/2017)      Altered mental status (5/9/2017)      Intraventricular hemorrhage, nontraumatic (HCC) (5/13/2017)      Past Medical History:   Diagnosis Date    ACP (advance care planning) 11/17/2016    Anemia NEC     Colon cancer ([facility] [address] 4/23/2010    Colon polyps     Descending colon; Sigmoid colon X2    Diverticulosis     Gastric polyps     GERD (gastroesophageal reflux disease)     Glaucoma 4/23/2010    Hypertension 4/23/2010    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   Social History   Substance Use Topics    Smoking status: Former Smoker     Packs/day: 0.25     Years: 10.00     Quit date: 1992    Smokeless tobacco: Never Used    Alcohol use No     Allergies   Allergen Reactions    Penicillins Not Reported This Time      Current Facility-Administered Medications   Medication Dose Route Frequency    promethazine (PHENERGAN) suppository 25 mg  25 mg Rectal Q6H PRN    haloperidol (HALDOL) 2 mg/mL oral solution 1-2 mg  1-2 mg SubLINGual Q4H PRN    ondansetron (ZOFRAN ODT) tablet 4 mg  4 mg Oral Q8H PRN    LORazepam (ATIVAN) injection 0.5 mg  0.5 mg IntraVENous Q15MIN PRN    morphine (PF)  mg/30 ml   IntraVENous CONTINUOUS    glycopyrrolate (ROBINUL) injection 0.2 mg  0.2 mg IntraVENous Q6H PRN    scopolamine (TRANSDERM-SCOP) 1.5 mg  1.5 mg TransDERmal Q72H    acetaminophen (TYLENOL) tablet 650 mg  650 mg Oral Q4H PRN    acetaminophen (TYLENOL) suppository 650 mg  650 mg Rectal Q4H PRN    bisacodyl (DULCOLAX) tablet 5 mg  5 mg Oral DAILY PRN    bisacodyl (DULCOLAX) suppository 10 mg  10 mg Rectal DAILY PRN    albuterol (PROVENTIL VENTOLIN) nebulizer solution 2.5 mg  2.5 mg Nebulization Q4H PRN        LAB AND IMAGING FINDINGS:     Lab Results   Component Value Date/Time    WBC 17.1 05/14/2017 04:10 AM    HGB 10.5 05/14/2017 04:10 AM    PLATELET 381 59/29/7410 04:10 AM     Lab Results   Component Value Date/Time    Sodium 151 05/14/2017 04:10 AM    Potassium 3.7 05/14/2017 04:10 AM    Chloride 117 05/14/2017 04:10 AM    CO2 25 05/14/2017 04:10 AM    BUN 18 05/14/2017 04:10 AM    Creatinine 1.28 05/14/2017 04:10 AM    Calcium 9.5 05/14/2017 04:10 AM    Magnesium 2.1 05/14/2017 04:10 AM    Phosphorus 2.9 05/14/2017 04:10 AM      Lab Results   Component Value Date/Time    AST (SGOT) 35 05/12/2017 02:45 AM    Alk.  phosphatase 68 05/12/2017 02:45 AM    Protein, total 6.5 05/12/2017 02:45 AM    Albumin 2.7 05/12/2017 02:45 AM    Globulin 3.8 05/12/2017 02:45 AM    GGT 33 12/09/2015 02:06 PM     Lab Results   Component Value Date/Time    INR 1.2 05/14/2017 04:10 AM    Prothrombin time 14.7 05/14/2017 04:10 AM    aPTT 34.2 05/14/2017 04:10 AM      Lab Results   Component Value Date/Time    Iron 29 06/06/2016 11:47 AM    TIBC 217 06/06/2016 11:47 AM    Iron % saturation 13 06/06/2016 11:47 AM    Ferritin 140 05/03/2017 11:45 AM      No results found for: PH, PCO2, PO2  No components found for: [place]   Lab Results   Component Value Date/Time     05/07/2017 05:50 PM    CK - MB 2.9 05/07/2017 05:50 PM              Total time: 40  Counseling / coordination time, spent as noted above: 30  > 50% counseling / coordination?: yes with dtr, son and son in law  Prolonged service was provided for  []30 min   []75 min in face to face time in the presence of the patient, spent as noted above.  Time Start: Time End   Note: this can only be billed with 78510 (initial) or 29305 (follow up). If multiple start / stop times, list each separately.

## 2017-05-17 NOTE — PROGRESS NOTES
Called [employer] in [place]. [phone]. [address] fax'd  To . Went to discuss hospice house with  Dtr, not here yet.

## 2017-05-17 NOTE — ROUTINE PROCESS
Bedside and Verbal shift change report given to Asya Cross RN (oncoming nurse) by Vamshi Ward RN, BSN (offgoing nurse). Report given with SBAR, Kardex, Intake/Output, MAR and Recent Results.

## 2017-05-17 NOTE — PROGRESS NOTES
Spoke with  dtr  And her . They  Do  Not  Want  Pt to  Go  to  [address] [place] [address], they  Want pt to  Go  To  [facility]. They can accept tomorrow. dtr spoke with them. [facility], referral to jes beltrán  To  Set up hospice at the facility.

## 2017-05-17 NOTE — PROGRESS NOTES
0520  Pt visually not in distress. Pt with occasional cough. Pt visually not in pain, PCA running without difficulty throughout night. Family stayed at bedside. Bedside and Verbal shift change report given to Mario Ramirez RN by Lorie Carrero RN. Report included the following information SBAR, Kardex, Intake/Output and MAR.

## 2017-05-17 NOTE — PROGRESS NOTES
PCA orders sent to [facility] via [facility] and called to their intake rep, [facility]. # [id number]. She confirmed their nurse will meet the pt in the hospital room [id number] before 1100 to connect to PCA for transport to facility. Notified  [place] rep, [place] # [phone] plan for dc. She confirmed all required documents received;  their nurse will plan start of home hospice once the pt arrives at the nursing facility.

## 2017-05-18 NOTE — PROGRESS NOTES
Care Management Interventions  Transition of Care Consult (CM Consult): [place]: Yes  Current Support Network: Esme discussed with Pt/Family/Caregiver: Yes  Freedom of Choice Offered: Yes  Discharge Location  Discharge Placement: Home with hospice

## 2017-05-18 NOTE — DISCHARGE SUMMARY
Internal Medicine Discharge Summary        Patient: Himanshu Yoder    YOB: 1930    Age:  80 y.o. Admit Date: 5/6/2017    Discharge Date: 5/18/2017    LOS:  LOS: 12 days     Discharge To:  Hospice    Consults: Neurology and Neurosurgery, Palliative Care, Pulmonary/Critical Care    Admission Diagnoses: Cerebral hemorrhage [facility])    Discharge Diagnoses:    Problem List as of 5/18/2017  Date Reviewed: 5/3/2017          Codes Class Noted - Resolved    Intraventricular hemorrhage, nontraumatic ([facility] ICD-10-CM: I61.5  ICD-9-CM: 019  5/13/2017 - Present        Dementia ICD-10-CM: F03.90  ICD-9-CM: 294.20  5/9/2017 - Present        Vomiting ICD-10-CM: R11.10  ICD-9-CM: 787.03  5/9/2017 - Present        Altered mental status ICD-10-CM: R41.82  ICD-9-CM: 780.97  5/9/2017 - Present        Cerebral hemorrhage ([facility] ICD-10-CM: I61.9  ICD-9-CM: 823  5/6/2017 - Present        ACP (advance care planning) ICD-10-CM: Z71.89  ICD-9-CM: V65.49  11/17/2016 - Present        CKD (chronic kidney disease) stage 3, GFR 30-59 ml/min (Chronic) ICD-10-CM: N18.3  ICD-9-CM: 585.3  6/4/2016 - Present        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[facility]) ICD-10-CM: Z93.3  ICD-9-CM: V44.3  12/22/2015 - Present        Paget's bone disease ICD-10-CM: M88.9  ICD-9-CM: 731.0  12/13/2013 - Present        Hypothyroid ICD-10-CM: E03.9  ICD-9-CM: 244.9  6/24/2011 - Present        Anemia ICD-10-CM: D64.9  ICD-9-CM: 285.9  9/13/2010 - Present        Hypertension ICD-10-CM: I10  ICD-9-CM: 401.9  4/23/2010 - Present        Glaucoma ICD-10-CM: H40.9  ICD-9-CM: 365.9  4/23/2010 - Present    Overview Signed 4/23/2010  2:43 PM by Elise Dobbs., DO Dr Waqar Mcgrath             RESOLVED: Moderate protein-calorie malnutrition ([facility] ICD-10-CM: E44.0  ICD-9-CM: 263.0  6/10/2016 - 3/21/2017        RESOLVED: Vitamin D insufficiency ICD-10-CM: E55.9  ICD-9-CM: 268.9  6/10/2016 - 3/21/2017        RESOLVED: Postoperative anemia due to acute blood loss ICD-10-CM: D62  ICD-9-CM: 285.1  6/6/2016 - 6/10/2016        RESOLVED: Hip fracture, right ([facility] ICD-10-CM: S72.001A  ICD-9-CM: 820.8  6/4/2016 - 3/21/2017        RESOLVED: History of rectal cancer ICD-10-CM: Z85.048  ICD-9-CM: V10.06  12/22/2015 - 3/21/2017        RESOLVED: Colostomy stricture ([facility] ICD-10-CM: K94.03  ICD-9-CM: 569.62  12/22/2015 - 3/17/2016        RESOLVED: Malnutrition ([facility] ICD-10-CM: E46  ICD-9-CM: 263.9  1/16/2015 - 1/21/2015        RESOLVED: GI bleed ICD-10-CM: K92.2  ICD-9-CM: 578.9  12/1/2014 - 1/21/2015              Discharge Condition:  Improved    Procedures: None         HPI: The patient is currently intubated  and unable to provide a history. However, her daughter who is with her  is at the bedside. According to the patient's daughter, they were out  walking today when the patient started complaining about pain or  discomfort in her right lower extremity. At that time, the patient started to  go down, the daughter assisted her down and ultimately was able to  assist her into their car. She reports that she had a vomiting episode. She was also drooling on herself and had verbally diminished. She  was able to respond to the daughter's commands; however, was not  fully verbal during the incident. She was brought to the hospital.    Hospital Course (Including Significant Findings): The patient was admitted to the hospital for further management of their presenting condition. Unfortunately, CT scan of the brain showed a large left sided hemorrhage centered in the basal ganglia with significant intraventricular extension and left to right shift. Given her dementia and colon cancer neurosurgical intervention was not recommended as it would not improve the outcome. She remained intubated and showed no improvement during her admission with no interaction. Palliative care was consulted and the family decided on palliative extubation and comfort measures.  She was put on a morphine PCA drip and appears comfortable. Visit Vitals    /71 (BP 1 Location: Right arm, BP Patient Position: At rest)    Pulse 98    Temp 98 °F (36.7 °C)    Resp 19    Ht 5' 4\" (1.626 m)    Wt 40.4 kg (89 lb 1.1 oz)    SpO2 97%    BMI 15.29 kg/m2       Physical Exam at Discharge:  General Appearance: cachetic, agnonal  HENT: normocephalic/atraumatic, dry mucus membranes  Lungs: CTA with agonal breaths at times  CV: RRR, no m/r/g  Abdomen: soft, non-tender, normal bowel sounds  Extremities: no cyanosis, no peripheral edema  Neuro: R-sided paralysis, corneal intact  Psych: unable to fully assess    Labs Prior to Discharge:  Labs: Results:       Chemistry No results for input(s): GLU, NA, K, CL, CO2, BUN, CREA, CA, AGAP, BUCR, TBIL, GPT, AP, TP, ALB, GLOB, AGRAT in the last 72 hours. CBC w/Diff No results for input(s): WBC, RBC, HGB, HCT, PLT, GRANS, LYMPH, EOS, HGBEXT, HCTEXT, PLTEXT in the last 72 hours. Cardiac Enzymes No results for input(s): CPK, CKND1, GAYLE in the last 72 hours. No lab exists for component: CKRMB, TROIP   Coagulation No results for input(s): PTP, INR, APTT in the last 72 hours. No lab exists for component: INREXT    Lipid Panel Lab Results   Component Value Date/Time    Cholesterol, total 203 05/06/2017 07:00 PM    HDL Cholesterol 75 05/06/2017 07:00 PM    LDL, calculated 99.4 05/06/2017 07:00 PM    VLDL, calculated 28.6 05/06/2017 07:00 PM    Triglyceride 143 05/06/2017 07:00 PM    CHOL/HDL Ratio 2.7 05/06/2017 07:00 PM      BNP No results for input(s): BNPP in the last 72 hours. Liver Enzymes No results for input(s): TP, ALB, TBIL, AP, SGOT, GPT in the last 72 hours. No lab exists for component: DBIL   Thyroid Studies Lab Results   Component Value Date/Time    TSH 0.888 05/03/2017 11:45 AM            Significant Imaging:  Ct Head Wo Cont    Result Date: 5/10/2017  EXAM: CT head INDICATION: Intracranial hemorrhage. COMPARISON: May 6, 2017.  TECHNIQUE: Axial CT imaging of the head was performed without intravenous contrast. Images were reconstructed in the coronal and sagittal planes. One or more dose reduction techniques were used on this CT: automated exposure control, adjustment of the mAs and/or kVp according to patient's size, and iterative reconstruction techniques. The specific techniques utilized on this CT exam have been documented in the patient's electronic medical record. Note: Preliminary report sent to the Emergency Department by the radiology resident at the time of the study. _______________ FINDINGS: BRAIN AND POSTERIOR FOSSA/EXTRA-AXIAL SPACES AND MENINGES: Again identified is a large intraparenchymal hemorrhage centered within the left basal ganglia measuring approximately 4.3 x 3.1 x 4.6 cm previously 4.7 x 4.5 x 4 cm. There is persistent left to right midline shift now measuring 6.9 mm compared to 10.4 mm using the same measuring technique. Again identified is significant hemorrhage within the ventricles. There is layering hemorrhage in the lateral ventricles, slightly improved in the left lateral ventricle and increased/new in the right lateral ventricle. There is persistent dilatation of the lateral ventricles and third ventricle. The fourth ventricle appears normal. There appears be a new area of subarachnoid hemorrhage along the left parietal lobe. Possible area of subarachnoid hemorrhage along the right temporal lobe. There is a background of periventricular hypodensity, nonspecific but likely chronic microvascular ischemic change. There is overall mass effect with the effacement of the sulci superiorly. CALVARIUM: Intact. There is osteopenia. SINUSES: Clear. OTHER: None. _______________     IMPRESSION: 1. Large intraparenchymal hemorrhage centered in the left basal ganglia, slightly improved from the prior study, with improved right to left midline shift.  Persistent intraventricular extension with improvement in the left lateral ventricle and increased/new hemorrhage in the right ventricle. A possible small new areas of subarachnoid hemorrhage. Ct Head Wo Cont    Result Date: 5/6/2017  EXAM: CT head INDICATION: CODE S COMPARISON: None. TECHNIQUE: Axial CT imaging of the head was performed without intravenous contrast. DOSE REDUCTION:  One or more dose reduction techniques were used on this CT: automated exposure control, adjustment of the mAs and/or kVp according to patient's size, and iterative reconstruction techniques. The specific techniques utilized on this CT exam have been documented in the patient's electronic medical record. _______________ FINDINGS: BRAIN AND POSTERIOR FOSSA: There is age-appropriate atrophy. There is a large intraparenchymal hemorrhage in the left basal ganglia region measuring 4.7 x 4.5 x 4 cm with significant extension of this hemorrhage into the ventricles. There is midline shift to the right by a proximally 6 mm. There is dilatation the right lateral ventricle. Significant small vessel ischemic disease. EXTRA-AXIAL SPACES AND MENINGES: There are no abnormal extra-axial fluid collections. CALVARIUM: Intact. SINUSES: Clear. OTHER: None. _______________     IMPRESSION: There is a large intraparenchymal hemorrhage in the region the left basal ganglia with mass effect and midline shift to the right by 6 mm. There is significant extension of this hemorrhage into the ventricular system. There is dilatation of the right lateral ventricle. Significant small vessel ischemic disease seen. Dr. Luis Steiner informed 7:53 PM May 6, 2017    Xr Chest Port    Result Date: 5/14/2017  Indication:  Endotracheal tube placement Comparison:  05/13/17 Time of study - 939 Findings:  AP semierect portable chest The heart size is normal.  The endotracheal tube is again noted in the upper thoracic trachea approximately 7.5 cm above the esdras. Again, this catheter should be advanced about 2 cm for more optimal positioning. The lungs are stable and clear.  The enteric catheter tip is in the stomach. IMPRESSION: Endotracheal tube tip again evident in the upper thoracic trachea. This catheter should be advanced about 2 cm for more optimal positioning. Xr Chest Port    Result Date: 5/13/2017  Indication:  Endotracheal tube placement Comparison:  05/10/17 Time of study - 1054 Findings:  AP semierect portable chest The cardiomediastinal silhouette is normal.  The NG tube tip is in the stomach. The endotracheal catheter tip is in the upper thoracic trachea. The esdras is not well identified. This catheter should be advanced about 2.5 cm for more optimal positioning. The lungs are clear. No pneumothorax is evident. IMPRESSION: Endotracheal catheter tip in the upper thoracic trachea. This catheter should be advanced about 2.5 cm for more optimal positioning. Xr Chest Port    Result Date: 5/10/2017  EXAM: Chest portable INDICATION: Intracerebral hemorrhage COMPARISON: Single view chest 5/7/2017 _______________ FINDINGS: AP portable chest film was performed. Endotracheal tube continues be present in good position well above the esdras. NG tube is present with the tip extending below the diaphragm although tip is not included on this exam. Lungs are clear. No effusion or pneumothorax. Heart and pulmonary vascularity are normal. _______________     IMPRESSION: 1. Lines and tubes in good position. 2. No acute cardiopulmonary disease. Xr Chest Port    Result Date: 5/7/2017  Frontal chest radiograph Comparison: 5/6/2017 Indication: Intracerebral hemorrhage. Intubation. Findings: Adequately positioned endotracheal tube. Nasogastric tube extends into the stomach. Stable cardiomediastinal silhouette. Pleural spaces appear clear. No abnormal pulmonary opacities. No acute osseous abnormality. IMPRESSION: Adequately positioned support devices. Xr Chest Port    Result Date: 5/7/2017  Frontal chest radiograph Comparison: 5/3/2014 Indication: Altered mental status. Intubation. Findings: Endotracheal tube adequately positioned. Normal heart size. Relatively tortuous thoracic aorta. Pleural spaces appear clear. No suspicious pulmonary opacities. No acute osseous abnormality. IMPRESSION: Adequate positioning of endotracheal tube. Discharge Medications:     Current Discharge Medication List      STOP taking these medications       enalapril (VASOTEC) 5 mg tablet Comments:   Reason for Stopping:         levothyroxine (SYNTHROID) 75 mcg tablet Comments:   Reason for Stopping:         brimonidine-timolol (COMBIGAN) 0.2-0.5 % drop ophthalmic solution Comments:   Reason for Stopping:         calcium-vitamin D (OYSTER SHELL) 500 mg(1,250mg) -200 unit per tablet Comments:   Reason for Stopping:         latanoprost (XALATAN) 0.005 % ophthalmic solution Comments:   Reason for Stopping:               Activity: Activity as tolerated    Diet: Comfort feeding    Wound Care: None needed    Follow-up:   Please follow up with your PCP within 7 days to discuss your recent hospitalization. Patient to arrange.          Total time spent including time spent on final examination and discharge discussion, discharge documentation and records reviewed and medication reconciliation: > 30 minutes    Vern Mercer DO  Internal Medicine, Hospitalist  Pager: [phone]

## 2017-05-18 NOTE — PROGRESS NOTES
Pt to transfer to [facility] in [place] via [facility]  Transport at 11 am  Today. [facility] hospice will be here to  [facility] pt up  To pca that will be transported with her. transport was set up yesterday, notified  Them of pca pump.notified dtr and her , that we could not guarantee payment of all or any  Of transport  From  Her insurance. Pcs completed. Confirmed with [facility] this am They have trip  For today , aware of pump and O2 needs. envelope in nurses station.

## 2017-05-18 NOTE — DISCHARGE INSTRUCTIONS
DISCHARGE SUMMARY from Nurse    The following personal items are in your possession at time of discharge:    Dental Appliances: None  Visual Aid: None     Home Medications: None  Jewelry: None  Clothing: Hat  Other Valuables: None             PATIENT INSTRUCTIONS:    After general anesthesia or intravenous sedation, for 24 hours or while taking prescription Narcotics:  · Limit your activities  · Do not drive and operate hazardous machinery  · Do not make important personal or business decisions  · Do  not drink alcoholic beverages  · If you have not urinated within 8 hours after discharge, please contact your surgeon on call. Report the following to your surgeon:  · Excessive pain, swelling, redness or odor of or around the surgical area  · Temperature over 100.5  · Nausea and vomiting lasting longer than 4 hours or if unable to take medications  · Any signs of decreased circulation or nerve impairment to extremity: change in color, persistent  numbness, tingling, coldness or increase pain  · Any questions        What to do at Home:  Recommended activity: as prescribed by physician    If you experience any of the following symptoms as listed in discharge teaching as \"when to call for help\" please follow up with your primary care physician and/or call 911. .      *  Please give a list of your current medications to your Primary Care Provider. *  Please update this list whenever your medications are discontinued, doses are      changed, or new medications (including over-the-counter products) are added. *  Please carry medication information at all times in case of emergency situations. These are general instructions for a healthy lifestyle:    No smoking/ No tobacco products/ Avoid exposure to second hand smoke    Surgeon General's Warning:  Quitting smoking now greatly reduces serious risk to your health.     Obesity, smoking, and sedentary lifestyle greatly increases your risk for illness    A healthy diet, regular physical exercise & weight monitoring are important for maintaining a healthy lifestyle    You may be retaining fluid if you have a history of heart failure or if you experience any of the following symptoms:  Weight gain of 3 pounds or more overnight or 5 pounds in a week, increased swelling in our hands or feet or shortness of breath while lying flat in bed. Please call your doctor as soon as you notice any of these symptoms; do not wait until your next office visit. Recognize signs and symptoms of STROKE:    F-face looks uneven    A-arms unable to move or move unevenly    S-speech slurred or non-existent    T-time-call 911 as soon as signs and symptoms begin-DO NOT go       Back to bed or wait to see if you get better-TIME IS BRAIN. Warning Signs of HEART ATTACK     Call 911 if you have these symptoms:   Chest discomfort. Most heart attacks involve discomfort in the center of the chest that lasts more than a few minutes, or that goes away and comes back. It can feel like uncomfortable pressure, squeezing, fullness, or pain.  Discomfort in other areas of the upper body. Symptoms can include pain or discomfort in one or both arms, the back, neck, jaw, or stomach.  Shortness of breath with or without chest discomfort.  Other signs may include breaking out in a cold sweat, nausea, or lightheadedness. Don't wait more than five minutes to call 911 - MINUTES MATTER! Fast action can save your life. Calling 911 is almost always the fastest way to get lifesaving treatment. Emergency Medical Services staff can begin treatment when they arrive -- up to an hour sooner than if someone gets to the hospital by car. The discharge information has been reviewed with the patient. The patient verbalized understanding. Discharge medications reviewed with the patient and caregiver and appropriate educational materials and side effects teaching were provided.         Learning About a Hemorrhagic Stroke  What is a hemorrhagic stroke? When you have a hemorrhagic (say \"naz-atk-ZM-lita\") stroke, it means that a blood vessel in the brain has burst open or has started to leak. When the blood spills into the space inside and around the brain, it damages nearby nerve cells. This is different from an ischemic (say \"nys-YIP-exgf\") stroke, which happens when a blood clot blocks a blood vessel in the brain. The brain damage from a stroke starts within minutes. Quick treatment can help limit damage to the brain and make recovery more likely. People who have had a stroke may have a hard time talking, understanding things, and making decisions. They may have to relearn daily activities, such as how to eat, bathe, and dress. How well someone recovers from a stroke depends on how quickly the person gets to the hospital, where in the brain the stroke happened, and how severe it was. Stroke rehabilitation, which includes training and therapy, also helps people recover. What are the symptoms? If you have any of these symptoms, call 911 or other emergency services right away. · You have symptoms of a stroke. These may include:  ¨ Sudden numbness, tingling, weakness, or loss of movement in your face, arm, or leg, especially on only one side of your body. ¨ Sudden vision changes. ¨ Sudden trouble speaking. ¨ Sudden confusion or trouble understanding simple statements. ¨ Sudden problems with walking or balance. ¨ A sudden, severe headache that is different from past headaches. See your doctor if you have symptoms that seem like a stroke, even if they go away quickly. You may have had a transient ischemic attack (TIA), sometimes called a mini-stroke. A TIA is a warning that a stroke may happen soon. Getting early treatment for a TIA can help prevent a stroke. What causes a hemorrhagic stroke? A hemorrhagic stroke happens to blood vessels that have been weakened.  The most common causes of weakened blood vessels in the brain are:  · A brain aneurysm. This is a bulging, weak part of a blood vessel. It can put pressure on nerves, or it can bleed or break open (rupture). · A brain AVM. This is an abnormal knot of weak blood vessels that some people are born with. · Head injury. · Chronic uncontrolled high blood pressure. Blood pressure that is too high over the long term increases your risk for stroke. · Very high blood pressure. Very high blood pressure that comes on suddenly is dangerous. It is a medical emergency. Anticoagulant and antiplatelet medicines can also cause bleeding in the brain. These medicines, also called blood thinners, increase the time it takes for a blood clot to form. How is hemorrhagic stroke treated? Emergency treatment is done to stop the bleeding and prevent damage to the brain. · You may need surgery to repair an aneurysm or to remove the blood that has built up inside the brain. · You may be given medicine to stop the bleeding. · You will be closely watched for signs of increased pressure on the brain. These signs include restlessness, confusion, trouble following commands, and headache. · You may take medicine to manage high blood pressure. Ask your doctor if a stroke rehab program is right for you. Rehab increases your chances of getting back some of the abilities you lost.  Follow-up care is a key part of your treatment and safety. Be sure to make and go to all appointments, and call your doctor if you are having problems. It's also a good idea to know your test results and keep a list of the medicines you take. How can you prevent another stroke? · Work with your doctor to treat health problems, such as high blood pressure, that raise your chances of having another stroke. · Be safe with medicines. Take your medicine exactly as prescribed. Call your doctor if you think you are having a problem with your medicine. · Have a healthy lifestyle.   ¨ Do not smoke or allow others to smoke around you. If you need help quitting, talk to your doctor about stop-smoking programs and medicines. These can increase your chances of quitting for good. Smoking makes a stroke more likely. ¨ Limit alcohol to 2 drinks a day for men and 1 drink a day for women. ¨ Be active. Ask your doctor what type and level of activity is safe for you. ¨ Eat heart-healthy foods, like fruits, vegetables, and high-fiber foods. ¨ Stay at a healthy weight. Lose weight if you need to. Where can you learn more? Go to [employer]  Enter R416 in the search box to learn more about \"Learning About a Hemorrhagic Stroke. \"   © 1806-2136 [employer]. Care instructions adapted under license by Suj[employer] Nolan (which disclaims liability or warranty for this information). This care instruction is for use with your licensed healthcare professional. If you have questions about a medical condition or this instruction, always ask your healthcare professional. [place] [address] any warranty or liability for your use of this information.   Content Version: 12.7.948577; Current as of: August 28, 2015

## 2017-05-18 NOTE — PROGRESS NOTES
0706 Bedside  shift report given to KASEY Sprague. Family at bedside. No acute distress noted.  Remains on Morphine PCA at 1.5 mg.

## 2017-05-18 NOTE — PROGRESS NOTES
Patient received in bed asleep. Patient alert, not oriented and non-verbal, behavior indicators negative for pain and discomfort. Patient resting quietly. Frequent use items within reach. Bed locked in low position. Call bell within reach. Family members at bedside. 7749:  PCA pump stopped for the Hospice nurse to place SUBQ PCA pump for transport. Verified with Hospice nurse that the pump was stopped before administration of SUBQ pump. Gave Hospice nurse Pt most recent vital signs and informed her Pt condition and history. 9090: Wasted remaining PCA with KASEY Moreno.      [id number]: Went over Pt discharge instructions with family members at bedside. Family members voiced acknowledgement and had no questions regarding discharge instructions. 1140:  Pt discharged to [facility], [facility] by [facility] technicians via stretcher; daughter has arrived and called floor, awaiting car for patient. PT has all discharge instructions and belongings. Family voiced understanding of all discharge instructions.

## 2017-05-18 NOTE — PROGRESS NOTES
Hospital to SNF SBAR Handoff - Genette Riff                                                                        80 y.o.   female    [address]   Room: 2104/01    [facility] 2W NEURO MED  Unit Phone# :  [phone]     [facility] 2W NEURO MED  [address] [address]  Dept: [phone]  Loc: [phone]                    SITUATION     Admitted:  5/6/2017         Attending Provider:  No att. providers found       Consultations:  [address] TO INTENSIVIST  IP CONSULT TO NEUROLOGY  IP CONSULT TO PALLIATIVE CARE - PROVIDER  IP CONSULT TO NEUROSURGERY    PCP:  Nicolasa Dahl, DO   [phone]    Treatment Team: Consulting Provider: Aj Jaimes MD; Care Manager: Ban Stacy; Occupational Therapist: Gurinder Mcdonough OT; Physical Therapist: Nilsa Watkins PT; Consulting Provider: Toribio Starr MD; Charge Nurse: Delma Zaman RN    Admitting Dx:  Cerebral hemorrhage [facility])       Principal Problem: <principal problem not specified>    * No surgery found * of      BY: * Surgery not found *             ON: * No surgery found *                  Code Status: DNR                Advance Directives:   Advance Care Planning 5/7/2017   Patient's Healthcare Decision Maker is: Named in scanned ACP document   Primary Decision Maker Name -   Primary Decision Maker Phone Number -   Confirm Advance Directive Yes, not on file    (Send w/patient)   Not Received       Isolation:  There are currently no Active Isolations       MDRO: MRSA    Pain Medications given:  Morphine PCA    Last dose: Removed from hospital PCA at [address] placed on SUBQ PCA via hospice nurse at [address] needed: yes  Type of equipment: SUBQ PCA pump         BACKGROUND     Allergies:   Allergies   Allergen Reactions    Penicillins Not Reported This Time       Past Medical History:   Diagnosis Date    ACP (advance care planning) 11/17/2016    Anemia NEC     Colon cancer ([facility] [address] 4/23/2010    Colon polyps     Descending colon; Sigmoid colon X2    Diverticulosis     Gastric polyps     GERD (gastroesophageal reflux disease)     Glaucoma 4/23/2010    Hypertension 4/23/2010    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 Hard scripts included in transfer packet yes    Vaccinations:    Immunization History   Administered Date(s) Administered    Influenza High Dose Vaccine PF 12/17/2015    Influenza Vaccine (Madin Emy Canine Kidney) PF 12/02/2014    Influenza Vaccine Split 11/07/2011, 12/04/2012    Pneumococcal Conjugate (PCV-13) 12/17/2015    Pneumococcal Vaccine (Unspecified Type) 06/07/2011    TDAP Vaccine 06/07/2011       Readmission Risks:    Known Risks: Unknown        The Charlson CoMorbitiy Index tool is an evidenced based tool that has more automatic generated information. The tool looks at many different items such as the age of the patient, how many times they were admitted in the last calendar year, current length of stay in the hospital and their diagnosis. All of these items are pulled automatically from information documented in the chart from various places and will generate a score that predicts whether a patient is at low (less than 13), medium (13-20) or high (21 or greater) risk of being readmitted.         ASSESSMENT                Temp: 98 °F (36.7 °C) (05/18/17 0610) Pulse (Heart Rate): 98 (05/18/17 0610)     Resp Rate: 19 (05/18/17 0610)           BP: 107/71 (05/18/17 0610)     O2 Sat (%): 97 % (05/18/17 0610)     Weight: 40.4 kg (89 lb 1.1 oz)    Height: 5' 4\" (162.6 cm) (05/06/17 1858)       If above not within 1 hour of discharge:    BP:_____ P:____  R:____ T:_____ O2 Sat: ___%  O2: ______    Active Orders   Diet    DIET NPO With Meds         Orientation: disoriented     Active Behaviors: None                                   Active Lines/Drains:  (Peg Tube / Khanna / CL or S/L?): yes Pt has a SUBQ PCA    Urinary Status: Khanna     Last BM: Last Bowel Movement Date:  (coloscopy)     Skin Integrity: Wound (add Wound LDA)   Wound Sacral/coccyx Posterior-DRESSING STATUS: Clean, dry, and intact    Wound Sacral/coccyx Posterior-DRESSING TYPE: Foam    Mobility: Completely immobile   Weight Bearing Status: NWB (Non Weight Bearing)                Lab Results   Component Value Date/Time    Glucose 147 05/14/2017 04:10 AM    Hemoglobin A1c 6.1 05/06/2017 07:00 PM    INR 1.2 05/14/2017 04:10 AM    INR 1.2 05/13/2017 03:45 AM    HGB 10.5 05/14/2017 04:10 AM    HGB 10.4 05/13/2017 03:45 AM        RECOMMENDATION     See After Visit Summary (AVS) for:  · Discharge instructions  · After [address]   · Special equipment needed (entered pre-discharge by Care Management)  · Medication Reconciliation    · Follow up Appointment(s)         Report given/sent by:  Carissa Bear RN                    Verbal report given to: Chris Johnson LPN  FAXED to:  [phone]       Estimated discharge time:  5/18/2017 at 1140

## 2017-05-22 ENCOUNTER — HOME CARE VISIT (OUTPATIENT)
Dept: HOSPICE | Facility: HOSPICE | Age: 82
End: 2017-05-22
Payer: MEDICARE

## 2018-05-21 NOTE — ED TRIAGE NOTES
Pt pulled out of car, reported right side weakness with new onset. Pt actively projectiile vomitting.  md immediately at bedside Rx for ketarolac sent -set up Ortho consult

## 2019-01-26 NOTE — PROGRESS NOTES
[facility]  Hospitalist Division    Daily progress Note    Patient: Petrina Koyanagi MRN: [id number]  CSN: [id number]    YOB: 1930  Age: 80 y.o.   Sex: female    DOA: 5/6/2017 LOS:  LOS: 6 days                    Subjective:     CC: ICH     Pt s/e @ bedside  No changes from exam yesterday  No major events overnight    Objective:      Visit Vitals    BP (!) 135/113    Pulse 97    Temp 99.8 °F (37.7 °C)    Resp 17    Ht 5' 4\" (1.626 m)    Wt 43.2 kg (95 lb 3.8 oz)    SpO2 100%    BMI 16.35 kg/m2       Physical Exam:  General Appearance: intubated and sedated  HENT: normocephalic/atraumatic, + ETT  Neck: trachea midline, no JVD  Lungs: vent-assisted breath sounds, no wheezes, rales or rhonchi appreciated  CV: RRR, no m/r/g  Abdomen: soft, non-tender, normal bowel sounds  Genitourinary: + singh, no hematuria noted  Extremities: no cyanosis, no peripheral edema  Neuro: sedated, R-sided paralysis, responds to verbal/tactile stim, + corneal reflex      Intake and Output:  Current Shift:  05/12 0701 - 05/12 1900  In: 224.6 [I.V.:104.6]  Out: -   Last three shifts:  05/10 1901 - 05/12 0700  In: 2928.6 [I.V.:2108.6]  Out: 1151 [Urine:901]    Recent Results (from the past 24 hour(s))   GLUCOSE, POC    Collection Time: 05/11/17 11:13 AM   Result Value Ref Range    Glucose (POC) 200 (H) 70 - 110 mg/dL   POTASSIUM    Collection Time: 05/11/17  3:04 PM   Result Value Ref Range    Potassium 4.2 3.5 - 5.5 mmol/L   PHOSPHORUS    Collection Time: 05/11/17  3:04 PM   Result Value Ref Range    Phosphorus 2.7 2.5 - 4.9 MG/DL   GLUCOSE, POC    Collection Time: 05/11/17  5:00 PM   Result Value Ref Range    Glucose (POC) 154 (H) 70 - 110 mg/dL   GLUCOSE, POC    Collection Time: 05/11/17 11:21 PM   Result Value Ref Range    Glucose (POC) 241 (H) 70 - 110 mg/dL   AMYLASE    Collection Time: 05/12/17  2:45 AM   Result Value Ref Range    Amylase 217 (H) 25 - 115 U/L   HEPATIC FUNCTION PANEL    Collection Time: 05/12/17  2:45 AM   Result Value Ref Range    Protein, total 6.5 6.4 - 8.2 g/dL    Albumin 2.7 (L) 3.4 - 5.0 g/dL    Globulin 3.8 2.0 - 4.0 g/dL    A-G Ratio 0.7 (L) 0.8 - 1.7      Bilirubin, total 0.4 0.2 - 1.0 MG/DL    Bilirubin, direct 0.1 0.0 - 0.2 MG/DL    Alk.  phosphatase 68 45 - 117 U/L    AST (SGOT) 35 15 - 37 U/L    ALT (SGPT) 32 13 - 56 U/L   LIPASE    Collection Time: 05/12/17  2:45 AM   Result Value Ref Range    Lipase 387 73 - 393 U/L   MAGNESIUM    Collection Time: 05/12/17  2:45 AM   Result Value Ref Range    Magnesium 2.2 1.6 - 2.6 mg/dL   PHOSPHORUS    Collection Time: 05/12/17  2:45 AM   Result Value Ref Range    Phosphorus 2.5 2.5 - 4.9 MG/DL   PROTHROMBIN TIME + INR    Collection Time: 05/12/17  2:45 AM   Result Value Ref Range    Prothrombin time 15.2 11.5 - 15.2 sec    INR 1.3 (H) 0.8 - 1.2     PTT    Collection Time: 05/12/17  2:45 AM   Result Value Ref Range    aPTT 27.5 23.0 - 44.7 SEC   METABOLIC PANEL, BASIC    Collection Time: 05/12/17  2:45 AM   Result Value Ref Range    Sodium 157 (H) 136 - 145 mmol/L    Potassium 4.8 3.5 - 5.5 mmol/L    Chloride 125 (H) 100 - 108 mmol/L    CO2 22 21 - 32 mmol/L    Anion gap 10 3.0 - 18 mmol/L    Glucose 181 (H) 74 - 99 mg/dL    BUN 18 7.0 - 18 MG/DL    Creatinine 1.48 (H) 0.6 - 1.3 MG/DL    BUN/Creatinine ratio 12 12 - 20      GFR est AA 41 (L) >60 ml/min/1.73m2    GFR est non-AA 33 (L) >60 ml/min/1.73m2    Calcium 9.3 8.5 - 10.1 MG/DL   CBC WITH AUTOMATED DIFF    Collection Time: 05/12/17  4:00 AM   Result Value Ref Range    WBC 14.7 (H) 4.6 - 13.2 K/uL    RBC 3.64 (L) 4.20 - 5.30 M/uL    HGB 10.9 (L) 12.0 - 16.0 g/dL    HCT 32.7 (L) 35.0 - 45.0 %    MCV 89.8 74.0 - 97.0 FL    MCH 29.9 24.0 - 34.0 PG    MCHC 33.3 31.0 - 37.0 g/dL    RDW 14.9 (H) 11.6 - 14.5 %    PLATELET 764 920 - 946 K/uL    MPV 10.8 9.2 - 11.8 FL    NEUTROPHILS 86 (H) 40 - 73 %    LYMPHOCYTES 5 (L) 21 - 52 %    MONOCYTES 9 3 - 10 %    EOSINOPHILS 0 0 - 5 % BASOPHILS 0 0 - 2 %    ABS. NEUTROPHILS 12.7 (H) 1.8 - 8.0 K/UL    ABS. LYMPHOCYTES 0.8 (L) 0.9 - 3.6 K/UL    ABS. MONOCYTES 1.3 (H) 0.05 - 1.2 K/UL    ABS. EOSINOPHILS 0.0 0.0 - 0.4 K/UL    ABS.  BASOPHILS 0.0 0.0 - 0.06 K/UL    DF AUTOMATED     AMMONIA    Collection Time: 05/12/17  5:30 AM   Result Value Ref Range    Ammonia 24 11 - 32 UMOL/L   CALCIUM, IONIZED    Collection Time: 05/12/17  5:30 AM   Result Value Ref Range    Ionized Calcium 1.25 1.12 - 1.32 MMOL/L   GLUCOSE, POC    Collection Time: 05/12/17  5:54 AM   Result Value Ref Range    Glucose (POC) 150 (H) 70 - 110 mg/dL         Current Facility-Administered Medications:     dextrose 5% and 0.9% NaCl infusion, 37 mL/hr, IntraVENous, CONTINUOUS, Martin Jaime MD, Last Rate: 37.4 mL/hr at 05/12/17 0512, 37 mL/hr at 05/12/17 0512    niCARdipine (CARDENE) 25 mg in 0.9% sodium chloride 250 mL infusion, 0-15 mg/hr, IntraVENous, TITRATE, Linnea Ramsay, [address], Stopped at 05/12/17 0831    labetalol (NORMODYNE;TRANDATE) 20 mg/4 mL (5 mg/mL) injection 10 mg, 10 mg, IntraVENous, Q4H PRN, Linnea Ramsay, PA, 10 mg at 05/12/17 0332    amLODIPine (NORVASC) tablet 10 mg, 10 mg, Oral, DAILY, Laura Ramsay MD, 10 mg at 05/12/17 0847    insulin lispro (HUMALOG) injection, , SubCUTAneous, Q6H, Aneta Mooney MD, Stopped at 05/12/17 0600    glucose chewable tablet 16 g, 4 Tab, Oral, PRN, Aneta Mooney MD    glucagon (GLUCAGEN) injection 1 mg, 1 mg, IntraMUSCular, PRN, Aneta Mooney MD    dextrose (D50W) injection syrg 12.5-25 g, 25-50 mL, IntraVENous, PRN, Aneta Mooney MD    fentaNYL citrate (PF) injection 50 mcg, 50 mcg, IntraVENous, Q1H PRN, Benedicto Norris MD, 50 mcg at 05/06/17 2038    ELECTROLYTE REPLACEMENT PROTOCOL, 1 Each, Other, Richard Summers MD, 1 Each at 05/12/17 0600    ELECTROLYTE REPLACEMENT PROTOCOL, 1 Each, Other, Richard Summers MD, 1 Each at 05/12/17 0600    ELECTROLYTE REPLACEMENT PROTOCOL, 1 Each, Other, Richard Summers MD, 1 Each at 05/12/17 0600    PHARMACY INFORMATION NOTE, 1 Each, Other, Belén Good MD, 1 Each at 05/12/17 0600    chlorhexidine (PERIDEX) 0.12 % mouthwash 15 mL, 15 mL, Oral, Q12H, Aneta Mooney MD, 15 mL at 05/12/17 0847    ondansetron (ZOFRAN) injection 1 mg, 1 mg, IntraVENous, Q6H PRN, Aneta Mooney MD    acetaminophen (TYLENOL) tablet 650 mg, 650 mg, Oral, Q4H PRN, Aneta Mooney MD, 650 mg at 05/11/17 1735    acetaminophen (TYLENOL) suppository 650 mg, 650 mg, Rectal, Q4H PRN, Aneta Mooney MD    senna-docusate (PERICOLACE) 8.6-50 mg per tablet 2 Tab, 2 Tab, Oral, QHS, Aneta Mooney MD, 2 Tab at 05/11/17 2121    bisacodyl (DULCOLAX) tablet 5 mg, 5 mg, Oral, DAILY PRN, Aneta Mooney MD    bisacodyl (DULCOLAX) suppository 10 mg, 10 mg, Rectal, DAILY PRN, Aneta Mooney MD    pantoprazole (PROTONIX) 40 mg in sodium chloride 0.9 % 10 mL injection, 40 mg, IntraVENous, Q12H, Aneta Mooney MD, 40 mg at 05/12/17 0847    albuterol (PROVENTIL VENTOLIN) nebulizer solution 2.5 mg, 2.5 mg, Nebulization, Q4H PRN, Aneta Mooney MD    folic acid (FOLVITE) 1 mg, thiamine (B-1) 100 mg in 0.9% sodium chloride 50 mL ivpb, , IntraVENous, DAILY, Aneta Mooney MD    Lab Results   Component Value Date/Time    Glucose 181 05/12/2017 02:45 AM    Glucose 152 05/11/2017 03:40 AM    Glucose 142 05/10/2017 03:40 AM    Glucose 144 05/09/2017 03:35 AM    Glucose 127 05/08/2017 03:50 AM        Assessment/Plan     Active Problems:    Cerebral hemorrhage ([address] (5/6/2017)      Dementia (5/9/2017)      Vomiting (5/9/2017)      Altered mental status (5/9/2017)       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

## 2021-01-26 NOTE — PROGRESS NOTES
[facility]  ICU Progress Note      Name: Christine Arevalo   : 1930   MRN: [id number]   Date: 2017 11:29 AM     [x]I have reviewed the flowsheet and previous days notes. Events overnight reviewed and discussed with nursing staff. Vital signs and records reviewed. 2017  No overnight events  Off cardene, requiring labetalol prn  Moving L side spont, WD RLE  Tmax 100.4      [x]The patient is unable to give any meaningful history or review of systems because the patient is:  [x]Intubated []Sedated   []Unresponsive      [x]The patient is critically ill on      [x]Mechanical ventilation []Pressors   []BiPAP []                 ROS:Review of systems not obtained due to patient factors.     Medication Review:  · Pressors -  · Sedation -  · Antibiotics -  · Pain -  · GI/ DVT -protonix  · Others (other gtts)    Safety Bundles: VAP Bundle/ CAUTI/ Severe Sepsis Protocol/ Electrolyte Replacement Protocol    Vital Signs:    Visit Vitals    /60    Pulse (!) 108    Temp 99.7 °F (37.6 °C)    Resp 25    Ht 5' 4\" (1.626 m)    Wt 42.7 kg (94 lb 2.2 oz)    SpO2 100%    BMI 16.16 kg/m2       O2 Device: Ventilator       Temp (24hrs), Av.7 °F (37.6 °C), Min:97.9 °F (36.6 °C), Max:100.4 °F (38 °C)       Intake/Output:   Last shift:      701 - 1900  In: 183.4 [I.V.:183.4]  Out: 425 [Urine:425]  Last 3 shifts: 1901 -  0700  In: 4640.1 [I.V.:4410.1]  Out: 2329 [CGEND:7444]    Intake/Output Summary (Last 24 hours) at 17 1129  Last data filed at 17 1000   Gross per 24 hour   Intake          2385.19 ml   Output             1727 ml   Net           658.19 ml       Ventilator Settings:  Ventilator  Mode: Assist control, VC+  Respiratory Rate  Back-Up Rate: 12  Insp Time (sec): 0.9 sec  I:E Ratio: 1:4.6  Ventilator Volumes  Vt Set (ml): 350 ml  Vt Exhaled (Machine Breath) (ml): 345 ml  Ve Observed (l/min): 8.17 l/min  Ventilator Pressures  PIP Observed (cm H2O): 11 cm H2O  Plateau Pressure (cm H2O): 14 cm H2O  MAP (cm H2O): 7.7  PEEP/VENT (cm H2O): 5 cm H20  Auto PEEP Observed (cm H2O): 0.5 cm H2O    Physical Exam:    General/Neuro: Intubated, off sedation, moves L UE/LE spontaneously, opens eyes to NS, WD RLE  HEENT:  Anicteric sclerae; ETT in place  Resp:  Symmetrical chest expansion, adequate airway entry; no rales/ wheezing/ rhonchi noted  CV:  Tachy, no m  GI:  Abdomen soft, non-tender; (+) active bowel sounds  Extremities:  +2 pulses on all extremities; no edema/ cyanosis/ clubbing noted  Skin:  Warm; no rashes/ lesions noted\      DATA:     Current Facility-Administered Medications   Medication Dose Route Frequency    sodium phosphate 6 mmol in 0.9% sodium chloride 250 mL infusion  6 mmol IntraVENous ONCE    dextrose 5% - 0.9% NaCl with KCl 20 mEq/L infusion  37 mL/hr IntraVENous CONTINUOUS    labetalol (NORMODYNE;TRANDATE) 20 mg/4 mL (5 mg/mL) injection 10 mg  10 mg IntraVENous Q4H PRN    insulin lispro (HUMALOG) injection   SubCUTAneous Q6H    glucose chewable tablet 16 g  4 Tab Oral PRN    glucagon (GLUCAGEN) injection 1 mg  1 mg IntraMUSCular PRN    dextrose (D50W) injection syrg 12.5-25 g  25-50 mL IntraVENous PRN    niCARdipine (CARDENE) 25 mg in 0.9% sodium chloride 250 mL infusion  0-15 mg/hr IntraVENous TITRATE    propofol (DIPRIVAN) infusion  5-50 mcg/kg/min IntraVENous TITRATE    fentaNYL citrate (PF) injection 50 mcg  50 mcg IntraVENous Q1H PRN    ELECTROLYTE REPLACEMENT PROTOCOL  1 Each Other Q8H    ELECTROLYTE REPLACEMENT PROTOCOL  1 Each Other Q8H    ELECTROLYTE REPLACEMENT PROTOCOL  1 Each Other Q8H    PHARMACY INFORMATION NOTE  1 Each Other DAILY    chlorhexidine (PERIDEX) 0.12 % mouthwash 15 mL  15 mL Oral Q12H    ondansetron (ZOFRAN) injection 1 mg  1 mg IntraVENous Q6H PRN    acetaminophen (TYLENOL) tablet 650 mg  650 mg Oral Q4H PRN    acetaminophen (TYLENOL) suppository 650 mg  650 mg Rectal Q4H PRN    senna-docusate (PERICOLACE) 8.6-50 mg per tablet 2 Tab  2 Tab Oral QHS    bisacodyl (DULCOLAX) tablet 5 mg  5 mg Oral DAILY PRN    bisacodyl (DULCOLAX) suppository 10 mg  10 mg Rectal DAILY PRN    pantoprazole (PROTONIX) 40 mg in sodium chloride 0.9 % 10 mL injection  40 mg IntraVENous Q12H    albuterol (PROVENTIL VENTOLIN) nebulizer solution 2.5 mg  2.5 mg Nebulization Y0N PRN    folic acid (FOLVITE) 1 mg, thiamine (B-1) 100 mg in 0.9% sodium chloride 50 mL ivpb   IntraVENous DAILY         Labs: Results:       Chemistry Recent Labs      05/08/17   0350  05/08/17   0100  05/07/17   1730  05/07/17 0415  05/06/17 1900   GLU  127*   --    --   220*  114*   NA  152*   --    --   146*  144   K  4.1  4.3  3.7  4.0  3.7   CL  117*   --    --   109*  104   CO2  23   --    --   23  31   BUN  11   --    --   14  16   CREA  1.15   --    --   1.19  1.22   CA  9.4   --    --   9.4  9.9   AGAP  12   --    --   14  9   BUCR  10*   --    --   12  13   AP   --    --    --    --   70   TP   --    --    --    --   8.1   ALB   --    --    --    --   4.0   GLOB   --    --    --    --   4.1*   AGRAT   --    --    --    --   1.0      CBC w/Diff Recent Labs      05/06/17 1900   WBC  9.6   RBC  4.05*   HGB  12.4   HCT  36.6   PLT  176   GRANS  62   LYMPH  31   EOS  1      Coagulation Recent Labs      05/08/17   0350  05/07/17 0415   PTP  16.4*  14.4   INR  1.4*  1.2   APTT  32.8  31.5       Liver Enzymes Recent Labs      05/06/17 1900   TP  8.1   ALB  4.0   AP  70   SGOT  18      ABG No results found for: PH, PHI, PCO2, PCO2I, PO2, PO2I, HCO3, HCO3I, FIO2, FIO2I   Microbiology No results for input(s): CULT in the last 72 hours.        Telemetry: [x]Sinus tach []A-flutter []Paced    []A-fib []Multiple PVCs                    Imaging:  []I have personally reviewed the patients radiographs  []Radiographs reviewed with radiologist   []No change from prior, tubes and lines in adequate position  []Improved   []Worsening    5/8/17 CXR   Findings: Adequately positioned endotracheal tube. Nasogastric tube extends into  the stomach. Stable cardiomediastinal silhouette. Pleural spaces appear clear. No abnormal pulmonary opacities.  No acute osseous abnormality.     IMPRESSION  IMPRESSION:      Adequately positioned support devices    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   - Neurosurgery and neurology consulted  - No plans for surgical intervention-poor prognosis per NS  - BP control  - ICH orderset  · Pulm: VDRF due to above  - No plans for SBT due to low GCS  - Vent orderset  - CXR prn  · CV: HTN  - SBP goal<140  - Currently off cardene gtt, labetalol prn  · GI  - Continue protonix  - No TF for now  · Renal/Metabolic-hypomagnesemia  - Replace lytes per protocol  ·   - d/c singh, purewick or straight cath prn for now  · ID  - monitor temps, leukocytosis noted, repeat in am        PLAN:   · Prophylaxis - DVT-SCD, GI-protonix  · Discussed in interdisciplinary rounds  · FULL CODE  · Palliative care consulted          The patient is: [] acutely ill Risk of deterioration: [] moderate    [x] critically ill  [x] high     [x]See my orders for details    My assessment/plan was discussed with:  [x]nursing []PT/OT    []respiratory therapy [x]Dr.El Hannah   []family []         YENIFER Chase yes

## 2023-08-01 NOTE — PROGRESS NOTES
2015: Assumed pt care with Xiomara Solorio RN (preceptor). Bedside verbal shift report received from Arrishi Salmon, [facility]. Pt is unresponsive in bed with labored breathing, family at bedside. 1582: Bedside and Verbal shift change report given to Brendalyn Phoenix, RN (oncoming nurse) by Jessie Munson RN (offgoing nurse) and Dante Zeng RN (preceptor). Report included the following information SBAR, Kardex, Intake/Output, MAR and Recent Results. Intermediate Repair And Graft Additional Text (Will Appearing After The Standard Complex Repair Text): The intermediate repair was not sufficient to completely close the primary defect. The remaining additional defect was repaired with the graft mentioned below.